# Patient Record
Sex: MALE | Race: WHITE | NOT HISPANIC OR LATINO | Employment: FULL TIME | URBAN - NONMETROPOLITAN AREA
[De-identification: names, ages, dates, MRNs, and addresses within clinical notes are randomized per-mention and may not be internally consistent; named-entity substitution may affect disease eponyms.]

---

## 2023-03-14 DIAGNOSIS — D50.8 OTHER IRON DEFICIENCY ANEMIA: ICD-10-CM

## 2023-03-14 RX ORDER — FENOFIBRIC ACID 135 MG/1
1 CAPSULE, DELAYED RELEASE ORAL DAILY
COMMUNITY
Start: 2023-03-10 | End: 2023-05-19 | Stop reason: SDUPTHER

## 2023-03-14 RX ORDER — FLUTICASONE PROPIONATE 50 MCG
2 SPRAY, SUSPENSION (ML) NASAL DAILY
COMMUNITY
Start: 2020-05-04

## 2023-03-14 RX ORDER — FOLIC ACID 1 MG/1
1 TABLET ORAL DAILY
COMMUNITY
End: 2024-02-07 | Stop reason: WASHOUT

## 2023-03-14 RX ORDER — METHOTREXATE SODIUM 1 G/1
1 INJECTION, POWDER, LYOPHILIZED, FOR SOLUTION INTRA-ARTERIAL; INTRAMUSCULAR; INTRATHECAL; INTRAVENOUS
COMMUNITY
End: 2023-11-20 | Stop reason: ALTCHOICE

## 2023-03-14 RX ORDER — FERROUS SULFATE TAB 325 MG (65 MG ELEMENTAL FE) 325 (65 FE) MG
1 TAB ORAL DAILY
COMMUNITY
End: 2023-03-14 | Stop reason: SDUPTHER

## 2023-03-14 RX ORDER — METOPROLOL TARTRATE 25 MG/1
25 TABLET, FILM COATED ORAL DAILY
COMMUNITY
End: 2023-04-24 | Stop reason: ALTCHOICE

## 2023-03-14 RX ORDER — ROSUVASTATIN CALCIUM 20 MG/1
1 TABLET, COATED ORAL DAILY
COMMUNITY
Start: 2022-08-09 | End: 2023-10-06 | Stop reason: SDUPTHER

## 2023-03-14 RX ORDER — ESCITALOPRAM OXALATE 20 MG/1
20 TABLET ORAL DAILY
COMMUNITY
End: 2023-05-19 | Stop reason: SDUPTHER

## 2023-03-14 RX ORDER — CYANOCOBALAMIN 1000 UG/ML
1000 INJECTION, SOLUTION INTRAMUSCULAR; SUBCUTANEOUS
COMMUNITY
End: 2023-05-19 | Stop reason: SDUPTHER

## 2023-03-14 RX ORDER — LEFLUNOMIDE 20 MG/1
20 TABLET ORAL DAILY
COMMUNITY
End: 2023-11-20 | Stop reason: SDUPTHER

## 2023-03-14 RX ORDER — FERROUS SULFATE TAB 325 MG (65 MG ELEMENTAL FE) 325 (65 FE) MG
1 TAB ORAL DAILY
Qty: 90 TABLET | Refills: 0 | Status: SHIPPED | OUTPATIENT
Start: 2023-03-14 | End: 2023-06-14

## 2023-03-14 RX ORDER — ACETAMINOPHEN 500 MG
50 TABLET ORAL DAILY
COMMUNITY
Start: 2021-03-15

## 2023-03-14 RX ORDER — HYDROXYZINE HYDROCHLORIDE 10 MG/1
1 TABLET, FILM COATED ORAL
COMMUNITY
Start: 2022-12-07 | End: 2024-01-10 | Stop reason: WASHOUT

## 2023-03-14 RX ORDER — LISINOPRIL 10 MG/1
10 TABLET ORAL DAILY
COMMUNITY
Start: 2023-03-13 | End: 2023-05-18 | Stop reason: SDUPTHER

## 2023-04-24 ENCOUNTER — TELEMEDICINE (OUTPATIENT)
Dept: PRIMARY CARE | Facility: CLINIC | Age: 53
End: 2023-04-24
Payer: COMMERCIAL

## 2023-04-24 DIAGNOSIS — F17.210 CIGARETTE NICOTINE DEPENDENCE WITHOUT COMPLICATION: ICD-10-CM

## 2023-04-24 DIAGNOSIS — I10 PRIMARY HYPERTENSION: Primary | ICD-10-CM

## 2023-04-24 PROBLEM — J30.9 ALLERGIC RHINITIS: Status: ACTIVE | Noted: 2023-04-24

## 2023-04-24 PROBLEM — F32.A DEPRESSION: Status: ACTIVE | Noted: 2023-04-24

## 2023-04-24 PROBLEM — M25.561 BILATERAL KNEE PAIN: Status: ACTIVE | Noted: 2023-04-24

## 2023-04-24 PROBLEM — E29.1 HYPOGONADISM MALE: Status: ACTIVE | Noted: 2023-04-24

## 2023-04-24 PROBLEM — M54.10 RADICULOPATHY: Status: ACTIVE | Noted: 2023-04-24

## 2023-04-24 PROBLEM — R73.01 ELEVATED FASTING BLOOD SUGAR: Status: ACTIVE | Noted: 2023-04-24

## 2023-04-24 PROBLEM — R68.82 DECREASED LIBIDO: Status: ACTIVE | Noted: 2023-04-24

## 2023-04-24 PROBLEM — M54.12 CERVICAL RADICULOPATHY, ACUTE: Status: ACTIVE | Noted: 2023-04-24

## 2023-04-24 PROBLEM — H66.90 OTITIS MEDIA: Status: ACTIVE | Noted: 2023-04-24

## 2023-04-24 PROBLEM — H90.6 MIXED HEARING LOSS, BILATERAL: Status: ACTIVE | Noted: 2023-04-24

## 2023-04-24 PROBLEM — H60.92 LEFT OTITIS EXTERNA: Status: ACTIVE | Noted: 2023-04-24

## 2023-04-24 PROBLEM — R30.0 DYSURIA: Status: ACTIVE | Noted: 2023-04-24

## 2023-04-24 PROBLEM — D51.0 PERNICIOUS ANEMIA: Status: ACTIVE | Noted: 2023-04-24

## 2023-04-24 PROBLEM — D64.9 ANEMIA: Status: ACTIVE | Noted: 2023-04-24

## 2023-04-24 PROBLEM — L02.31 ABSCESS OF RIGHT BUTTOCK: Status: ACTIVE | Noted: 2023-04-24

## 2023-04-24 PROBLEM — H73.892 RETRACTION OF TYMPANIC MEMBRANE OF LEFT EAR: Status: ACTIVE | Noted: 2023-04-24

## 2023-04-24 PROBLEM — K64.9 HEMORRHOID: Status: ACTIVE | Noted: 2023-04-24

## 2023-04-24 PROBLEM — R05.9 COUGH: Status: ACTIVE | Noted: 2023-04-24

## 2023-04-24 PROBLEM — L98.9 CHANGING SKIN LESION: Status: ACTIVE | Noted: 2023-04-24

## 2023-04-24 PROBLEM — D72.829 ELEVATED WBC COUNT: Status: ACTIVE | Noted: 2023-04-24

## 2023-04-24 PROBLEM — G96.198 ARACHNOID CYST OF SPINE: Status: ACTIVE | Noted: 2023-04-24

## 2023-04-24 PROBLEM — M06.9 RHEUMATOID ARTHRITIS (MULTI): Status: ACTIVE | Noted: 2023-04-24

## 2023-04-24 PROBLEM — R93.89 ABNORMAL MRI: Status: ACTIVE | Noted: 2023-04-24

## 2023-04-24 PROBLEM — R21 RASH: Status: ACTIVE | Noted: 2023-04-24

## 2023-04-24 PROBLEM — J01.00 ACUTE MAXILLARY SINUSITIS: Status: ACTIVE | Noted: 2023-04-24

## 2023-04-24 PROBLEM — M54.2 NECK PAIN, ACUTE: Status: ACTIVE | Noted: 2023-04-24

## 2023-04-24 PROBLEM — M25.561 RIGHT KNEE PAIN: Status: ACTIVE | Noted: 2023-04-24

## 2023-04-24 PROBLEM — M25.562 BILATERAL KNEE PAIN: Status: ACTIVE | Noted: 2023-04-24

## 2023-04-24 PROBLEM — M19.90 ARTHRITIS: Status: ACTIVE | Noted: 2023-04-24

## 2023-04-24 PROBLEM — R53.83 FATIGUE: Status: ACTIVE | Noted: 2023-04-24

## 2023-04-24 PROBLEM — J20.9 ACUTE BRONCHITIS: Status: ACTIVE | Noted: 2023-04-24

## 2023-04-24 PROBLEM — R94.128 FLAT TYMPANOGRAM OF BOTH EARS: Status: ACTIVE | Noted: 2023-04-24

## 2023-04-24 PROBLEM — E66.9 OBESITY: Status: ACTIVE | Noted: 2023-04-24

## 2023-04-24 PROBLEM — E78.5 DYSLIPIDEMIA: Status: ACTIVE | Noted: 2023-04-24

## 2023-04-24 PROBLEM — J34.89 SINUS DRAINAGE: Status: ACTIVE | Noted: 2023-04-24

## 2023-04-24 PROCEDURE — 99213 OFFICE O/P EST LOW 20 MIN: CPT | Performed by: FAMILY MEDICINE

## 2023-04-24 RX ORDER — METOPROLOL TARTRATE 50 MG/1
50 TABLET ORAL 2 TIMES DAILY
Qty: 60 TABLET | Refills: 1 | Status: SHIPPED | OUTPATIENT
Start: 2023-04-24 | End: 2023-06-14 | Stop reason: SDUPTHER

## 2023-04-29 LAB — SARS-COV-2 RESULT: NOT DETECTED

## 2023-04-30 NOTE — PROGRESS NOTES
Subjective     Domo Arcos is a 52 y.o. male who presents for Follow-up and Hypertension (Follow up meds/HTN).       This was completed via telephone due to the restrictions of the COVID-19 pandemic.  All issues as below were discussed and addressed but no physical exam was performed.  If it was felt that the patient should be evaluated in clinic then they were director there.  The patient/parent verbally consented to the visit.    Had attended video call appointment with patient's wife on [email or web address]      HPI  He had been to the neurosurgeon and blood pressure was a concern.  Today at home blood pressure was 170/100.  Denies headache denies blurred vision.  Denies chest pain.  Denies heart palpitations.  Denies shortness of breath.  Denies tingling numbness or weakness.  Educated on tobacco cessation      Review of Systems  Dictated as above  Objective   Virtual visit    Physical Exam  Virtual visit  Assessment/Plan   1.  Hypertension.  Educated on low-salt and exercise.  Started on medication.  Explained adverse effects of medication.  Advised to call 911 or to go to the emergency room as soon as possible if the blood pressure is still elevated and or tingling numbness and or weakness and or dizziness and her headache.  He understood verbalized understood and agreed.    2.  Nicotine dependence/addiction.  Educated on tobacco cessation.  Does not wish to take nicotine patch or gum or inhaler    Does not wish to take the Chantix      Problem List Items Addressed This Visit          Circulatory    Hypertension - Primary    Relevant Medications    metoprolol tartrate (Lopressor) 50 mg tablet       Other    Cigarette nicotine dependence without complication

## 2023-05-05 ENCOUNTER — PATIENT OUTREACH (OUTPATIENT)
Dept: CARE COORDINATION | Facility: CLINIC | Age: 53
End: 2023-05-05
Payer: COMMERCIAL

## 2023-05-18 DIAGNOSIS — I10 PRIMARY HYPERTENSION: ICD-10-CM

## 2023-05-18 RX ORDER — LISINOPRIL 10 MG/1
20 TABLET ORAL 2 TIMES DAILY
Qty: 360 TABLET | Refills: 0 | Status: SHIPPED | OUTPATIENT
Start: 2023-05-18 | End: 2023-10-03 | Stop reason: SDUPTHER

## 2023-05-19 DIAGNOSIS — E78.5 DYSLIPIDEMIA: Primary | ICD-10-CM

## 2023-05-19 DIAGNOSIS — D51.0 PERNICIOUS ANEMIA: ICD-10-CM

## 2023-05-19 DIAGNOSIS — F41.9 ANXIETY: ICD-10-CM

## 2023-05-19 RX ORDER — FENOFIBRIC ACID 135 MG/1
135 CAPSULE, DELAYED RELEASE ORAL DAILY
Qty: 90 CAPSULE | Refills: 0 | Status: SHIPPED | OUTPATIENT
Start: 2023-05-19 | End: 2023-12-07 | Stop reason: WASHOUT

## 2023-05-19 RX ORDER — ESCITALOPRAM OXALATE 20 MG/1
20 TABLET ORAL DAILY
Qty: 90 TABLET | Refills: 0 | Status: SHIPPED | OUTPATIENT
Start: 2023-05-19 | End: 2023-10-03 | Stop reason: SDUPTHER

## 2023-05-19 RX ORDER — CYANOCOBALAMIN 1000 UG/ML
1000 INJECTION, SOLUTION INTRAMUSCULAR; SUBCUTANEOUS
Qty: 3 ML | Refills: 0 | Status: SHIPPED | OUTPATIENT
Start: 2023-05-19 | End: 2023-10-03 | Stop reason: SDUPTHER

## 2023-06-02 LAB
GRAM STAIN: NORMAL
TISSUE/WOUND CULTURE/SMEAR: NORMAL

## 2023-06-04 LAB
GRAM STAIN: ABNORMAL
TISSUE/WOUND CULTURE/SMEAR: ABNORMAL

## 2023-06-13 DIAGNOSIS — D50.8 OTHER IRON DEFICIENCY ANEMIA: ICD-10-CM

## 2023-06-13 DIAGNOSIS — I10 PRIMARY HYPERTENSION: ICD-10-CM

## 2023-06-14 RX ORDER — FERROUS SULFATE TAB 325 MG (65 MG ELEMENTAL FE) 325 (65 FE) MG
TAB ORAL
Qty: 90 TABLET | Refills: 0 | Status: SHIPPED | OUTPATIENT
Start: 2023-06-14 | End: 2023-10-03 | Stop reason: SDUPTHER

## 2023-06-14 RX ORDER — METOPROLOL TARTRATE 50 MG/1
50 TABLET ORAL 2 TIMES DAILY
Qty: 180 TABLET | Refills: 0 | Status: SHIPPED | OUTPATIENT
Start: 2023-06-14 | End: 2023-10-03 | Stop reason: SDUPTHER

## 2023-10-02 ENCOUNTER — OFFICE VISIT (OUTPATIENT)
Dept: WOUND CARE | Facility: HOSPITAL | Age: 53
End: 2023-10-02
Payer: COMMERCIAL

## 2023-10-02 PROCEDURE — 11042 DBRDMT SUBQ TIS 1ST 20SQCM/<: CPT

## 2023-10-03 ENCOUNTER — OFFICE VISIT (OUTPATIENT)
Dept: PRIMARY CARE | Facility: CLINIC | Age: 53
End: 2023-10-03
Payer: COMMERCIAL

## 2023-10-03 ENCOUNTER — PHARMACY VISIT (OUTPATIENT)
Dept: PHARMACY | Facility: CLINIC | Age: 53
End: 2023-10-03
Payer: COMMERCIAL

## 2023-10-03 VITALS
OXYGEN SATURATION: 94 % | HEIGHT: 67 IN | HEART RATE: 74 BPM | TEMPERATURE: 97 F | SYSTOLIC BLOOD PRESSURE: 130 MMHG | BODY MASS INDEX: 37.83 KG/M2 | DIASTOLIC BLOOD PRESSURE: 88 MMHG | WEIGHT: 241 LBS

## 2023-10-03 DIAGNOSIS — D51.0 PERNICIOUS ANEMIA: ICD-10-CM

## 2023-10-03 DIAGNOSIS — F41.9 ANXIETY: ICD-10-CM

## 2023-10-03 DIAGNOSIS — Z00.00 HEALTHCARE MAINTENANCE: Primary | ICD-10-CM

## 2023-10-03 DIAGNOSIS — Z12.11 ENCOUNTER FOR SCREENING COLONOSCOPY: ICD-10-CM

## 2023-10-03 DIAGNOSIS — D50.8 OTHER IRON DEFICIENCY ANEMIA: ICD-10-CM

## 2023-10-03 DIAGNOSIS — R53.83 OTHER FATIGUE: ICD-10-CM

## 2023-10-03 DIAGNOSIS — I10 PRIMARY HYPERTENSION: ICD-10-CM

## 2023-10-03 PROCEDURE — RXMED WILLOW AMBULATORY MEDICATION CHARGE

## 2023-10-03 PROCEDURE — 99396 PREV VISIT EST AGE 40-64: CPT

## 2023-10-03 PROCEDURE — 3008F BODY MASS INDEX DOCD: CPT

## 2023-10-03 PROCEDURE — 3075F SYST BP GE 130 - 139MM HG: CPT

## 2023-10-03 PROCEDURE — 3079F DIAST BP 80-89 MM HG: CPT

## 2023-10-03 RX ORDER — METOPROLOL TARTRATE 50 MG/1
50 TABLET ORAL 2 TIMES DAILY
Qty: 180 TABLET | Refills: 0 | Status: SHIPPED | OUTPATIENT
Start: 2023-10-03 | End: 2024-01-08 | Stop reason: SDUPTHER

## 2023-10-03 RX ORDER — CYANOCOBALAMIN 1000 UG/ML
1000 INJECTION, SOLUTION INTRAMUSCULAR; SUBCUTANEOUS
Qty: 3 ML | Refills: 0 | Status: SHIPPED | OUTPATIENT
Start: 2023-10-03

## 2023-10-03 RX ORDER — FERROUS SULFATE 325(65) MG
1 TABLET ORAL DAILY
Qty: 90 TABLET | Refills: 0 | Status: SHIPPED | OUTPATIENT
Start: 2023-10-03

## 2023-10-03 RX ORDER — ESCITALOPRAM OXALATE 20 MG/1
20 TABLET ORAL DAILY
Qty: 90 TABLET | Refills: 0 | Status: SHIPPED | OUTPATIENT
Start: 2023-10-03 | End: 2024-01-03 | Stop reason: SINTOL

## 2023-10-03 RX ORDER — LISINOPRIL 10 MG/1
10 TABLET ORAL 2 TIMES DAILY
Qty: 360 TABLET | Refills: 0 | Status: SHIPPED | OUTPATIENT
Start: 2023-10-03 | End: 2024-03-28 | Stop reason: SDUPTHER

## 2023-10-03 NOTE — PATIENT INSTRUCTIONS
Come in tomorrow for blood draw, we will call with results    Thank you for coming in today, if any questions or concerns arise, please call my office.   KRISTINE Collins-CNP

## 2023-10-03 NOTE — PROGRESS NOTES
Subjective   Patient ID: Domo Arcos is a 52 y.o. male who presents for Establish Care (Domo is here to get established is needing refills on medications. ).  Domo presents to establish care today  Overall no concerns but does have underlying fatigue     Subjective  Domo Arcos is a 52 y.o. male who presents for evaluation of fatigue. Symptoms began several years ago. The patient feels the fatigue began with: exercise intolerance and a significant change in weight. Symptoms of his fatigue have been decreased libido, general malaise, and lack of interest in usual activities. Patient describes the following psychological symptoms: stress at work. Patient denies cold intolerance, GI blood loss, and significant change in weight. Symptoms have progressed to a point and plateaued. Symptom severity: symptoms bothersome, but easily able to carry out all usual work/school/family activities. Previous visits for this problem: yes, last seen a few months ago by the patient's former PCP.     Objective  [unfilled]    Assessment/Plan  Fatigue, organic etiology unlikely based on careful history and exam.  Fatigue, organic cause likely.    Discussed diagnosis with patient.  Reassured that serious underlying cause for the fatigue is very unlikely.  Discussed lifestyle modification as means of resolving problem.  See orders for lab evaluation.      Tired all the time, poor diet, high carbohydrate intake, eats out often.   Not exercising often.         Vitals:    10/03/23 1436   BP: 130/88   Pulse: 74   Temp: 36.1 °C (97 °F)   SpO2: 94%       Review of Systems   Constitutional:  Negative for activity change, fatigue, fever and unexpected weight change.   HENT: Negative.     Respiratory: Negative.  Negative for shortness of breath.    Cardiovascular: Negative.  Negative for chest pain.   Gastrointestinal: Negative.  Negative for abdominal pain.   Endocrine: Negative.    Musculoskeletal: Negative.    Skin: Negative.     Allergic/Immunologic: Negative.    Neurological:  Negative for dizziness, weakness and headaches.   Psychiatric/Behavioral: Negative.         Objective   Physical Exam  Vitals and nursing note reviewed.   Constitutional:       Appearance: Normal appearance.   HENT:      Head: Normocephalic.      Mouth/Throat:      Mouth: Mucous membranes are moist.   Cardiovascular:      Rate and Rhythm: Normal rate and regular rhythm.      Pulses: Normal pulses.      Heart sounds: Normal heart sounds. No murmur heard.     No friction rub. No gallop.   Pulmonary:      Effort: Pulmonary effort is normal. No respiratory distress.      Breath sounds: Normal breath sounds. No wheezing.   Abdominal:      General: Bowel sounds are normal. There is no distension.      Palpations: Abdomen is soft.      Tenderness: There is no abdominal tenderness.   Musculoskeletal:         General: No deformity. Normal range of motion.   Skin:     General: Skin is warm and dry.      Capillary Refill: Capillary refill takes less than 2 seconds.   Neurological:      General: No focal deficit present.      Mental Status: He is alert and oriented to person, place, and time.   Psychiatric:         Attention and Perception: Attention and perception normal.         Mood and Affect: Mood normal. Affect is flat.         Assessment/Plan   Problem List Items Addressed This Visit       Anemia    Relevant Medications    FeroSuL 325 mg (65 mg iron) tablet    Fatigue    Relevant Orders    Iron and TIBC    Testosterone, total and free    Hypertension    Relevant Medications    metoprolol tartrate (Lopressor) 50 mg tablet    lisinopril 10 mg tablet    Pernicious anemia    Relevant Medications    cyanocobalamin (Vitamin B-12) 1,000 mcg/mL injection     Other Visit Diagnoses       Encounter for screening colonoscopy    -  Primary    Relevant Orders    Referral to General Surgery    Healthcare maintenance        Relevant Orders    Hepatitis C Antibody    HIV 1/2  Antigen/Antibody Screen with Reflex to Confirmation    Vitamin D 25-Hydroxy,Total (for eval of Vitamin D levels)    CBC and Auto Differential    Comprehensive Metabolic Panel    Lipid Panel    TSH with reflex to Free T4 if abnormal    PSA, total and free    Anxiety        Relevant Medications    escitalopram (Lexapro) 20 mg tablet                 Thank you for coming in today, please call my office if you have any concerns or questions.     Socrates CARMONA, CNP

## 2023-10-04 ENCOUNTER — CLINICAL SUPPORT (OUTPATIENT)
Dept: PRIMARY CARE | Facility: CLINIC | Age: 53
End: 2023-10-04
Payer: COMMERCIAL

## 2023-10-04 DIAGNOSIS — R53.83 OTHER FATIGUE: ICD-10-CM

## 2023-10-04 DIAGNOSIS — Z00.00 HEALTHCARE MAINTENANCE: ICD-10-CM

## 2023-10-04 PROCEDURE — 87389 HIV-1 AG W/HIV-1&-2 AB AG IA: CPT

## 2023-10-04 PROCEDURE — 84402 ASSAY OF FREE TESTOSTERONE: CPT

## 2023-10-04 PROCEDURE — 86803 HEPATITIS C AB TEST: CPT

## 2023-10-04 PROCEDURE — 36415 COLL VENOUS BLD VENIPUNCTURE: CPT

## 2023-10-04 PROCEDURE — 83550 IRON BINDING TEST: CPT

## 2023-10-04 PROCEDURE — 82306 VITAMIN D 25 HYDROXY: CPT

## 2023-10-04 PROCEDURE — 83540 ASSAY OF IRON: CPT

## 2023-10-04 PROCEDURE — 80050 GENERAL HEALTH PANEL: CPT

## 2023-10-04 PROCEDURE — 80061 LIPID PANEL: CPT

## 2023-10-04 ASSESSMENT — ENCOUNTER SYMPTOMS
ACTIVITY CHANGE: 0
DIZZINESS: 0
FEVER: 0
CARDIOVASCULAR NEGATIVE: 1
WEAKNESS: 0
PSYCHIATRIC NEGATIVE: 1
HEADACHES: 0
ENDOCRINE NEGATIVE: 1
FATIGUE: 0
RESPIRATORY NEGATIVE: 1
ABDOMINAL PAIN: 0
MUSCULOSKELETAL NEGATIVE: 1
UNEXPECTED WEIGHT CHANGE: 0
GASTROINTESTINAL NEGATIVE: 1
SHORTNESS OF BREATH: 0
ALLERGIC/IMMUNOLOGIC NEGATIVE: 1

## 2023-10-05 LAB
25(OH)D3 SERPL-MCNC: 42 NG/ML (ref 30–100)
ALBUMIN SERPL BCP-MCNC: 4.3 G/DL (ref 3.4–5)
ALP SERPL-CCNC: 39 U/L (ref 33–120)
ALT SERPL W P-5'-P-CCNC: 25 U/L (ref 10–52)
ANION GAP SERPL CALC-SCNC: 17 MMOL/L (ref 10–20)
AST SERPL W P-5'-P-CCNC: 33 U/L (ref 9–39)
BASOPHILS # BLD AUTO: 0.07 X10*3/UL (ref 0–0.1)
BASOPHILS NFR BLD AUTO: 0.7 %
BILIRUB SERPL-MCNC: 0.7 MG/DL (ref 0–1.2)
BUN SERPL-MCNC: 22 MG/DL (ref 6–23)
CALCIUM SERPL-MCNC: 9.6 MG/DL (ref 8.6–10.6)
CHLORIDE SERPL-SCNC: 103 MMOL/L (ref 98–107)
CHOLEST SERPL-MCNC: 217 MG/DL (ref 0–199)
CHOLESTEROL/HDL RATIO: 4.7
CO2 SERPL-SCNC: 27 MMOL/L (ref 21–32)
CREAT SERPL-MCNC: 1.13 MG/DL (ref 0.5–1.3)
EOSINOPHIL # BLD AUTO: 0.19 X10*3/UL (ref 0–0.7)
EOSINOPHIL NFR BLD AUTO: 1.8 %
ERYTHROCYTE [DISTWIDTH] IN BLOOD BY AUTOMATED COUNT: 15.9 % (ref 11.5–14.5)
GFR SERPL CREATININE-BSD FRML MDRD: 79 ML/MIN/1.73M*2
GLUCOSE SERPL-MCNC: 74 MG/DL (ref 74–99)
HCT VFR BLD AUTO: 41.4 % (ref 41–52)
HCV AB SER QL: NONREACTIVE
HDLC SERPL-MCNC: 45.9 MG/DL
HGB BLD-MCNC: 12.9 G/DL (ref 13.5–17.5)
HIV 1+2 AB+HIV1 P24 AG SERPL QL IA: NONREACTIVE
IMM GRANULOCYTES # BLD AUTO: 0.04 X10*3/UL (ref 0–0.7)
IMM GRANULOCYTES NFR BLD AUTO: 0.4 % (ref 0–0.9)
IRON SATN MFR SERPL: 20 % (ref 25–45)
IRON SERPL-MCNC: 81 UG/DL (ref 35–150)
LDLC SERPL CALC-MCNC: 125 MG/DL (ref 140–190)
LYMPHOCYTES # BLD AUTO: 1.35 X10*3/UL (ref 1.2–4.8)
LYMPHOCYTES NFR BLD AUTO: 12.8 %
MCH RBC QN AUTO: 28.9 PG (ref 26–34)
MCHC RBC AUTO-ENTMCNC: 31.2 G/DL (ref 32–36)
MCV RBC AUTO: 93 FL (ref 80–100)
MONOCYTES # BLD AUTO: 1.29 X10*3/UL (ref 0.1–1)
MONOCYTES NFR BLD AUTO: 12.2 %
NEUTROPHILS # BLD AUTO: 7.62 X10*3/UL (ref 1.2–7.7)
NEUTROPHILS NFR BLD AUTO: 72.1 %
NON HDL CHOLESTEROL: 171 MG/DL (ref 0–149)
NRBC BLD-RTO: 0 /100 WBCS (ref 0–0)
PLATELET # BLD AUTO: 284 X10*3/UL (ref 150–450)
PMV BLD AUTO: 12.8 FL (ref 7.5–11.5)
POTASSIUM SERPL-SCNC: 5.2 MMOL/L (ref 3.5–5.3)
PROT SERPL-MCNC: 7.3 G/DL (ref 6.4–8.2)
RBC # BLD AUTO: 4.47 X10*6/UL (ref 4.5–5.9)
SODIUM SERPL-SCNC: 142 MMOL/L (ref 136–145)
TIBC SERPL-MCNC: 407 UG/DL (ref 240–445)
TRIGL SERPL-MCNC: 232 MG/DL (ref 0–149)
TSH SERPL-ACNC: 0.98 MIU/L (ref 0.44–3.98)
UIBC SERPL-MCNC: 326 UG/DL (ref 110–370)
VLDL: 46 MG/DL (ref 0–40)
WBC # BLD AUTO: 10.6 X10*3/UL (ref 4.4–11.3)

## 2023-10-06 ENCOUNTER — TELEPHONE (OUTPATIENT)
Dept: PRIMARY CARE | Facility: CLINIC | Age: 53
End: 2023-10-06
Payer: COMMERCIAL

## 2023-10-06 DIAGNOSIS — E78.5 DYSLIPIDEMIA: ICD-10-CM

## 2023-10-08 LAB
TESTOSTERONE FREE (CHAN): 48.6 PG/ML (ref 35–155)
TESTOSTERONE,TOTAL,LC-MS/MS: 408 NG/DL (ref 250–1100)

## 2023-10-09 RX ORDER — ROSUVASTATIN CALCIUM 20 MG/1
20 TABLET, COATED ORAL DAILY
Qty: 90 TABLET | Refills: 0 | Status: SHIPPED | OUTPATIENT
Start: 2023-10-09 | End: 2023-10-18 | Stop reason: SINTOL

## 2023-10-10 ENCOUNTER — TELEPHONE (OUTPATIENT)
Dept: PRIMARY CARE | Facility: CLINIC | Age: 53
End: 2023-10-10
Payer: COMMERCIAL

## 2023-10-10 NOTE — TELEPHONE ENCOUNTER
His fatigue may be related to his low iron, or potentially autoimmune related to his RA. I think rheumatology would be a good avenue to discuss, I see he last saw them a few months ago, may need medication adjustment there.

## 2023-10-16 ENCOUNTER — CLINICAL SUPPORT (OUTPATIENT)
Dept: WOUND CARE | Facility: HOSPITAL | Age: 53
End: 2023-10-16
Payer: COMMERCIAL

## 2023-10-16 PROCEDURE — 97602 WOUND(S) CARE NON-SELECTIVE: CPT

## 2023-10-18 ENCOUNTER — TELEPHONE (OUTPATIENT)
Dept: PRIMARY CARE | Facility: CLINIC | Age: 53
End: 2023-10-18
Payer: COMMERCIAL

## 2023-10-18 DIAGNOSIS — E78.5 DYSLIPIDEMIA: Primary | ICD-10-CM

## 2023-10-18 RX ORDER — ATORVASTATIN CALCIUM 40 MG/1
40 TABLET, FILM COATED ORAL DAILY
Qty: 90 TABLET | Refills: 1 | Status: SHIPPED | OUTPATIENT
Start: 2023-10-18 | End: 2023-12-07 | Stop reason: SDUPTHER

## 2023-10-18 NOTE — TELEPHONE ENCOUNTER
Nubia from [facility] called stating there is a interaction for patients new prescription of rosuvastatin 20 mg and the leflunomide 10 mg- causes levels to increase for the rosuvastatin while on leflunomide. She states there is not an interaction for rosuvastatin 10 mg or the atorvastatin 10 mg.     Please review and send over accordingly.

## 2023-10-25 ENCOUNTER — PHARMACY VISIT (OUTPATIENT)
Dept: PHARMACY | Facility: CLINIC | Age: 53
End: 2023-10-25
Payer: COMMERCIAL

## 2023-10-25 PROCEDURE — RXMED WILLOW AMBULATORY MEDICATION CHARGE

## 2023-10-26 ENCOUNTER — OFFICE VISIT (OUTPATIENT)
Dept: PRIMARY CARE | Facility: CLINIC | Age: 53
End: 2023-10-26
Payer: COMMERCIAL

## 2023-10-26 VITALS
WEIGHT: 245 LBS | SYSTOLIC BLOOD PRESSURE: 130 MMHG | BODY MASS INDEX: 38.37 KG/M2 | HEART RATE: 79 BPM | OXYGEN SATURATION: 94 % | TEMPERATURE: 97.9 F | DIASTOLIC BLOOD PRESSURE: 80 MMHG

## 2023-10-26 DIAGNOSIS — R05.1 ACUTE COUGH: Primary | ICD-10-CM

## 2023-10-26 DIAGNOSIS — J32.9 SINUSITIS, UNSPECIFIED CHRONICITY, UNSPECIFIED LOCATION: ICD-10-CM

## 2023-10-26 PROCEDURE — 99214 OFFICE O/P EST MOD 30 MIN: CPT

## 2023-10-26 PROCEDURE — 3008F BODY MASS INDEX DOCD: CPT

## 2023-10-26 PROCEDURE — 3075F SYST BP GE 130 - 139MM HG: CPT

## 2023-10-26 PROCEDURE — 3079F DIAST BP 80-89 MM HG: CPT

## 2023-10-26 PROCEDURE — 87637 SARSCOV2&INF A&B&RSV AMP PRB: CPT

## 2023-10-26 RX ORDER — AZITHROMYCIN 250 MG/1
TABLET, FILM COATED ORAL
Qty: 6 TABLET | Refills: 0 | Status: SHIPPED | OUTPATIENT
Start: 2023-10-26 | End: 2023-10-31

## 2023-10-26 NOTE — PROGRESS NOTES
Subjective   Patient ID: Domo Arcos is a 52 y.o. male who presents for Sore Throat (Domo is here for a sore throat congestion, and a fever off and on. Started Saturday, was feeling better then got worse. Did have contact with someone who had a cough last week. ).  Subjective  Domo Arcos is a 52 y.o. male who presents for evaluation of sinus pain. Symptoms include: congestion, cough, fevers, sinus pressure, and sniffing. Onset of symptoms was 6 days ago. Symptoms have been gradually worsening since that time. Past history is significant for no history of pneumonia or bronchitis. Patient is a non-smoker.    Objective  [unfilled]     Assessment/Plan  Acute bacterial sinusitis.    Azithromycin per medication orders.          Vitals:    10/26/23 1041   BP: 130/80   Pulse: 79   Temp: 36.6 °C (97.9 °F)   SpO2: 94%       Review of Systems    Objective   Physical Exam  Constitutional:       General: He is not in acute distress.     Appearance: Normal appearance.   HENT:      Head: Normocephalic.      Right Ear: Tympanic membrane is erythematous.      Left Ear: Tympanic membrane is erythematous.      Ears:      Comments: Bilateral tympanostomy tubes present     Nose: Congestion and rhinorrhea present.      Mouth/Throat:      Pharynx: Oropharyngeal exudate and posterior oropharyngeal erythema present.   Eyes:      Extraocular Movements: Extraocular movements intact.      Pupils: Pupils are equal, round, and reactive to light.   Cardiovascular:      Rate and Rhythm: Normal rate and regular rhythm.   Pulmonary:      Effort: No respiratory distress.   Abdominal:      General: Abdomen is flat. There is no distension.   Skin:     General: Skin is warm and dry.      Capillary Refill: Capillary refill takes less than 2 seconds.   Neurological:      Mental Status: He is alert.   Psychiatric:         Mood and Affect: Mood normal.         Assessment/Plan   Problem List Items Addressed This Visit       Cough - Primary    Relevant  Orders    Influenza A, and B PCR    RSV PCR    Sars-CoV-2 PCR, Symptomatic     Other Visit Diagnoses       Sinusitis, unspecified chronicity, unspecified location        Relevant Medications    azithromycin (Zithromax) 250 mg tablet                 Thank you for coming in today, please call my office if you have any concerns or questions.     Socrates CARMONA, CNP

## 2023-10-27 LAB
FLUAV RNA RESP QL NAA+PROBE: NOT DETECTED
FLUBV RNA RESP QL NAA+PROBE: NOT DETECTED
RSV RNA RESP QL NAA+PROBE: NOT DETECTED
SARS-COV-2 RNA RESP QL NAA+PROBE: NOT DETECTED

## 2023-10-30 ENCOUNTER — APPOINTMENT (OUTPATIENT)
Dept: WOUND CARE | Facility: HOSPITAL | Age: 53
End: 2023-10-30
Payer: COMMERCIAL

## 2023-10-30 ENCOUNTER — TELEPHONE (OUTPATIENT)
Dept: PRIMARY CARE | Facility: CLINIC | Age: 53
End: 2023-10-30
Payer: COMMERCIAL

## 2023-10-30 NOTE — TELEPHONE ENCOUNTER
----- Message from Socrates Castañead, KRISTINE-CNP sent at 10/27/2023  8:11 AM EDT -----  Neg viral swab

## 2023-11-06 ENCOUNTER — CLINICAL SUPPORT (OUTPATIENT)
Dept: WOUND CARE | Facility: HOSPITAL | Age: 53
End: 2023-11-06
Payer: COMMERCIAL

## 2023-11-06 PROCEDURE — 99212 OFFICE O/P EST SF 10 MIN: CPT

## 2023-11-20 ENCOUNTER — OFFICE VISIT (OUTPATIENT)
Dept: RHEUMATOLOGY | Facility: CLINIC | Age: 53
End: 2023-11-20
Payer: COMMERCIAL

## 2023-11-20 ENCOUNTER — LAB (OUTPATIENT)
Dept: LAB | Facility: LAB | Age: 53
End: 2023-11-20
Payer: COMMERCIAL

## 2023-11-20 VITALS
BODY MASS INDEX: 38.14 KG/M2 | DIASTOLIC BLOOD PRESSURE: 97 MMHG | WEIGHT: 243 LBS | HEIGHT: 67 IN | SYSTOLIC BLOOD PRESSURE: 159 MMHG

## 2023-11-20 DIAGNOSIS — Z79.899 ENCOUNTER FOR LONG-TERM (CURRENT) USE OF MEDICATIONS: ICD-10-CM

## 2023-11-20 DIAGNOSIS — M05.9 SEROPOSITIVE RHEUMATOID ARTHRITIS (MULTI): Primary | ICD-10-CM

## 2023-11-20 DIAGNOSIS — M05.9 SEROPOSITIVE RHEUMATOID ARTHRITIS (MULTI): ICD-10-CM

## 2023-11-20 LAB
CRP SERPL-MCNC: 0.65 MG/DL
ERYTHROCYTE [SEDIMENTATION RATE] IN BLOOD BY WESTERGREN METHOD: 15 MM/H (ref 0–15)

## 2023-11-20 PROCEDURE — 3008F BODY MASS INDEX DOCD: CPT | Performed by: INTERNAL MEDICINE

## 2023-11-20 PROCEDURE — 36415 COLL VENOUS BLD VENIPUNCTURE: CPT

## 2023-11-20 PROCEDURE — 86481 TB AG RESPONSE T-CELL SUSP: CPT

## 2023-11-20 PROCEDURE — 85652 RBC SED RATE AUTOMATED: CPT

## 2023-11-20 PROCEDURE — 99214 OFFICE O/P EST MOD 30 MIN: CPT | Performed by: INTERNAL MEDICINE

## 2023-11-20 PROCEDURE — 3080F DIAST BP >= 90 MM HG: CPT | Performed by: INTERNAL MEDICINE

## 2023-11-20 PROCEDURE — 86140 C-REACTIVE PROTEIN: CPT

## 2023-11-20 PROCEDURE — 3077F SYST BP >= 140 MM HG: CPT | Performed by: INTERNAL MEDICINE

## 2023-11-20 RX ORDER — LEFLUNOMIDE 20 MG/1
20 TABLET ORAL DAILY
Qty: 90 TABLET | Refills: 3 | Status: SHIPPED | OUTPATIENT
Start: 2023-11-20 | End: 2024-11-19

## 2023-11-20 ASSESSMENT — ENCOUNTER SYMPTOMS
FATIGUE: 1
SLEEP DISTURBANCE: 0
ANAL BLEEDING: 0

## 2023-11-20 NOTE — PROGRESS NOTES
Subjective   Patient ID: Domo Arcos is a 52 y.o. male who presents for Rheumatoid Arthritis (8 month follow up/Patient states that his feet are bothering him the most).  HPI  Patient with history of seropositive rheumatoid arthritis.  Previously had been on Humira and methotrexate.  Had been found to have a cyst on his spine followed by neurosurgeon Dr. Rico.  Previously had been on allopurinol for elevated uric acid.    His last visit on 2/13/2023 was a telehealth visit and I was unable to physically evaluate him.  He had felt ill on that day.  I have not physically seen him since April 2022.  He has continued to be on Xeljanz.  Since I last saw him he had a thoracic arachnoid cyst of spine removed and had T2-T5 laminectomies for resection/fenestration of intradural lesion.  The wound didn't want to heal until recently  Recently he did Have a cough and sore throat and had discussed it with his provider in October 26.  He did have recent blood work in October that showed mild anemia and normal liver and kidney functions on blood work.  He has foot issues.  Had  seen podiatry and they want to do surgery.  Has callous and bunions.    Knees sometimes  Doesn't have too much stiffness  Works at an office    Review of Systems   Constitutional:  Positive for fatigue.   HENT:          No dental issues   Eyes:         Wears glasses, vision getting worse.     Cardiovascular:  Negative for chest pain.   Gastrointestinal:  Negative for anal bleeding.   Musculoskeletal:         Per HPI   Skin:         Cysts on back   Psychiatric/Behavioral:  Negative for sleep disturbance.        Objective   Physical Exam  GEN: NAD A&O x3 appropriate affect  EYES: no conjunctival redness, eyelids normal  LYMPH: no cervical lymphadenopathy  NEURO: 5/5 strength upper and lower extremities, DTR +2 bicep and patellar tendons  SKIN:no rashes  PULSES: +radials  TENDER POINTS: 0/18   MSK:  Chronic changes in the DIP PIP MCP and wrists but no swelling  or tenderness of these joints.  Contracture of the right elbow mild decreased range of motion bilateral shoulders.  Fullness in the bilateral knees with more on the right than the left and hypertrophic changes medially.  Has chronic changes in his feet and large bunions.  Assessment/Plan        Seropositive rheumatoid arthritis. -Previously had been on Humira and methotrexate   -Currently on Xeljanz and leflunomide and has had overall improvement of his muscle skeletal symptoms.  He did have long healing time after a cyst was removed from his thoracic spine in May.  He had a discussion with podiatry about his feet which bother him the most and I feel that he may get complications with healing.  Would need to discontinue Xeljanz and leflunomide for a while which may increase his flares.    Reviewed his recent blood work from October.  Does have some mild anemia.  Has normal liver and kidney functions.  We will check his inflammatory markers and TB test.  We will have him come back in 6 months or as needed.

## 2023-11-21 RX ORDER — TOFACITINIB 11 MG/1
11 TABLET, FILM COATED, EXTENDED RELEASE ORAL DAILY
Qty: 30 TABLET | Refills: 5 | Status: SHIPPED | OUTPATIENT
Start: 2023-11-21 | End: 2024-06-18

## 2023-11-22 LAB
NIL(NEG) CONTROL SPOT COUNT: NORMAL
PANEL A SPOT COUNT: 1
PANEL B SPOT COUNT: 0
POS CONTROL SPOT COUNT: NORMAL
T-SPOT. TB INTERPRETATION: NEGATIVE

## 2023-11-27 ENCOUNTER — PHARMACY VISIT (OUTPATIENT)
Dept: PHARMACY | Facility: CLINIC | Age: 53
End: 2023-11-27
Payer: COMMERCIAL

## 2023-11-27 PROCEDURE — RXMED WILLOW AMBULATORY MEDICATION CHARGE

## 2023-12-01 ENCOUNTER — SPECIALTY PHARMACY (OUTPATIENT)
Dept: PHARMACY | Facility: CLINIC | Age: 53
End: 2023-12-01

## 2023-12-05 ENCOUNTER — TELEMEDICINE CLINICAL SUPPORT (OUTPATIENT)
Dept: PHARMACY | Facility: HOSPITAL | Age: 53
End: 2023-12-05
Payer: COMMERCIAL

## 2023-12-05 NOTE — PROGRESS NOTES
[facility] Clinical Note    Domo Arcos is a 53 y.o. male, who is on the specialty pharmacy service for management of: Rheumatology Core with status of: (Enrolled)     Domo was contacted on 12/5/2023 at 12:12 PM for a virtual pharmacy visit with encounter number [id number] from the [id number]  [address]  [facility] [address]  Dept: [phone]  Dept Fax: [phone]      Domo is taking: Xeljanz. This patient's care will be continued with the referring prescriber Dr. Marissa Adams.      General Assessment       Impression/Plan  IMPRESSION/PLAN:  Is patient high risk (potential patients:  pregnancy, geriatric, pediatric)?  No  Is laboratory follow-up needed? No   Is a clinical intervention needed? No  Next reassessment date? 11/2024  Additional comments:     Refer to the encounter summary report for documentation details about patient counseling and education.      Medication Adherence  The importance of adherence was discussed with the patient and they were advised to take the medication as prescribed by their provider. Domo was encouraged to call his physician's office if they have a question regarding a missed dose.     Conclusion  Rate your quality of life on scale of 1-10: 7  Rate your satisfaction with  Specialty Pharmacy on scale of 1-10: 9      Patient advised to contact the pharmacy if there are any changes to her medication list, including prescriptions, OTC medications, herbal products, or supplements. Patient was advised of [facility]’s dispensing process, refill timeline, contact information ([phone]), and patient management follow up. Patient confirmed understanding of education conducted during assessment. All patient questions and concerns were addressed to the best of my ability. Patient was encouraged to contact the specialty pharmacy with any  questions or concerns.    Confirmed follow-up outreaches are properly scheduled. Reviewed goals of therapy in the program targets.    Jessie Cosyb, HusseinD

## 2023-12-07 ENCOUNTER — OFFICE VISIT (OUTPATIENT)
Dept: PRIMARY CARE | Facility: CLINIC | Age: 53
End: 2023-12-07
Payer: COMMERCIAL

## 2023-12-07 ENCOUNTER — ANCILLARY PROCEDURE (OUTPATIENT)
Dept: RADIOLOGY | Facility: CLINIC | Age: 53
End: 2023-12-07
Payer: COMMERCIAL

## 2023-12-07 VITALS
HEART RATE: 84 BPM | WEIGHT: 254 LBS | SYSTOLIC BLOOD PRESSURE: 140 MMHG | DIASTOLIC BLOOD PRESSURE: 80 MMHG | BODY MASS INDEX: 39.78 KG/M2 | OXYGEN SATURATION: 94 % | TEMPERATURE: 96.6 F

## 2023-12-07 DIAGNOSIS — E78.5 DYSLIPIDEMIA: ICD-10-CM

## 2023-12-07 DIAGNOSIS — J06.9 UPPER RESPIRATORY TRACT INFECTION, UNSPECIFIED TYPE: ICD-10-CM

## 2023-12-07 DIAGNOSIS — J06.9 UPPER RESPIRATORY TRACT INFECTION, UNSPECIFIED TYPE: Primary | ICD-10-CM

## 2023-12-07 PROCEDURE — 3079F DIAST BP 80-89 MM HG: CPT

## 2023-12-07 PROCEDURE — 99214 OFFICE O/P EST MOD 30 MIN: CPT

## 2023-12-07 PROCEDURE — 71046 X-RAY EXAM CHEST 2 VIEWS: CPT

## 2023-12-07 PROCEDURE — 71046 X-RAY EXAM CHEST 2 VIEWS: CPT | Performed by: RADIOLOGY

## 2023-12-07 PROCEDURE — 3008F BODY MASS INDEX DOCD: CPT

## 2023-12-07 PROCEDURE — 3077F SYST BP >= 140 MM HG: CPT

## 2023-12-07 RX ORDER — ATORVASTATIN CALCIUM 40 MG/1
40 TABLET, FILM COATED ORAL DAILY
Qty: 90 TABLET | Refills: 1 | Status: SHIPPED | OUTPATIENT
Start: 2023-12-07 | End: 2024-07-20

## 2023-12-07 RX ORDER — DOXYCYCLINE 100 MG/1
100 TABLET ORAL 2 TIMES DAILY
Qty: 20 TABLET | Refills: 0 | Status: SHIPPED | OUTPATIENT
Start: 2023-12-07 | End: 2023-12-17

## 2023-12-07 RX ORDER — PREDNISONE 10 MG/1
10 TABLET ORAL 3 TIMES DAILY
Qty: 9 TABLET | Refills: 0 | Status: SHIPPED | OUTPATIENT
Start: 2023-12-07 | End: 2023-12-10

## 2023-12-07 RX ORDER — BENZONATATE 200 MG/1
200 CAPSULE ORAL 3 TIMES DAILY PRN
Qty: 30 CAPSULE | Refills: 0 | Status: SHIPPED | OUTPATIENT
Start: 2023-12-07 | End: 2023-12-17

## 2023-12-07 ASSESSMENT — ENCOUNTER SYMPTOMS
ACTIVITY CHANGE: 1
APPETITE CHANGE: 1
SHORTNESS OF BREATH: 0
FEVER: 1
SORE THROAT: 1
WHEEZING: 0
CHILLS: 1
FATIGUE: 1
COUGH: 1

## 2023-12-07 NOTE — PROGRESS NOTES
Subjective   Patient ID: Domo Arcos is a 53 y.o. male who presents for Cough (Domo is here for cough congestion and headaches. He has been dealing with this since his last visit. Just comes and goes. Has been having cold sweats. ).  Patient presents today with complaints of cough, chills, night sweats for the past few weeks.   He has not felt 100% since his last visit.   He has been in contact with a coworker who also is sick.             Vitals:    12/07/23 0848   BP: 140/80   Pulse: 84   Temp: 35.9 °C (96.6 °F)   SpO2: 94%       Review of Systems   Constitutional:  Positive for activity change, appetite change, chills, fatigue and fever.   HENT:  Positive for congestion and sore throat.    Respiratory:  Positive for cough. Negative for shortness of breath and wheezing.    All other systems reviewed and are negative.      Objective   Physical Exam  Constitutional:       General: He is not in acute distress.     Appearance: Normal appearance.   HENT:      Head: Normocephalic.      Right Ear: Tympanic membrane normal.      Left Ear: Tympanic membrane normal.      Nose: Congestion and rhinorrhea present.      Mouth/Throat:      Pharynx: Oropharyngeal exudate and posterior oropharyngeal erythema present.   Eyes:      Extraocular Movements: Extraocular movements intact.      Pupils: Pupils are equal, round, and reactive to light.   Cardiovascular:      Rate and Rhythm: Normal rate and regular rhythm.   Pulmonary:      Effort: No respiratory distress.      Breath sounds: Rhonchi present.   Abdominal:      General: Abdomen is flat. There is no distension.   Skin:     General: Skin is warm and dry.      Capillary Refill: Capillary refill takes less than 2 seconds.   Neurological:      Mental Status: He is alert.   Psychiatric:         Mood and Affect: Mood normal.         Assessment/Plan   Problem List Items Addressed This Visit       Dyslipidemia    Relevant Medications    atorvastatin (Lipitor) 40 mg tablet     Other  Visit Diagnoses       Upper respiratory tract infection, unspecified type    -  Primary    Relevant Medications    doxycycline (Adoxa) 100 mg tablet    predniSONE (Deltasone) 10 mg tablet    benzonatate (Tessalon) 200 mg capsule    Other Relevant Orders    XR chest 2 views                 Thank you for coming in today, please call my office if you have any concerns or questions.     Socrates CARMONA, CNP

## 2023-12-07 NOTE — PATIENT INSTRUCTIONS
Pred burst  Doxy for 10 days    Tessalon perles for 10 days    Thank you for coming in today, if any questions or concerns arise, please call my office.   KRISTINE Collins-CNP

## 2023-12-11 ENCOUNTER — TELEPHONE (OUTPATIENT)
Dept: PRIMARY CARE | Facility: CLINIC | Age: 53
End: 2023-12-11
Payer: COMMERCIAL

## 2023-12-11 NOTE — LETTER
Date: December 15, 2023  RE:  Domo Arcos  :  1970      To Whom It May Concern:    Our patient, Domo, has been under our care please excuse him from work -12/15 and now may return back to work without restrictions.    Their return to work date is:  23    If you have questions concerning this patient's immediate care, please feel free to contact our office at [phone].    Sincerely,          Socrates Castañeda, CNP

## 2023-12-11 NOTE — TELEPHONE ENCOUNTER
Unfortunately with Domo's Anita from Rheumatology, he does not qualify for the paxlovid, as these medicines cause severe reaction when taken together.

## 2023-12-11 NOTE — TELEPHONE ENCOUNTER
Tested positive for Covid last night him and wife. He is on ABX from last week you gave him.  Does he qualify for Paxlovid? Call [phone]

## 2023-12-13 PROCEDURE — RXMED WILLOW AMBULATORY MEDICATION CHARGE

## 2023-12-15 NOTE — TELEPHONE ENCOUNTER
Domo called this morning asking if you would be able to give him a letter excusing him from work this week due to Covid infection. He states his work protocol is to quarantine for 5 days and then return with an excuse letter. He would be returning Monday 12/18/23. He is going to call back with a fax number if you are able to do this.

## 2023-12-27 PROCEDURE — RXMED WILLOW AMBULATORY MEDICATION CHARGE

## 2023-12-28 ENCOUNTER — PHARMACY VISIT (OUTPATIENT)
Dept: PHARMACY | Facility: CLINIC | Age: 53
End: 2023-12-28
Payer: COMMERCIAL

## 2024-01-03 ENCOUNTER — OFFICE VISIT (OUTPATIENT)
Dept: PRIMARY CARE | Facility: CLINIC | Age: 54
End: 2024-01-03
Payer: COMMERCIAL

## 2024-01-03 VITALS
OXYGEN SATURATION: 92 % | SYSTOLIC BLOOD PRESSURE: 130 MMHG | WEIGHT: 249 LBS | BODY MASS INDEX: 39 KG/M2 | HEART RATE: 67 BPM | TEMPERATURE: 97.5 F | DIASTOLIC BLOOD PRESSURE: 90 MMHG

## 2024-01-03 DIAGNOSIS — F41.9 ANXIETY: ICD-10-CM

## 2024-01-03 DIAGNOSIS — G44.009 CLUSTER HEADACHE, NOT INTRACTABLE, UNSPECIFIED CHRONICITY PATTERN: Primary | ICD-10-CM

## 2024-01-03 PROBLEM — F51.04 PSYCHOPHYSIOLOGICAL INSOMNIA: Status: ACTIVE | Noted: 2018-04-30

## 2024-01-03 PROBLEM — J30.1 NON-SEASONAL ALLERGIC RHINITIS DUE TO POLLEN: Status: ACTIVE | Noted: 2017-02-20

## 2024-01-03 PROBLEM — E55.9 VITAMIN D DEFICIENCY: Status: ACTIVE | Noted: 2024-01-03

## 2024-01-03 PROBLEM — D49.7 NEOPLASM OF MENINGES: Status: ACTIVE | Noted: 2023-05-02

## 2024-01-03 PROBLEM — T81.49XA SURGICAL SITE INFECTION: Status: ACTIVE | Noted: 2024-01-03

## 2024-01-03 PROBLEM — D22.60 MELANOCYTIC NEVI OF UNSPECIFIED UPPER LIMB, INCLUDING SHOULDER: Status: ACTIVE | Noted: 2021-03-08

## 2024-01-03 PROBLEM — L72.3 SEBACEOUS CYST: Status: ACTIVE | Noted: 2021-03-08

## 2024-01-03 PROBLEM — Z85.828 PERSONAL HISTORY OF OTHER MALIGNANT NEOPLASM OF SKIN: Status: ACTIVE | Noted: 2021-03-08

## 2024-01-03 PROBLEM — D22.5 MELANOCYTIC NEVI OF TRUNK: Status: ACTIVE | Noted: 2021-03-08

## 2024-01-03 PROBLEM — H66.92 ACUTE OTITIS MEDIA, LEFT: Status: ACTIVE | Noted: 2018-04-30

## 2024-01-03 PROBLEM — H61.22 HEARING LOSS OF LEFT EAR DUE TO CERUMEN IMPACTION: Status: ACTIVE | Noted: 2017-02-20

## 2024-01-03 PROBLEM — H93.13 SUBJECTIVE TINNITUS OF BOTH EARS: Status: ACTIVE | Noted: 2024-01-03

## 2024-01-03 PROBLEM — L57.0 ACTINIC KERATOSIS: Status: ACTIVE | Noted: 2021-03-08

## 2024-01-03 PROBLEM — M54.50 ACUTE BILATERAL LOW BACK PAIN WITHOUT SCIATICA: Status: ACTIVE | Noted: 2017-02-13

## 2024-01-03 PROBLEM — H91.92 HEARING DIFFICULTY OF LEFT EAR: Status: ACTIVE | Noted: 2017-02-13

## 2024-01-03 PROBLEM — R42 DIZZINESS AND GIDDINESS: Status: ACTIVE | Noted: 2017-02-13

## 2024-01-03 PROBLEM — D48.5 NEOPLASM OF UNCERTAIN BEHAVIOR OF SKIN: Status: ACTIVE | Noted: 2021-03-08

## 2024-01-03 PROBLEM — R03.0 ELEVATED BP WITHOUT DIAGNOSIS OF HYPERTENSION: Status: ACTIVE | Noted: 2018-08-03

## 2024-01-03 PROCEDURE — 3075F SYST BP GE 130 - 139MM HG: CPT

## 2024-01-03 PROCEDURE — 3080F DIAST BP >= 90 MM HG: CPT

## 2024-01-03 PROCEDURE — 3008F BODY MASS INDEX DOCD: CPT

## 2024-01-03 PROCEDURE — 99214 OFFICE O/P EST MOD 30 MIN: CPT

## 2024-01-03 PROCEDURE — 96127 BRIEF EMOTIONAL/BEHAV ASSMT: CPT

## 2024-01-03 RX ORDER — LANOLIN ALCOHOL/MO/W.PET/CERES
100 CREAM (GRAM) TOPICAL DAILY
Qty: 30 TABLET | Refills: 0 | Status: SHIPPED | OUTPATIENT
Start: 2024-01-03 | End: 2024-02-07

## 2024-01-03 RX ORDER — SERTRALINE HYDROCHLORIDE 50 MG/1
50 TABLET, FILM COATED ORAL DAILY
Qty: 30 TABLET | Refills: 0 | Status: SHIPPED | OUTPATIENT
Start: 2024-01-03 | End: 2024-02-07 | Stop reason: SINTOL

## 2024-01-03 NOTE — PROGRESS NOTES
"Subjective   Patient ID: Domo Arcos is a 53 y.o. male who presents for Depression (Domo is here to discuss depression medication.  He doesn't feel like its enough. Is sleeping all day long and has no energy. ) and Headache (Domo is here due to having headaches more often lately. Has a lump in his left leg.).  Subjective  Domo Arcos is a 53 y.o. male who presents for follow up of depression. Current symptoms include depressed mood, difficulty concentrating, fatigue, hypersomnia, and weight gain. Symptoms have been gradually worsening since that time. Patient denies suicidal thoughts without plan. Previous treatment includes: medication. He complains of the following side effects from the treatment: weight gain.    Objective  /90   Pulse 67   Temp 36.4 °C (97.5 °F)   Wt 113 kg (249 lb)   SpO2 92%   BMI 39.00 kg/m²    General:  alert and oriented, in no acute distress and appears stated age  Affect & Behavior:  full facial expressions flattened affect and slowed speech    Assessment/Plan  Depression, gradually worsening.  Medications: Zoloft      Daily headaches  Not taking medicines at this time to treat the headaches  Stopped drinking alcohol recently due to his wife's health issues, states \"I'm an alcoholic\" he did not disclose how many alcoholic drinks he previously consumed    Lipoma left anterior leg  Monitor for now. Movable soft, left anterior leg superior to his knee.    Abdominal bloating with eating, continues to gain weight  Poor diet, discussed exercise, weight loss, improving diet  Consider probiotic to help with gut microbiome  He has been on antibiotic recently.        Vitals:    01/03/24 0915   BP: 130/90   Pulse: 67   Temp: 36.4 °C (97.5 °F)   SpO2: 92%       Review of Systems    Objective   Physical Exam  Vitals and nursing note reviewed.   Skin:            Comments: Lipoma approx 5cm x 5cm   Neurological:      Mental Status: He is alert.   Psychiatric:         Mood and Affect: " Mood is depressed. Affect is flat.         Assessment/Plan   Problem List Items Addressed This Visit    None  Visit Diagnoses       Cluster headache, not intractable, unspecified chronicity pattern    -  Primary    Relevant Medications    thiamine (Vitamin B-1) 100 mg tablet    Anxiety        Relevant Medications    sertraline (Zoloft) 50 mg tablet                 Thank you for coming in today, please call my office if you have any concerns or questions.     Socrates CARMONA, CNP

## 2024-01-03 NOTE — PATIENT INSTRUCTIONS
Check BP at least daily  Check when episodes of headaches occur, continue alcohol cessation, we added Thiamine supplement as well.    Start Zoloft, stop lexapro.   Take Hydroxyzine later in the day, afternoons. This may contribute to your fatigue    Monitor lipoma of leg, this is a fatty benign tumor, if changes we will have you see [facility].  See in 1  month.     Thank you for coming in today, if any questions or concerns arise, please call my office.   KRISTINE Collins-CNP

## 2024-01-08 DIAGNOSIS — I10 PRIMARY HYPERTENSION: ICD-10-CM

## 2024-01-08 RX ORDER — METOPROLOL TARTRATE 50 MG/1
50 TABLET ORAL 2 TIMES DAILY
Qty: 180 TABLET | Refills: 0 | Status: CANCELLED | OUTPATIENT
Start: 2024-01-08

## 2024-01-09 DIAGNOSIS — I10 PRIMARY HYPERTENSION: ICD-10-CM

## 2024-01-09 PROBLEM — D84.821 IMMUNODEFICIENCY DUE TO DRUGS (CODE) (MULTI): Status: ACTIVE | Noted: 2023-07-13

## 2024-01-09 PROBLEM — Z86.16 PERSONAL HISTORY OF COVID-19: Status: ACTIVE | Noted: 2023-04-24

## 2024-01-09 PROBLEM — Z79.60 LONG TERM (CURRENT) USE OF UNSPECIFIED IMMUNOMODULATORS AND IMMUNOSUPPRESSANTS: Status: ACTIVE | Noted: 2023-06-19

## 2024-01-09 PROBLEM — D50.9 IRON DEFICIENCY ANEMIA: Status: ACTIVE | Noted: 2024-01-09

## 2024-01-09 PROBLEM — F41.9 ANXIETY: Status: ACTIVE | Noted: 2023-04-24

## 2024-01-10 ENCOUNTER — PHARMACY VISIT (OUTPATIENT)
Dept: PHARMACY | Facility: CLINIC | Age: 54
End: 2024-01-10
Payer: COMMERCIAL

## 2024-01-10 ENCOUNTER — OFFICE VISIT (OUTPATIENT)
Dept: PRIMARY CARE | Facility: CLINIC | Age: 54
End: 2024-01-10
Payer: COMMERCIAL

## 2024-01-10 VITALS
SYSTOLIC BLOOD PRESSURE: 142 MMHG | WEIGHT: 241 LBS | BODY MASS INDEX: 37.75 KG/M2 | OXYGEN SATURATION: 94 % | DIASTOLIC BLOOD PRESSURE: 88 MMHG | TEMPERATURE: 98.3 F | HEART RATE: 123 BPM

## 2024-01-10 DIAGNOSIS — H65.93 OTITIS MEDIA WITH EFFUSION, BILATERAL: Primary | ICD-10-CM

## 2024-01-10 PROCEDURE — 99214 OFFICE O/P EST MOD 30 MIN: CPT

## 2024-01-10 PROCEDURE — 3079F DIAST BP 80-89 MM HG: CPT

## 2024-01-10 PROCEDURE — RXMED WILLOW AMBULATORY MEDICATION CHARGE

## 2024-01-10 PROCEDURE — 3077F SYST BP >= 140 MM HG: CPT

## 2024-01-10 PROCEDURE — 3008F BODY MASS INDEX DOCD: CPT

## 2024-01-10 RX ORDER — AMOXICILLIN AND CLAVULANATE POTASSIUM 875; 125 MG/1; MG/1
875 TABLET, FILM COATED ORAL 2 TIMES DAILY
Qty: 20 TABLET | Refills: 0 | Status: SHIPPED | OUTPATIENT
Start: 2024-01-10 | End: 2024-01-20

## 2024-01-10 RX ORDER — METOPROLOL TARTRATE 50 MG/1
50 TABLET ORAL 2 TIMES DAILY
Qty: 180 TABLET | Refills: 0 | Status: SHIPPED | OUTPATIENT
Start: 2024-01-10 | End: 2024-04-08 | Stop reason: SDUPTHER

## 2024-01-10 NOTE — PROGRESS NOTES
Subjective   Patient ID: Domo Arcos is a 53 y.o. male who presents for Follow-up (Domo is here for continued feeling sick. He is having yellow phlegm production. The headaches come and go still. ).  Subjective  Domo Arcos is a 53 y.o. male who presents with ear pain and possible ear infection. Symptoms include: bilateral ear drainage  and bilateral ear pain. Onset of symptoms was a few days ago, and have been gradually worsening since that time. Associated symptoms include: congestion and post nasal drip.  Patient denies: fever . He is drinking plenty of fluids.    Objective  /88   Pulse (!) 123   Temp 36.8 °C (98.3 °F)   Wt 109 kg (241 lb)   SpO2 94%   BMI 37.75 kg/m²   General:  alert and oriented, in no acute distress  Right Ear: red, inflamed, and with yellow discharge  Left Ear: red, inflamed, and with yellow discharge  Mouth:  lips, mucosa, and tongue normal; teeth and gums normal  Neck: no adenopathy, no carotid bruit, no JVD, supple, symmetrical, trachea midline, and thyroid not enlarged, symmetric, no tenderness/mass/nodules    Assessment/Plan  Diagnoses and associated orders for this visit:    · Otitis media with effusion, bilateral   amoxicillin-pot clavulanate (Augmentin) 875-125 mg tablet; Take 1 tablet (875 mg) by mouth 2 times a day for 10 days.            Vitals:    01/10/24 1135   BP: 142/88   Pulse: (!) 123   Temp: 36.8 °C (98.3 °F)   SpO2: 94%       Review of Systems    Objective   Physical Exam    Assessment/Plan   Problem List Items Addressed This Visit    None  Visit Diagnoses       Otitis media with effusion, bilateral    -  Primary    Relevant Medications    amoxicillin-pot clavulanate (Augmentin) 875-125 mg tablet                 Thank you for coming in today, please call my office if you have any concerns or questions.     Socrates CARMONA, CNP

## 2024-01-10 NOTE — PATIENT INSTRUCTIONS
Start augmentin twice daily with meals    Thank you for coming in today, if any questions or concerns arise, please call my office.   KRISTINE Collins-CNP

## 2024-01-22 ENCOUNTER — PHARMACY VISIT (OUTPATIENT)
Dept: PHARMACY | Facility: CLINIC | Age: 54
End: 2024-01-22
Payer: COMMERCIAL

## 2024-01-22 PROCEDURE — RXMED WILLOW AMBULATORY MEDICATION CHARGE

## 2024-01-22 RX ORDER — SYRINGE WITH NEEDLE, 1 ML 25GX5/8"
SYRINGE, EMPTY DISPOSABLE MISCELLANEOUS
Qty: 1 EACH | Refills: 0 | OUTPATIENT
Start: 2023-02-09

## 2024-01-22 RX ORDER — ATORVASTATIN CALCIUM 40 MG/1
40 TABLET, FILM COATED ORAL DAILY
Qty: 90 TABLET | Refills: 0 | OUTPATIENT
Start: 2023-10-18 | End: 2024-02-07 | Stop reason: WASHOUT

## 2024-01-22 RX ORDER — LEFLUNOMIDE 20 MG/1
20 TABLET ORAL DAILY
Qty: 90 TABLET | Refills: 0 | OUTPATIENT
Start: 2023-09-18

## 2024-01-23 ENCOUNTER — PHARMACY VISIT (OUTPATIENT)
Dept: PHARMACY | Facility: CLINIC | Age: 54
End: 2024-01-23
Payer: COMMERCIAL

## 2024-01-23 PROCEDURE — RXMED WILLOW AMBULATORY MEDICATION CHARGE

## 2024-01-23 RX ORDER — FOLIC ACID 1 MG/1
1 TABLET ORAL DAILY
Qty: 30 TABLET | Refills: 9 | OUTPATIENT
Start: 2023-08-07

## 2024-01-26 ENCOUNTER — SPECIALTY PHARMACY (OUTPATIENT)
Dept: PHARMACY | Facility: CLINIC | Age: 54
End: 2024-01-26

## 2024-02-02 PROCEDURE — RXMED WILLOW AMBULATORY MEDICATION CHARGE

## 2024-02-06 ENCOUNTER — PHARMACY VISIT (OUTPATIENT)
Dept: PHARMACY | Facility: CLINIC | Age: 54
End: 2024-02-06
Payer: COMMERCIAL

## 2024-02-07 ENCOUNTER — OFFICE VISIT (OUTPATIENT)
Dept: PRIMARY CARE | Facility: CLINIC | Age: 54
End: 2024-02-07
Payer: COMMERCIAL

## 2024-02-07 VITALS
TEMPERATURE: 96.9 F | WEIGHT: 246 LBS | OXYGEN SATURATION: 98 % | DIASTOLIC BLOOD PRESSURE: 90 MMHG | BODY MASS INDEX: 38.53 KG/M2 | SYSTOLIC BLOOD PRESSURE: 140 MMHG | HEART RATE: 90 BPM

## 2024-02-07 DIAGNOSIS — F41.9 ANXIETY: Primary | ICD-10-CM

## 2024-02-07 PROCEDURE — 99214 OFFICE O/P EST MOD 30 MIN: CPT

## 2024-02-07 PROCEDURE — 3077F SYST BP >= 140 MM HG: CPT

## 2024-02-07 PROCEDURE — 3008F BODY MASS INDEX DOCD: CPT

## 2024-02-07 PROCEDURE — 3080F DIAST BP >= 90 MM HG: CPT

## 2024-02-07 RX ORDER — ESCITALOPRAM OXALATE 20 MG/1
30 TABLET ORAL DAILY
Qty: 45 TABLET | Refills: 0 | Status: SHIPPED | OUTPATIENT
Start: 2024-02-07 | End: 2024-03-07 | Stop reason: SDUPTHER

## 2024-02-07 RX ORDER — HYDROXYZINE HYDROCHLORIDE 10 MG/1
10 TABLET, FILM COATED ORAL EVERY 6 HOURS PRN
Qty: 120 TABLET | Refills: 0 | Status: SHIPPED | OUTPATIENT
Start: 2024-02-07 | End: 2024-03-08

## 2024-02-07 NOTE — PATIENT INSTRUCTIONS
Follow in 4 weeks  Call my office and update my MA regarding progress    Call in 2 days if flare up does not improve    Thank you for coming in today, if any questions or concerns arise, please call my office.   KRISTINE Collins-CNP

## 2024-02-07 NOTE — PROGRESS NOTES
Subjective   Patient ID: Domo Arcos is a 53 y.o. male who presents for Follow-up (Domo is here for a follow up of mood. His medication he was on seemed to make thing worse so he has been out for 5 days. Had an episode last night where spouse offered to call the squad. ).  Subjective  Domo Arcos is a 53 y.o. male who presents for follow up of anxiety disorder. Current symptoms: difficulty concentrating, irritable, psychomotor agitation. He denies current suicidal and homicidal ideation. He complains of the following side effects from the treatment: none.     Objective  /90   Pulse 90   Temp 36.1 °C (96.9 °F)   Wt 112 kg (246 lb)   SpO2 98%   BMI 38.53 kg/m²    General:    alert and oriented, in no acute distress  Affect/Behavior:     good grooming and good insight, does have pressured speech    Assessment/Plan  Anxiety Disorder - worsening  Medications: Lexapro restart.          Vitals:    02/07/24 0923   BP: 140/90   Pulse: 90   Temp: 36.1 °C (96.9 °F)   SpO2: 98%       Review of Systems    Objective   Physical Exam  Vitals and nursing note reviewed.   Neurological:      Mental Status: He is alert.   Psychiatric:         Mood and Affect: Mood is anxious.         Assessment/Plan   Problem List Items Addressed This Visit       Anxiety - Primary    Relevant Medications    escitalopram (Lexapro) 20 mg tablet    hydrOXYzine HCL (Atarax) 10 mg tablet            Thank you for coming in today, please call my office if you have any concerns or questions.     Socrates CARMONA, CNP

## 2024-03-07 ENCOUNTER — APPOINTMENT (OUTPATIENT)
Dept: PRIMARY CARE | Facility: CLINIC | Age: 54
End: 2024-03-07
Payer: COMMERCIAL

## 2024-03-07 ENCOUNTER — OFFICE VISIT (OUTPATIENT)
Dept: PRIMARY CARE | Facility: CLINIC | Age: 54
End: 2024-03-07
Payer: COMMERCIAL

## 2024-03-07 VITALS
WEIGHT: 244 LBS | DIASTOLIC BLOOD PRESSURE: 80 MMHG | TEMPERATURE: 96.7 F | SYSTOLIC BLOOD PRESSURE: 130 MMHG | HEART RATE: 79 BPM | OXYGEN SATURATION: 97 % | BODY MASS INDEX: 38.22 KG/M2

## 2024-03-07 DIAGNOSIS — F41.9 ANXIETY: ICD-10-CM

## 2024-03-07 DIAGNOSIS — L73.2 HIDRADENITIS SUPPURATIVA: ICD-10-CM

## 2024-03-07 DIAGNOSIS — F32.A DEPRESSION, UNSPECIFIED DEPRESSION TYPE: Primary | ICD-10-CM

## 2024-03-07 DIAGNOSIS — Z12.11 COLON CANCER SCREENING: ICD-10-CM

## 2024-03-07 PROCEDURE — 3075F SYST BP GE 130 - 139MM HG: CPT

## 2024-03-07 PROCEDURE — 3079F DIAST BP 80-89 MM HG: CPT

## 2024-03-07 PROCEDURE — 99214 OFFICE O/P EST MOD 30 MIN: CPT

## 2024-03-07 PROCEDURE — 3008F BODY MASS INDEX DOCD: CPT

## 2024-03-07 PROCEDURE — 87070 CULTURE OTHR SPECIMN AEROBIC: CPT

## 2024-03-07 PROCEDURE — 87205 SMEAR GRAM STAIN: CPT

## 2024-03-07 PROCEDURE — 87186 SC STD MICRODIL/AGAR DIL: CPT

## 2024-03-07 PROCEDURE — 87075 CULTR BACTERIA EXCEPT BLOOD: CPT

## 2024-03-07 RX ORDER — ESCITALOPRAM OXALATE 20 MG/1
30 TABLET ORAL DAILY
Qty: 135 TABLET | Refills: 1 | Status: SHIPPED | OUTPATIENT
Start: 2024-03-07 | End: 2024-09-03

## 2024-03-07 NOTE — PROGRESS NOTES
Subjective   Patient ID: Domo Arcos is a 53 y.o. male who presents for Follow-up (Domo is here for his follow up. Has been having some skin issues everywhere, has been getting boils. Has 2 around the waist line. ).  Lexapro follow up  He is on 30mg daily  --mood is exponentially improved. Return in 6 months follow up.    Boils, long standing issue, years.  --Worse lately over the past couple of weeks.  --Dial soap since spine surgery.   --see DERM  --wound culture done          Vitals:    03/07/24 1521   BP: 130/80   Pulse: 79   Temp: 35.9 °C (96.7 °F)   SpO2: 97%       Review of Systems    Objective   Physical Exam  Skin:               Assessment/Plan   Problem List Items Addressed This Visit       Depression - Primary    Anxiety    Relevant Medications    escitalopram (Lexapro) 20 mg tablet     Other Visit Diagnoses       Colon cancer screening        Relevant Orders    Colonoscopy Screening; Average Risk Patient    Hidradenitis suppurativa        Relevant Orders    Referral to Dermatology                 Thank you for coming in today, please call my office if you have any concerns or questions.     Socrates CARMONA, CNP

## 2024-03-07 NOTE — PATIENT INSTRUCTIONS
Culture pending on wound  See in 6 months follow up mood, anytime sooner    Thank you for coming in today, if any questions or concerns arise, please call my office.   Socrates Castañeda, KRISTINE-CNP

## 2024-03-08 DIAGNOSIS — Z12.11 SCREEN FOR COLON CANCER: ICD-10-CM

## 2024-03-08 RX ORDER — SODIUM, POTASSIUM,MAG SULFATES 17.5-3.13G
SOLUTION, RECONSTITUTED, ORAL ORAL
Qty: 354 ML | Refills: 0 | Status: SHIPPED | OUTPATIENT
Start: 2024-03-08

## 2024-03-10 LAB
BACTERIA SPEC CULT: ABNORMAL
GRAM STN SPEC: ABNORMAL
GRAM STN SPEC: ABNORMAL

## 2024-03-11 ENCOUNTER — TELEPHONE (OUTPATIENT)
Dept: PRIMARY CARE | Facility: CLINIC | Age: 54
End: 2024-03-11
Payer: COMMERCIAL

## 2024-03-11 DIAGNOSIS — L73.2 HIDRADENITIS SUPPURATIVA: Primary | ICD-10-CM

## 2024-03-11 PROCEDURE — RXMED WILLOW AMBULATORY MEDICATION CHARGE

## 2024-03-11 RX ORDER — DOXYCYCLINE 100 MG/1
100 TABLET ORAL 2 TIMES DAILY
Qty: 20 TABLET | Refills: 0 | Status: SHIPPED | OUTPATIENT
Start: 2024-03-11 | End: 2024-03-21

## 2024-03-11 NOTE — TELEPHONE ENCOUNTER
----- Message from KRISTINE Collins-CNP sent at 3/11/2024  8:06 AM EDT -----  Results were abnormal... culture is abnormal, I sent over 10 days doxycycline

## 2024-03-15 ENCOUNTER — PHARMACY VISIT (OUTPATIENT)
Dept: PHARMACY | Facility: CLINIC | Age: 54
End: 2024-03-15
Payer: COMMERCIAL

## 2024-03-19 PROCEDURE — RXMED WILLOW AMBULATORY MEDICATION CHARGE

## 2024-03-20 ENCOUNTER — PHARMACY VISIT (OUTPATIENT)
Dept: PHARMACY | Facility: CLINIC | Age: 54
End: 2024-03-20
Payer: COMMERCIAL

## 2024-03-28 DIAGNOSIS — I10 PRIMARY HYPERTENSION: ICD-10-CM

## 2024-03-29 RX ORDER — LISINOPRIL 10 MG/1
10 TABLET ORAL 2 TIMES DAILY
Qty: 360 TABLET | Refills: 0 | Status: SHIPPED | OUTPATIENT
Start: 2024-03-29

## 2024-04-08 DIAGNOSIS — I10 PRIMARY HYPERTENSION: ICD-10-CM

## 2024-04-08 PROCEDURE — RXMED WILLOW AMBULATORY MEDICATION CHARGE

## 2024-04-08 RX ORDER — METOPROLOL TARTRATE 50 MG/1
50 TABLET ORAL 2 TIMES DAILY
Qty: 180 TABLET | Refills: 1 | Status: SHIPPED | OUTPATIENT
Start: 2024-04-08

## 2024-04-09 ENCOUNTER — PHARMACY VISIT (OUTPATIENT)
Dept: PHARMACY | Facility: CLINIC | Age: 54
End: 2024-04-09
Payer: COMMERCIAL

## 2024-04-12 ENCOUNTER — PHARMACY VISIT (OUTPATIENT)
Dept: PHARMACY | Facility: CLINIC | Age: 54
End: 2024-04-12
Payer: COMMERCIAL

## 2024-04-12 PROCEDURE — RXMED WILLOW AMBULATORY MEDICATION CHARGE

## 2024-04-30 PROCEDURE — RXMED WILLOW AMBULATORY MEDICATION CHARGE

## 2024-05-02 ENCOUNTER — PHARMACY VISIT (OUTPATIENT)
Dept: PHARMACY | Facility: CLINIC | Age: 54
End: 2024-05-02
Payer: COMMERCIAL

## 2024-05-13 PROCEDURE — RXMED WILLOW AMBULATORY MEDICATION CHARGE

## 2024-05-14 ENCOUNTER — PHARMACY VISIT (OUTPATIENT)
Dept: PHARMACY | Facility: CLINIC | Age: 54
End: 2024-05-14
Payer: COMMERCIAL

## 2024-05-20 ENCOUNTER — OFFICE VISIT (OUTPATIENT)
Dept: RHEUMATOLOGY | Facility: CLINIC | Age: 54
End: 2024-05-20
Payer: COMMERCIAL

## 2024-05-20 ENCOUNTER — LAB (OUTPATIENT)
Dept: LAB | Facility: LAB | Age: 54
End: 2024-05-20
Payer: COMMERCIAL

## 2024-05-20 VITALS
DIASTOLIC BLOOD PRESSURE: 100 MMHG | BODY MASS INDEX: 37.35 KG/M2 | WEIGHT: 238 LBS | SYSTOLIC BLOOD PRESSURE: 148 MMHG | HEIGHT: 67 IN | HEART RATE: 86 BPM

## 2024-05-20 DIAGNOSIS — Z79.899 ENCOUNTER FOR LONG-TERM (CURRENT) USE OF MEDICATIONS: ICD-10-CM

## 2024-05-20 DIAGNOSIS — M05.9 SEROPOSITIVE RHEUMATOID ARTHRITIS (MULTI): ICD-10-CM

## 2024-05-20 DIAGNOSIS — M05.9 SEROPOSITIVE RHEUMATOID ARTHRITIS (MULTI): Primary | ICD-10-CM

## 2024-05-20 DIAGNOSIS — M54.2 NECK PAIN: ICD-10-CM

## 2024-05-20 LAB
ALBUMIN SERPL BCP-MCNC: 4.1 G/DL (ref 3.4–5)
ALP SERPL-CCNC: 77 U/L (ref 33–120)
ALT SERPL W P-5'-P-CCNC: 22 U/L (ref 10–52)
ANION GAP SERPL CALC-SCNC: 15 MMOL/L (ref 10–20)
AST SERPL W P-5'-P-CCNC: 24 U/L (ref 9–39)
BILIRUB SERPL-MCNC: 0.6 MG/DL (ref 0–1.2)
BUN SERPL-MCNC: 16 MG/DL (ref 6–23)
CALCIUM SERPL-MCNC: 9.2 MG/DL (ref 8.6–10.3)
CHLORIDE SERPL-SCNC: 106 MMOL/L (ref 98–107)
CO2 SERPL-SCNC: 25 MMOL/L (ref 21–32)
CREAT SERPL-MCNC: 0.86 MG/DL (ref 0.5–1.3)
CRP SERPL-MCNC: 1.46 MG/DL
EGFRCR SERPLBLD CKD-EPI 2021: >90 ML/MIN/1.73M*2
ERYTHROCYTE [DISTWIDTH] IN BLOOD BY AUTOMATED COUNT: 14.2 % (ref 11.5–14.5)
ERYTHROCYTE [SEDIMENTATION RATE] IN BLOOD BY WESTERGREN METHOD: 67 MM/H (ref 0–20)
GLUCOSE SERPL-MCNC: 138 MG/DL (ref 74–99)
HCT VFR BLD AUTO: 41.5 % (ref 41–52)
HGB BLD-MCNC: 13.4 G/DL (ref 13.5–17.5)
MCH RBC QN AUTO: 28.5 PG (ref 26–34)
MCHC RBC AUTO-ENTMCNC: 32.3 G/DL (ref 32–36)
MCV RBC AUTO: 88 FL (ref 80–100)
NRBC BLD-RTO: 0 /100 WBCS (ref 0–0)
PLATELET # BLD AUTO: 276 X10*3/UL (ref 150–450)
POTASSIUM SERPL-SCNC: 3.9 MMOL/L (ref 3.5–5.3)
PROT SERPL-MCNC: 6.8 G/DL (ref 6.4–8.2)
RBC # BLD AUTO: 4.7 X10*6/UL (ref 4.5–5.9)
SODIUM SERPL-SCNC: 142 MMOL/L (ref 136–145)
WBC # BLD AUTO: 10.6 X10*3/UL (ref 4.4–11.3)

## 2024-05-20 PROCEDURE — 80053 COMPREHEN METABOLIC PANEL: CPT

## 2024-05-20 PROCEDURE — 85652 RBC SED RATE AUTOMATED: CPT

## 2024-05-20 PROCEDURE — 99213 OFFICE O/P EST LOW 20 MIN: CPT | Performed by: INTERNAL MEDICINE

## 2024-05-20 PROCEDURE — 86140 C-REACTIVE PROTEIN: CPT

## 2024-05-20 PROCEDURE — 3077F SYST BP >= 140 MM HG: CPT | Performed by: INTERNAL MEDICINE

## 2024-05-20 PROCEDURE — 3080F DIAST BP >= 90 MM HG: CPT | Performed by: INTERNAL MEDICINE

## 2024-05-20 PROCEDURE — 85027 COMPLETE CBC AUTOMATED: CPT

## 2024-05-20 PROCEDURE — 36415 COLL VENOUS BLD VENIPUNCTURE: CPT

## 2024-05-20 ASSESSMENT — ENCOUNTER SYMPTOMS
ANAL BLEEDING: 0
FATIGUE: 1
SLEEP DISTURBANCE: 0

## 2024-05-20 ASSESSMENT — PAIN SCALES - GENERAL: PAINLEVEL: 6

## 2024-05-20 NOTE — PROGRESS NOTES
Subjective   Patient ID: Domo Arcos is a 53 y.o. male who presents for Rheumatoid Arthritis (6 month follow up /Right knee and neck pain for about a week ).  HPI  Patient with history of seropositive rheumatoid arthritis.  Previously had been on Humira and methotrexate.  Had been found to have a cyst on his spine followed by neurosurgeon Dr. Rico.  Previously had been on allopurinol for elevated uric acid.    At his last visit we had him continue on Xeljanz and leflunomide.  He has had some pains in the last week and his right side of the neck and also in his right knee.  He denies any infections.    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  His wife has had some health issues.    He usually sees his provider once a year.  He feels that he had some muscular chest pain  Review of Systems   Constitutional:  Positive for fatigue.   HENT:          No dental issues   Eyes:         Wears glasses, vision getting worse.     Respiratory:          +tob use   Cardiovascular:  Negative for chest pain.   Gastrointestinal:  Negative for anal bleeding.   Musculoskeletal:         Per HPI   Skin:         Cysts on back   Psychiatric/Behavioral:  Negative for sleep disturbance.        Objective   Physical Exam  GEN: NAD A&O x3 appropriate affect  EYES: no conjunctival redness, eyelids normal  LYMPH: no cervical lymphadenopathy  NEURO: 5/5 strength upper and lower extremities, DTR +2 bicep and patellar tendons  SKIN:no rashes  PULSES: +radials  TENDER POINTS: 0/18   MSK:  Chronic changes in the DIP PIP MCP and wrists but no swelling or tenderness of these joints.  Contracture of the right elbow mild decreased range of motion bilateral shoulders.  Has swelling more on the right knee than the left and hypertrophic changes medially.  Also has some mild warmth on the right more than the left  Tenderness at the base of the right neck and trapezius  Assessment/Plan        Seropositive rheumatoid arthritis. -Previously had been  on Humira and methotrexate   -Currently on Xeljanz and leflunomide which I do feel to be helpful for his musculoskeletal symptoms.  I will have him continue with this.   -He is a positive smoker and had last lipid panel that showed some mild hyperlipidemia.  Discussed about getting CT cardiac scoring and discussed it with his PCP    Neck pain discussed using heat and massage.  He defers injection into the right knee.  Does not think that a muscle relaxer would really help.    We will have him come back in 6 months or as needed.

## 2024-05-22 ENCOUNTER — TELEPHONE (OUTPATIENT)
Dept: PRIMARY CARE | Facility: CLINIC | Age: 54
End: 2024-05-22
Payer: COMMERCIAL

## 2024-06-11 PROCEDURE — RXMED WILLOW AMBULATORY MEDICATION CHARGE

## 2024-06-12 DIAGNOSIS — E78.5 DYSLIPIDEMIA: ICD-10-CM

## 2024-06-12 DIAGNOSIS — D50.8 OTHER IRON DEFICIENCY ANEMIA: ICD-10-CM

## 2024-06-13 ENCOUNTER — PHARMACY VISIT (OUTPATIENT)
Dept: PHARMACY | Facility: CLINIC | Age: 54
End: 2024-06-13
Payer: COMMERCIAL

## 2024-06-13 PROCEDURE — RXMED WILLOW AMBULATORY MEDICATION CHARGE

## 2024-06-13 RX ORDER — ATORVASTATIN CALCIUM 40 MG/1
40 TABLET, FILM COATED ORAL DAILY
Qty: 90 TABLET | Refills: 0 | Status: SHIPPED | OUTPATIENT
Start: 2024-06-13 | End: 2024-12-10

## 2024-06-13 RX ORDER — FERROUS SULFATE 325(65) MG
1 TABLET ORAL DAILY
Qty: 90 TABLET | Refills: 0 | Status: SHIPPED | OUTPATIENT
Start: 2024-06-13

## 2024-06-14 DIAGNOSIS — I10 PRIMARY HYPERTENSION: ICD-10-CM

## 2024-06-14 RX ORDER — LISINOPRIL 10 MG/1
10 TABLET ORAL 2 TIMES DAILY
Qty: 360 TABLET | Refills: 0 | Status: SHIPPED | OUTPATIENT
Start: 2024-06-14

## 2024-06-17 ENCOUNTER — ANESTHESIA EVENT (OUTPATIENT)
Dept: ANESTHESIOLOGY | Facility: HOSPITAL | Age: 54
End: 2024-06-17

## 2024-06-17 ENCOUNTER — PRE-ADMISSION TESTING (OUTPATIENT)
Dept: PREADMISSION TESTING | Facility: HOSPITAL | Age: 54
End: 2024-06-17
Payer: COMMERCIAL

## 2024-06-17 VITALS — WEIGHT: 238 LBS | HEIGHT: 67 IN | BODY MASS INDEX: 37.35 KG/M2

## 2024-06-17 SDOH — HEALTH STABILITY: MENTAL HEALTH: CURRENT SMOKER: 1

## 2024-06-17 ASSESSMENT — DUKE ACTIVITY SCORE INDEX (DASI)
CAN YOU DO LIGHT WORK AROUND THE HOUSE LIKE DUSTING OR WASHING DISHES: YES
CAN YOU DO YARD WORK LIKE RAKING LEAVES, WEEDING OR PUSHING A MOWER: YES
CAN YOU WALK INDOORS, SUCH AS AROUND YOUR HOUSE: YES
CAN YOU CLIMB A FLIGHT OF STAIRS OR WALK UP A HILL: YES
CAN YOU TAKE CARE OF YOURSELF (EAT, DRESS, BATHE, OR USE TOILET): YES
CAN YOU HAVE SEXUAL RELATIONS: YES
CAN YOU RUN A SHORT DISTANCE: YES
CAN YOU DO MODERATE WORK AROUND THE HOUSE LIKE VACUUMING, SWEEPING FLOORS OR CARRYING GROCERIES: YES
CAN YOU PARTICIPATE IN MODERATE RECREATIONAL ACTIVITIES LIKE GOLF, BOWLING, DANCING, DOUBLES TENNIS OR THROWING A BASEBALL OR FOOTBALL: YES
CAN YOU WALK A BLOCK OR TWO ON LEVEL GROUND: YES
CAN YOU PARTICIPATE IN STRENOUS SPORTS LIKE SWIMMING, SINGLES TENNIS, FOOTBALL, BASKETBALL, OR SKIING: YES
DASI METS SCORE: 9.9
CAN YOU DO HEAVY WORK AROUND THE HOUSE LIKE SCRUBBING FLOORS OR LIFTING AND MOVING HEAVY FURNITURE: YES
TOTAL_SCORE: 58.2

## 2024-06-17 ASSESSMENT — PAIN - FUNCTIONAL ASSESSMENT: PAIN_FUNCTIONAL_ASSESSMENT: 0-10

## 2024-06-17 NOTE — ANESTHESIA PREPROCEDURE EVALUATION
Patient: Domo Arcos    Procedure Information    Date: 06/17/24  Reason: pat         Relevant Problems   Cardiac   (+) Atypical chest pain   (+) Hypertension      Neuro   (+) Anxiety   (+) Cervical radiculopathy, acute   (+) Depression   (+) Radiculopathy      Endocrine   (+) Obesity      Hematology   (+) Anemia   (+) Iron deficiency anemia   (+) Pernicious anemia      Musculoskeletal   (+) Rheumatoid arthritis (Multi)      HEENT   (+) Acute maxillary sinusitis   (+) Hearing loss of left ear due to cerumen impaction   (+) Mixed hearing loss, bilateral   (+) Sinus drainage      ID   (+) Surgical site infection      Skin   (+) Rash       Clinical information reviewed:   Tobacco  Allergies  Meds   Med Hx  Surg Hx   Fam Hx  Soc Hx        NPO Detail:  No data recorded     PHYSICAL EXAM    Anesthesia Plan    History of general anesthesia?: yes  History of complications of general anesthesia?: unknown/emergency    ASA 3     MAC     The patient is a current smoker.

## 2024-06-17 NOTE — PREPROCEDURE INSTRUCTIONS
"   Medication List            Accurate as of June 17, 2024  9:36 AM. Always use your most recent med list.                atorvastatin 40 mg tablet  Commonly known as: Lipitor  Take 1 tablet (40 mg) by mouth once daily.  Medication Adjustments for Surgery: Continue until night before surgery     BD Luer-Val Syringe 3 mL 23 x 1\" syringe  Generic drug: syringe with needle  use as directed . once a month  Medication Adjustments for Surgery: Continue until night before surgery     cholecalciferol 50 mcg (2,000 unit) capsule  Commonly known as: Vitamin D-3  Medication Adjustments for Surgery: Stop 7 days before surgery     cyanocobalamin 1,000 mcg/mL injection  Commonly known as: Vitamin B-12  Inject 1 mL (1,000 mcg) into the shoulder, thigh, or buttocks every 30 (thirty) days.  Medication Adjustments for Surgery: Stop 7 days before surgery     escitalopram 20 mg tablet  Commonly known as: Lexapro  Take 1.5 tablets (30 mg) by mouth once daily.  Medication Adjustments for Surgery: Continue until night before surgery     FeroSuL tablet  Generic drug: ferrous sulfate (325 mg ferrous sulfate)  Take 1 tablet (325 mg) by mouth once daily as directed  Medication Adjustments for Surgery: Stop 7 days before surgery     folic acid 1 mg tablet  Commonly known as: Folvite  Take 1 tablet (1 mg) by mouth once daily.  Medication Adjustments for Surgery: Stop 7 days before surgery     hydrOXYzine HCL 10 mg tablet  Commonly known as: Atarax  Take 1 tablet (10 mg) by mouth every 6 hours if needed for anxiety.  Medication Adjustments for Surgery: Continue until night before surgery     * leflunomide 20 mg tablet  Commonly known as: Arava  Take 1 tablet (20 mg) by mouth once daily.  Medication Adjustments for Surgery: Other (Comment)  Notes to patient: Reach out to Dr. Adams office to see if medication needs held prior to procedure.     * leflunomide 20 mg tablet  Commonly known as: Arava  Take 1 tablet (20 mg) by mouth once " daily.  Medication Adjustments for Surgery: Other (Comment)     lisinopril 10 mg tablet  Take 1 tablet (10 mg) by mouth 2 times a day.  Medication Adjustments for Surgery: Take morning of surgery with sip of water, no other fluids     metoprolol tartrate 50 mg tablet  Commonly known as: Lopressor  Take 1 tablet by mouth 2 times a day.  Medication Adjustments for Surgery: Take morning of surgery with sip of water, no other fluids     sodium,potassium,mag sulfates 17.5-3.13-1.6 gram recon soln solution  Commonly known as: Suprep Bowel Prep Kit  Take one bottle twice as directed by prep directions  Medication Adjustments for Surgery: Other (Comment)     Xeljanz XR 11 mg tablet extended release 24 hr  Generic drug: tofacitinib ER  Take 1 tablet (11 mg) by mouth once daily.  Medication Adjustments for Surgery: Other (Comment)  Notes to patient: Reach out to Dr. Adams office to see if medication needs held prior to procedure.           * This list has 2 medication(s) that are the same as other medications prescribed for you. Read the directions carefully, and ask your doctor or other care provider to review them with you.                              Call outpatient surgery Friday, July 5th  before between 1-3 pm to get your arrival time.   [phone]. Please do not go by time listed on [facility], you must still call outpatient surgery to find out your time.    No solid foods the day before your procedure unless otherwise specified.    You can have clear liquids up to 3 hrs prior to your procedure.  NO DAIRY, NO CREAMER, NO NON DAIRY OR ALMOND, OAT, COCONUT ETC.    Please refrain from smoking THC, cigarettes or vaping prior to your procedure at least 24 hrs.     When  you arrive park in the back ER parking lot.  Come through the ER lobby and take the first elevator to second floor.   Check in on the outpatient surgery window.    Leave all valuables at home and remove all piercing's.      Do mouth wash and bath for  infection control if applicable.      NO driving for 24 hrs if you have had anesthesia or sedation.   You will also need a  if you are receiving sedation.       Bring glasses so you can read what you are signing.

## 2024-07-02 ENCOUNTER — PHARMACY VISIT (OUTPATIENT)
Dept: PHARMACY | Facility: CLINIC | Age: 54
End: 2024-07-02
Payer: COMMERCIAL

## 2024-07-02 PROCEDURE — RXMED WILLOW AMBULATORY MEDICATION CHARGE

## 2024-07-08 ENCOUNTER — ANESTHESIA (OUTPATIENT)
Dept: GASTROENTEROLOGY | Facility: HOSPITAL | Age: 54
End: 2024-07-08
Payer: COMMERCIAL

## 2024-07-08 ENCOUNTER — HOSPITAL ENCOUNTER (OUTPATIENT)
Dept: GASTROENTEROLOGY | Facility: HOSPITAL | Age: 54
Setting detail: OUTPATIENT SURGERY
Discharge: HOME | End: 2024-07-08
Payer: COMMERCIAL

## 2024-07-08 ENCOUNTER — ANESTHESIA EVENT (OUTPATIENT)
Dept: GASTROENTEROLOGY | Facility: HOSPITAL | Age: 54
End: 2024-07-08
Payer: COMMERCIAL

## 2024-07-08 VITALS
RESPIRATION RATE: 17 BRPM | SYSTOLIC BLOOD PRESSURE: 98 MMHG | WEIGHT: 238 LBS | HEIGHT: 67 IN | BODY MASS INDEX: 37.35 KG/M2 | TEMPERATURE: 97.2 F | HEART RATE: 77 BPM | DIASTOLIC BLOOD PRESSURE: 53 MMHG | OXYGEN SATURATION: 96 %

## 2024-07-08 DIAGNOSIS — M05.9 SEROPOSITIVE RHEUMATOID ARTHRITIS (MULTI): ICD-10-CM

## 2024-07-08 DIAGNOSIS — Z12.11 COLON CANCER SCREENING: ICD-10-CM

## 2024-07-08 PROCEDURE — 7100000010 HC PHASE TWO TIME - EACH INCREMENTAL 1 MINUTE

## 2024-07-08 PROCEDURE — 2500000005 HC RX 250 GENERAL PHARMACY W/O HCPCS: Performed by: NURSE ANESTHETIST, CERTIFIED REGISTERED

## 2024-07-08 PROCEDURE — 7100000009 HC PHASE TWO TIME - INITIAL BASE CHARGE

## 2024-07-08 PROCEDURE — 2500000004 HC RX 250 GENERAL PHARMACY W/ HCPCS (ALT 636 FOR OP/ED): Performed by: PHYSICIAN ASSISTANT

## 2024-07-08 PROCEDURE — 3700000001 HC GENERAL ANESTHESIA TIME - INITIAL BASE CHARGE

## 2024-07-08 PROCEDURE — 3700000002 HC GENERAL ANESTHESIA TIME - EACH INCREMENTAL 1 MINUTE

## 2024-07-08 PROCEDURE — 2500000004 HC RX 250 GENERAL PHARMACY W/ HCPCS (ALT 636 FOR OP/ED): Performed by: NURSE ANESTHETIST, CERTIFIED REGISTERED

## 2024-07-08 PROCEDURE — 45380 COLONOSCOPY AND BIOPSY: CPT | Performed by: SURGERY

## 2024-07-08 RX ORDER — SODIUM CHLORIDE, SODIUM LACTATE, POTASSIUM CHLORIDE, CALCIUM CHLORIDE 600; 310; 30; 20 MG/100ML; MG/100ML; MG/100ML; MG/100ML
100 INJECTION, SOLUTION INTRAVENOUS CONTINUOUS
Status: DISCONTINUED | OUTPATIENT
Start: 2024-07-08 | End: 2024-07-09 | Stop reason: HOSPADM

## 2024-07-08 RX ORDER — PROPOFOL 10 MG/ML
INJECTION, EMULSION INTRAVENOUS AS NEEDED
Status: DISCONTINUED | OUTPATIENT
Start: 2024-07-08 | End: 2024-07-08

## 2024-07-08 RX ORDER — ONDANSETRON HYDROCHLORIDE 2 MG/ML
4 INJECTION, SOLUTION INTRAVENOUS ONCE AS NEEDED
Status: DISCONTINUED | OUTPATIENT
Start: 2024-07-08 | End: 2024-07-09 | Stop reason: HOSPADM

## 2024-07-08 RX ORDER — FENTANYL CITRATE 50 UG/ML
INJECTION, SOLUTION INTRAMUSCULAR; INTRAVENOUS AS NEEDED
Status: DISCONTINUED | OUTPATIENT
Start: 2024-07-08 | End: 2024-07-08

## 2024-07-08 RX ORDER — LIDOCAINE HYDROCHLORIDE 20 MG/ML
INJECTION, SOLUTION INFILTRATION; PERINEURAL AS NEEDED
Status: DISCONTINUED | OUTPATIENT
Start: 2024-07-08 | End: 2024-07-08

## 2024-07-08 SDOH — HEALTH STABILITY: MENTAL HEALTH: CURRENT SMOKER: 1

## 2024-07-08 ASSESSMENT — PAIN SCALES - GENERAL
PAINLEVEL_OUTOF10: 3
PAINLEVEL_OUTOF10: 0 - NO PAIN
PAIN_LEVEL: 0

## 2024-07-08 ASSESSMENT — COLUMBIA-SUICIDE SEVERITY RATING SCALE - C-SSRS
1. IN THE PAST MONTH, HAVE YOU WISHED YOU WERE DEAD OR WISHED YOU COULD GO TO SLEEP AND NOT WAKE UP?: NO
2. HAVE YOU ACTUALLY HAD ANY THOUGHTS OF KILLING YOURSELF?: NO
6. HAVE YOU EVER DONE ANYTHING, STARTED TO DO ANYTHING, OR PREPARED TO DO ANYTHING TO END YOUR LIFE?: NO

## 2024-07-08 ASSESSMENT — PAIN - FUNCTIONAL ASSESSMENT
PAIN_FUNCTIONAL_ASSESSMENT: 0-10
PAIN_FUNCTIONAL_ASSESSMENT: 0-10

## 2024-07-08 NOTE — ANESTHESIA PREPROCEDURE EVALUATION
Patient: Domo Arcos    Procedure Information       Date/Time: 07/08/24 1000    Scheduled providers: Israel Vera MD    Procedure: COLONOSCOPY    Location: [facility]          Vitals:    07/08/24 0920   BP: 112/75   Pulse: 78   Resp: 17   Temp: 36.6 °C (97.9 °F)   SpO2: 96%       Past Surgical History:   Procedure Laterality Date    APPENDECTOMY  03/01/2017    Appendectomy    OTHER SURGICAL HISTORY Right 01/13/2019    Elbow surgery    SPINE SURGERY      Cyst removed from spine     Past Medical History:   Diagnosis Date    Anemia     Arthritis     Depression     Hypertension     Personal history of other diseases of the musculoskeletal system and connective tissue     History of backache    Radiculopathy, cervical region 02/14/2022    Cervical radiculopathy, acute    Rash and other nonspecific skin eruption 12/07/2022    Rash       Current Outpatient Medications:     atorvastatin (Lipitor) 40 mg tablet, Take 1 tablet (40 mg) by mouth once daily., Disp: 90 tablet, Rfl: 0    cholecalciferol (Vitamin D-3) 50 mcg (2,000 unit) capsule, Take 1 capsule (50 mcg) by mouth early in the morning.., Disp: , Rfl:     cyanocobalamin (Vitamin B-12) 1,000 mcg/mL injection, Inject 1 mL (1,000 mcg) into the shoulder, thigh, or buttocks every 30 (thirty) days., Disp: 3 mL, Rfl: 0    escitalopram (Lexapro) 20 mg tablet, Take 1.5 tablets (30 mg) by mouth once daily., Disp: 135 tablet, Rfl: 1    ferrous sulfate, 325 mg ferrous sulfate, (FeroSuL) tablet, Take 1 tablet (325 mg) by mouth once daily as directed, Disp: 90 tablet, Rfl: 0    folic acid (Folvite) 1 mg tablet, Take 1 tablet (1 mg) by mouth once daily., Disp: 30 tablet, Rfl: 9    leflunomide (Arava) 20 mg tablet, Take 1 tablet (20 mg) by mouth once daily., Disp: 90 tablet, Rfl: 3    lisinopril 10 mg tablet, Take 1 tablet (10 mg) by mouth 2 times a day., Disp: 360 tablet, Rfl: 0    metoprolol tartrate (Lopressor) 50 mg tablet, Take 1 tablet by mouth 2 times a day.,  "Disp: 180 tablet, Rfl: 1    sodium,potassium,mag sulfates (Suprep Bowel Prep Kit) 17.5-3.13-1.6 gram recon soln solution, Take one bottle twice as directed by prep directions, Disp: 354 mL, Rfl: 0    tofacitinib ER (Xeljanz XR) 11 mg tablet extended release 24 hr, Take 1 tablet (11 mg) by mouth once daily., Disp: 30 tablet, Rfl: 5    hydrOXYzine HCL (Atarax) 10 mg tablet, Take 1 tablet (10 mg) by mouth every 6 hours if needed for anxiety. (Patient not taking: Reported on 6/17/2024), Disp: 120 tablet, Rfl: 0    leflunomide (Arava) 20 mg tablet, Take 1 tablet (20 mg) by mouth once daily. (Patient not taking: Reported on 6/17/2024), Disp: 90 tablet, Rfl: 0    syringe with needle (BD Luer-Val Syringe) 3 mL 23 x 1\" syringe, use as directed . once a month, Disp: 1 each, Rfl: 0    Current Facility-Administered Medications:     lactated Ringer's infusion, 100 mL/hr, intravenous, Continuous, Dustin Garcia PA-C, Last Rate: 100 mL/hr at 07/08/24 0929, 100 mL/hr at 07/08/24 0929  Prior to Admission medications    Medication Sig Start Date End Date Taking? Authorizing Provider   atorvastatin (Lipitor) 40 mg tablet Take 1 tablet (40 mg) by mouth once daily. 6/13/24 12/10/24 Yes Natalia Karimi,    cholecalciferol (Vitamin D-3) 50 mcg (2,000 unit) capsule Take 1 capsule (50 mcg) by mouth early in the morning.. 3/15/21  Yes Historical Provider, MD   cyanocobalamin (Vitamin B-12) 1,000 mcg/mL injection Inject 1 mL (1,000 mcg) into the shoulder, thigh, or buttocks every 30 (thirty) days. 10/3/23  Yes REZA Collins   escitalopram (Lexapro) 20 mg tablet Take 1.5 tablets (30 mg) by mouth once daily. 3/7/24 9/3/24 Yes REZA Collins   ferrous sulfate, 325 mg ferrous sulfate, (FeroSuL) tablet Take 1 tablet (325 mg) by mouth once daily as directed 6/13/24  Yes Natalia Karimi,    folic acid (Folvite) 1 mg tablet Take 1 tablet (1 mg) by mouth once daily. 8/7/23  Yes Marissa Adams MD   leflunomide " "(Arava) 20 mg tablet Take 1 tablet (20 mg) by mouth once daily. 11/20/23 11/19/24 Yes Marissa Adams MD   lisinopril 10 mg tablet Take 1 tablet (10 mg) by mouth 2 times a day. 6/14/24  Yes REZA Collins   metoprolol tartrate (Lopressor) 50 mg tablet Take 1 tablet by mouth 2 times a day. 4/8/24  Yes REZA Collins   sodium,potassium,mag sulfates (Suprep Bowel Prep Kit) 17.5-3.13-1.6 gram recon soln solution Take one bottle twice as directed by prep directions 3/8/24  Yes Israel Vera MD   tofacitinib ER (Xeljanz XR) 11 mg tablet extended release 24 hr Take 1 tablet (11 mg) by mouth once daily. 11/21/23 7/18/24 Yes Marissa Adams MD   hydrOXYzine HCL (Atarax) 10 mg tablet Take 1 tablet (10 mg) by mouth every 6 hours if needed for anxiety.  Patient not taking: Reported on 6/17/2024 2/7/24 3/8/24  REZA Collins   leflunomide (Arava) 20 mg tablet Take 1 tablet (20 mg) by mouth once daily.  Patient not taking: Reported on 6/17/2024 9/18/23   Marissa Adams MD   syringe with needle (BD Luer-Val Syringe) 3 mL 23 x 1\" syringe use as directed . once a month 2/9/23   Fam Patel MD     No Known Allergies  Social History     Tobacco Use    Smoking status: Every Day     Current packs/day: 0.25     Average packs/day: 0.3 packs/day for 4.0 years (1.0 ttl pk-yrs)     Types: Cigarettes    Smokeless tobacco: Never   Substance Use Topics    Alcohol use: Yes     Comment: socially         Chemistry    Lab Results   Component Value Date/Time     05/20/2024 1545    K 3.9 05/20/2024 1545     05/20/2024 1545    CO2 25 05/20/2024 1545    BUN 16 05/20/2024 1545    CREATININE 0.86 05/20/2024 1545    Lab Results   Component Value Date/Time    CALCIUM 9.2 05/20/2024 1545    ALKPHOS 77 05/20/2024 1545    AST 24 05/20/2024 1545    ALT 22 05/20/2024 1545    BILITOT 0.6 05/20/2024 1545          Lab Results   Component Value Date/Time    WBC 10.6 05/20/2024 1545    HGB 13.4 (L) " 05/20/2024 1545    HCT 41.5 05/20/2024 1545     05/20/2024 1545     Lab Results   Component Value Date/Time    PROTIME 11.3 05/02/2023 1607    INR 1.0 05/02/2023 1607     No results found for this or any previous visit (from the past 4464 hour(s)).  No results found for this or any previous visit from the past 1095 days.   Relevant Problems   Cardiac   (+) Atypical chest pain   (+) Hypertension      Neuro   (+) Anxiety   (+) Cervical radiculopathy, acute   (+) Depression   (+) Radiculopathy      Endocrine   (+) Obesity      Hematology   (+) Anemia   (+) Iron deficiency anemia   (+) Pernicious anemia      Musculoskeletal   (+) Rheumatoid arthritis (Multi)      HEENT   (+) Acute maxillary sinusitis   (+) Hearing loss of left ear due to cerumen impaction   (+) Mixed hearing loss, bilateral   (+) Sinus drainage      ID   (+) Surgical site infection      Skin   (+) Rash       Clinical information reviewed:   Tobacco  Allergies  Meds   Med Hx  Surg Hx   Fam Hx  Soc Hx        NPO Detail:  NPO/Void Status  Carbohydrate Drink Given Prior to Surgery? : N  Date of Last Liquid: 07/08/24  Time of Last Liquid: 0715  Date of Last Solid: 07/06/24  Time of Last Solid: 1900  Last Intake Type: Clear fluids         Physical Exam    Airway  Mallampati: III     Cardiovascular - normal exam     Dental    Pulmonary - normal exam     Abdominal - normal exam             Anesthesia Plan    History of general anesthesia?: yes  History of complications of general anesthesia?: no    ASA 3     MAC     The patient is a current smoker.  Patient was previously instructed to abstain from smoking on day of procedure.  Patient smoked on day of procedure.    intravenous induction   Anesthetic plan and risks discussed with patient.    Plan discussed with CRNA.

## 2024-07-08 NOTE — ANESTHESIA POSTPROCEDURE EVALUATION
Patient: Domo Arcos    Procedure Summary       Date: 07/08/24 Room / Location: [facility]    Anesthesia Start: 1018 Anesthesia Stop: 1048    Procedure: COLONOSCOPY Diagnosis: Colon cancer screening    Scheduled Providers: Israel Vera MD Responsible Provider: VERONIQUE Bingham    Anesthesia Type: MAC ASA Status: 3            Anesthesia Type: MAC    Vitals Value Taken Time   BP 99/57 07/08/24 1057   Temp 36.2 °C (97.2 °F) 07/08/24 1047   Pulse 80 07/08/24 1057   Resp 16 07/08/24 1057   SpO2 98 % 07/08/24 1057       Anesthesia Post Evaluation    Patient location during evaluation: PACU  Patient participation: complete - patient participated  Level of consciousness: awake and alert  Pain score: 0  Pain management: adequate  Airway patency: patent  Cardiovascular status: acceptable  Respiratory status: acceptable  Hydration status: acceptable  Postoperative Nausea and Vomiting: none        There were no known notable events for this encounter.

## 2024-07-08 NOTE — H&P
History Of Present Illness  Domo Arcos is a 53 y.o. male presenting for a low risk colonoscopy      Past Medical History  Past Medical History:   Diagnosis Date    Anemia     Arthritis     Depression     Hypertension     Personal history of other diseases of the musculoskeletal system and connective tissue     History of backache    Radiculopathy, cervical region 02/14/2022    Cervical radiculopathy, acute    Rash and other nonspecific skin eruption 12/07/2022    Rash       Surgical History  Past Surgical History:   Procedure Laterality Date    APPENDECTOMY  03/01/2017    Appendectomy    OTHER SURGICAL HISTORY Right 01/13/2019    Elbow surgery    SPINE SURGERY      Cyst removed from spine        Social History  He reports that he has been smoking cigarettes. He has a 1 pack-year smoking history. He has never used smokeless tobacco. He reports current alcohol use. He reports that he does not use drugs.    Family History  No family history on file.     Allergies  Patient has no known allergies.    Review of Systems   All other systems reviewed and are negative.       Physical Exam  Vitals reviewed.   Constitutional:       Appearance: Normal appearance.   HENT:      Head: Normocephalic.   Cardiovascular:      Rate and Rhythm: Normal rate and regular rhythm.      Heart sounds: Normal heart sounds.   Pulmonary:      Effort: Pulmonary effort is normal.      Breath sounds: Normal breath sounds.   Abdominal:      General: Abdomen is flat. There is no distension.      Palpations: Abdomen is soft. There is no mass.      Tenderness: There is no abdominal tenderness. There is no guarding.   Musculoskeletal:         General: Normal range of motion.      Cervical back: Normal range of motion.   Skin:     General: Skin is warm.   Neurological:      General: No focal deficit present.   Psychiatric:         Mood and Affect: Mood normal.          Last Recorded Vitals  Blood pressure 112/75, pulse 78, temperature 36.6 °C (97.9 °F),  "temperature source Temporal, resp. rate 17, height 1.702 m (5' 7\"), weight 108 kg (238 lb), SpO2 96%.    COLONOSCOPY/BIOPSIES.  Risks include, but not limited to pain, infection, bleeding, perforation, missed lesions, aspiration, risk of cardiac, pulmonary, neurologic, locomotor, anesthetic events, incomplete colonoscopy, and other unforeseen complications including death      Israel Vera MD    "

## 2024-07-08 NOTE — DISCHARGE INSTRUCTIONS
Patient Instructions after a Colonoscopy      The anesthetics, sedatives or narcotics which were given to you today will be acting in your body for the next 24 hours, so you might feel a little sleepy or groggy.  This feeling should slowly wear off. Carefully read and follow the instructions.     You received sedation today:  - Do not drive or operate any machinery or power tools of any kind.   - No alcoholic beverages today, not even beer or wine.  - Do not make any important decisions or sign any legal documents.  - No over the counter medications that contain alcohol or that may cause drowsiness.  - Do not make any important decisions or sign any legal documents.  - Make sure you have someone with you for first 24 hours.    While it is common to experience mild to moderate abdominal distention, gas, or belching after your procedure, if any of these symptoms occur following discharge from the GI Lab or within one week of having your procedure, call the [facility] to be advised whether a visit to your nearest Urgent Care or Emergency Department is indicated.  Take this paper with you if you go.     - If you develop an allergic reaction to the medications that were given during your procedure such as difficulty breathing, rash, hives, severe nausea, vomiting or lightheadedness.  - If you experience chest pain, shortness of breath, severe abdominal pain, fevers and chills.  -If you develop signs and symptoms of bleeding such as blood in your spit, if your stools turn black, tarry, or bloody  - If you have not urinated within 8 hours following your procedure.  - If your IV site becomes painful, red, inflamed, or looks infected.    If you received a biopsy/polypectomy/sphincterotomy the following instructions apply below:    __ Do not use Aspirin containing products, non-steroidal medications or anti-coagulants for one week following your procedure. (Examples of these types of medications are: Advil,  Arthrotec, Aleve, Coumadin, Ecotrin, Heparin, Ibuprofen, Indocin, Motrin, Naprosyn, Nuprin, Plavix, Vioxx, and Voltarin, or their generic forms.  This list is not all-inclusive.  Check with your physician or pharmacist before resuming medications.)   __ Eat a soft diet today.  Avoid foods that are poorly digested for the next 24 hours.  These foods would include: nuts, beans, lettuce, red meats, and fried foods. Start with liquids and advance your diet as tolerated, gradually work up to eating solids.   __ Do not have a Barium Study or Enema for one week.    Your physician recommends the additional following instructions:    -You have a contact number available for emergencies. The signs and symptoms of potential delayed complications were discussed with you. You may return to normal activities tomorrow.  -Resume your previous diet.  -Continue your present medications.   -We are waiting for your pathology results.  -Your physician has recommended a repeat colonoscopy (date to be determined after pending pathology results are reviewed) for surveillance based on pathology results.  -The findings and recommendations have been discussed with you.  -The findings and recommendations were discussed with your family.  - Please see Medication Reconciliation Form for new medication/medications prescribed.       If you experience any problems or have any questions following discharge from the GI Lab, please call:        Nurse Signature                                                                        Date___________________                                                                            Patient/Responsible Party Signature                                        Date___________________

## 2024-07-09 PROCEDURE — RXMED WILLOW AMBULATORY MEDICATION CHARGE

## 2024-07-09 RX ORDER — TOFACITINIB 11 MG/1
11 TABLET, FILM COATED, EXTENDED RELEASE ORAL DAILY
Qty: 30 TABLET | Refills: 5 | Status: SHIPPED | OUTPATIENT
Start: 2024-07-09 | End: 2025-01-05

## 2024-07-10 ASSESSMENT — PAIN SCALES - GENERAL: PAINLEVEL_OUTOF10: 0 - NO PAIN

## 2024-07-11 ENCOUNTER — SPECIALTY PHARMACY (OUTPATIENT)
Dept: PHARMACY | Facility: CLINIC | Age: 54
End: 2024-07-11

## 2024-07-15 ENCOUNTER — PHARMACY VISIT (OUTPATIENT)
Dept: PHARMACY | Facility: CLINIC | Age: 54
End: 2024-07-15
Payer: COMMERCIAL

## 2024-07-16 ENCOUNTER — TELEPHONE (OUTPATIENT)
Dept: SURGERY | Facility: CLINIC | Age: 54
End: 2024-07-16
Payer: COMMERCIAL

## 2024-07-16 LAB
LABORATORY COMMENT REPORT: NORMAL
PATH REPORT.FINAL DX SPEC: NORMAL
PATH REPORT.GROSS SPEC: NORMAL
PATH REPORT.RELEVANT HX SPEC: NORMAL
PATH REPORT.TOTAL CANCER: NORMAL

## 2024-07-16 NOTE — TELEPHONE ENCOUNTER
----- Message from Israel Vera sent at 7/16/2024 12:30 PM EDT -----  Let patient know polyp was removed and nothing to worry about. A my chart reminder will be sent for a repeat colonoscopy in 5 years

## 2024-07-30 ENCOUNTER — APPOINTMENT (OUTPATIENT)
Dept: DERMATOLOGY | Facility: CLINIC | Age: 54
End: 2024-07-30
Payer: COMMERCIAL

## 2024-07-30 DIAGNOSIS — L57.9 SKIN CHANGES DUE TO CHRONIC EXPOSURE TO NONIONIZING RADIATION: ICD-10-CM

## 2024-07-30 DIAGNOSIS — L81.4 LENTIGO: ICD-10-CM

## 2024-07-30 DIAGNOSIS — L82.1 SEBORRHEIC KERATOSIS: ICD-10-CM

## 2024-07-30 DIAGNOSIS — L73.2 HIDRADENITIS SUPPURATIVA: ICD-10-CM

## 2024-07-30 DIAGNOSIS — D22.9 BENIGN NEVUS: ICD-10-CM

## 2024-07-30 DIAGNOSIS — R21 RASH AND OTHER NONSPECIFIC SKIN ERUPTION: ICD-10-CM

## 2024-07-30 DIAGNOSIS — L70.0 ACNE VULGARIS: ICD-10-CM

## 2024-07-30 DIAGNOSIS — L57.0 ACTINIC KERATOSIS: Primary | ICD-10-CM

## 2024-07-30 DIAGNOSIS — D18.01 ANGIOMA OF SKIN: ICD-10-CM

## 2024-07-30 PROCEDURE — RXMED WILLOW AMBULATORY MEDICATION CHARGE

## 2024-07-30 PROCEDURE — 99204 OFFICE O/P NEW MOD 45 MIN: CPT | Performed by: NURSE PRACTITIONER

## 2024-07-30 PROCEDURE — 17004 DESTROY PREMAL LESIONS 15/>: CPT | Performed by: NURSE PRACTITIONER

## 2024-07-30 RX ORDER — MINOCYCLINE HYDROCHLORIDE 100 MG/1
TABLET ORAL
Qty: 60 TABLET | Refills: 2 | Status: SHIPPED | OUTPATIENT
Start: 2024-07-30

## 2024-07-30 NOTE — PROGRESS NOTES
Subjective     Domo Arocs is a 53 y.o. male who presents for the following: Suspicious Skin Lesion (Skin tags). Lesions have been coming and going for years. Very painful. Family hx of similar lesions (biological brother). No previous treatments mentioned. Flares come and go.     Review of Systems:  No other skin or systemic complaints other than what is documented elsewhere in the note.    The following portions of the chart were reviewed this encounter and updated as appropriate:   Tobacco  Allergies  Meds  Problems  Med Hx  Surg Hx         Skin Cancer History  No skin cancer on file.      Specialty Problems          Dermatology Problems    Actinic keratosis    Melanocytic nevi of trunk    Melanocytic nevi of unspecified upper limb, including shoulder    Neoplasm of uncertain behavior of skin    Personal history of other malignant neoplasm of skin    Sebaceous cyst    Changing skin lesion    Rash        Objective   Well appearing patient in no apparent distress; mood and affect are within normal limits.    A focused skin examination was performed waist up. All findings within normal limits unless otherwise noted below.    Assessment/Plan   1. Actinic keratosis (15)  Mid Frontal Scalp (2), Mid Parietal Scalp (13)  Thin erythematous papules with gritty scale    WHAT IS ACTINIC KERATOSIS?   - Actinic keratosis (AK) is a skin condition caused by sun damage. It causes scaly, rough, or bumpy spots on the skin.  - If left alone, AKs may turn into a skin cancer. People who burn easily or have trouble tanning are at more risk for developing AKs.   - There is no one test for AKs and diagnosis is made by clinical appearance. Treatment options include cryotherapy, therapy with lights, and various creams (e.g., topical 5-fluorocuracil, imiquimod).       To lower the chance of getting AK, you can:       ?  Stay out of the sun in the middle of the day (from 10 a.m. to 4 p.m.)       ?  Wear sunscreen - An SPF of at least  30 is best. The SPF number is on the sunscreen bottle or tube.       ?  Wear a wide-brimmed hat, long-sleeved shirt, long pants, or long skirt outside. A baseball hat does not give much protection.        ?  Do not use tanning beds.        ?  Keep a low-fat diet, less than 21% of calories should come from fat       ?  Take Vitamin B3 (nicotinomide) 500mg twice daily.      YOUR TREATMENT PLAN  - At this time I recommend treatment with cryotherapy.  - Possible side effects of liquid nitrogen treatment reviewed including formation of blisters, crusting, tenderness, scar, and discoloration which may be permanent.  - Patient advised to return the office for re-evaluation if the treated lesion(s) do not resolve within 4-6 weeks. Patient verbalizes understanding.    Destr of lesion - Mid Frontal Scalp (2), Mid Parietal Scalp (13)  Complexity: simple    Destruction method: cryotherapy    Informed consent: discussed and consent obtained    Timeout:  patient name, date of birth, surgical site, and procedure verified  Lesion destroyed using liquid nitrogen: Yes    Cryotherapy cycles:  2  Outcome: patient tolerated procedure well with no complications    Post-procedure details: wound care instructions given      2. Hidradenitis suppurativa  waistline, gluteal cleft, axilla  Numerous erythematous papulonodules, some with pustule at the apex, to the axilla and waistline area. Waistline area with several connecting erythematous papulonodules and couple fistula. Scattered open comedomes noted to the axilla and groin area and some scarring. Some mild cribiform scarring to the waistline area. Johnson stage 2-3 2/2 to waistline area.     Hidradenitis suppurativa (HS) is a chronic condition of clogged sweat glands that leads to inflammation of the skin in areas such as the groin, underarms, underneath the breasts, and in between the buttocks. It most commonly appears as multiple large nodules (solid, raised bumps), abscesses (red,  swollen, warm, tender bumps or lumps with pus inside), and tunnels (holes in the skin that may contain fluid such as pus) in these areas. The nodules and abscesses gradually get larger and drain pus. After multiple bouts of this cycle of plugging, enlargement, and drainage, there may be tunnel formation under the skin and scarring. Pain is the most common symptom, but individuals with hidradenitis suppurativa also report itchy lesions, a foul odor coming from lesions when they drain pus, feeling tired, and joint pain. HS is chronic inflammatory condition and is not contagious and does not occur as a result of poor hygiene.      Successful treatment plans have multiple parts. We must decrease the inflammation in your skin to decrease flares (“Maintenance treatment”). We must also have a plan for treating flares since they might still occur despite good treatment.     SELF CARE GUIDELINES  Wear loose clothing to avoid friction with skin  Keep skin clean to reduce odor. Wash all open wounds daily in the shower with soap and water.   Smoking cessation: If you are a smoker, smoking cessation may possibly reduce the severity (frequency of flares, duration of flares, severity of flares) of HS. If you are  a smoker, I recommend working with your primary care provider to consider treatment options for nicotine dependence  If you have open wounds, apply a thin layer of white petroleum jelly (Vaseline) to the wounds before covering with dressings.   Weight loss can some also help with reducing the severity of HS.     TREATMENT OPTIONS    The following medications (risks, benefits, alteratives) were reviewed as possible treatment options:  Topical antibiotic cleansers (Benzoyl peroxide, Dial, Chlorhexidine)   Oral antibiotics (minocycline, doxycycline, clindamycin/rifampin)  Dapsone  Spironolactone  Oxybutynin  Oral retinoids (isotretinoin, acitretin)  Humira/Infliximab  Cosentyx  Surgery      PLAN  Minocycline 100 mg BID for 3  months  Encouraged smoking cessation  Encouraged hot compresses to affected areas to assist with drainage of lesions  Ok to continue with occasional bleach bathes. Given no open wounds at this time, ok to continue with swimming pool.     Minocycline  -Take with food and plenty of water.  -Do not take immediately before going to bed.   -If you take antacids, laxatives, calcium or iron supplements, try to avoid taking these within 2 hours of doxycyline.  -Avoid prolonged sun exposure.  -Do not take if you are trying to get pregnant, pregnant, or breast feeding.   -Birth control pills andother hormone-based birth control may not work as well to prevent pregnancy. Use some othernkind of birth control also like a condom when taking this drug.  -Common side effects: Nausea, vaginal yeast infections, diarrhea, increased sensitivity tosunlight and tanning beds, and chest pain (if pill does not reach the stomach, or if takenimmediately before going to bed).Rare side effects: Headaches and dizziness   - Also reviewed potential but rare side effects of drug induced lupus and hepatitis.       Related Procedures  Follow Up In Dermatology - Established Patient    Related Medications  minocycline (Dynacin) 100 mg tablet  Take  1 tablet twice daily. Take with a full glass of water and stay upright 60 minutes after taking. Avoid taking with dairy products.    3. Angioma of skin  Scattered cherry-red papule(s).    A cherry hemangioma is a small macule (small, flat, smooth area) or papule (small, solid bump) formed from an overgrowth of tiny blood vessels in the skin. Cherry hemangiomas are characteristically red or purplish in color. They often first appear in middle adulthood and usually increase in number with age. Cherry hemangiomas are noncancerous (benign) and are common in adults.    The present appearance of the lesion is not worrisome but it should continue to be observed and testing/treatment may be warranted if change  occurs.    4. Benign nevus  Scattered, uniform and benign-appearing, regular brown melanocytic papules and macules.    The present appearance of the lesion is not worrisome but it should continue to be observed and testing/treatment may be warranted if change occurs.    5. Seborrheic keratosis  Stuck on verrucous, tan-brown papules and plaques.      Seborrheic keratoses are common noncancerous (benign) growths of unknown cause seen in adults due to a thickening of an area of the top skin layer. Seborrheic keratoses may appear as if they are stuck on to the skin. They have distinct borders, and they may appear as papules (small, solid bumps) or plaques (solid, raised patches that are bigger than a thumbnail). They may be the same color as your skin, or they may be pink, light brown, darker brown, or very dark brown, or sometimes may appear black.    There is no way to prevent new seborrheic keratoses from forming. Seborrheic keratoses can be removed, but removal is considered a cosmetic issue and is usually not covered by insurance.    PLAN  No treatment is needed unless there is irritation from clothing, such as itching or bleeding.  2.   Some lotions containing alpha hydroxy acids, salicylic acid, or urea may make the areas feel smoother with regular use but will not eliminate them.    6. Lentigo  Scattered tan macules in sun-exposed areas.    A solar lentigo (plural, solar lentigines), also known as a sun-induced freckle or senile lentigo, is a dark (hyperpigmented) lesion caused by natural or artificial ultraviolet (UV) light. Solar lentigines may be single or multiple. This type of lentigo is different from a simple lentigo (lentigo simplex) because it is caused by exposure to UV light. Solar lentigines are benign, but they do indicate excessive sun exposure, a risk factor for the development of skin cancer.    To prevent solar lentigines, avoid exposure to sunlight in midday (10 AM to 3 PM), wear sun-protective  clothing (tightly woven clothes and hats), and apply sunscreen (SPF 30 UVA and UVB block).    The present appearance of the lesion is not worrisome but it should continue to be observed and testing/treatment may be warranted if change occurs.    7. Skin changes due to chronic exposure to nonionizing radiation  Actinic changes in the form of freckles, lentigines and hyper/hypopigmentation     ABCDEs of melanoma and atypical moles were discussed with the patient.    Patient was instructed to perform monthly self skin examination.  We recommended that the patient have regular full skin exams given an increased risk of subsequent skin cancers.    The patient was instructed to use sun protective behaviors including use of broad spectrum sunscreens and sun protective clothing to reduce risk of skin cancers.    Warning signs of non-melanoma skin cancer discussed.    8. Acne vulgaris  Torso - Posterior (Back)  Scattered cystic nodules.    This is a 53 y.o. male  with cystic acne and HS.     PLAN  Minocycline 100 mg BID for 3 months.                  9. Rash and other nonspecific skin eruption  Left Lower Eyelid, Right Lower Eyelid  Collection of soft pink papules    Patient endorses eye irritation and blurry vision. Patient denies itch. Patient advised to follow up with ophthalmology for evaluation given eye symptoms reported. Patient does have potential history of rosacea with flushing reported. If ocular rosacea, will improve with minocycline.         Return in 3 months for routine skin check or return to clinic sooner if needed

## 2024-07-30 NOTE — PATIENT INSTRUCTIONS

## 2024-07-31 ENCOUNTER — PHARMACY VISIT (OUTPATIENT)
Dept: PHARMACY | Facility: CLINIC | Age: 54
End: 2024-07-31
Payer: COMMERCIAL

## 2024-08-06 ENCOUNTER — APPOINTMENT (OUTPATIENT)
Dept: PRIMARY CARE | Facility: CLINIC | Age: 54
End: 2024-08-06
Payer: COMMERCIAL

## 2024-08-07 PROCEDURE — RXMED WILLOW AMBULATORY MEDICATION CHARGE

## 2024-08-08 ENCOUNTER — PHARMACY VISIT (OUTPATIENT)
Dept: PHARMACY | Facility: CLINIC | Age: 54
End: 2024-08-08
Payer: COMMERCIAL

## 2024-08-13 ENCOUNTER — APPOINTMENT (OUTPATIENT)
Dept: PRIMARY CARE | Facility: CLINIC | Age: 54
End: 2024-08-13
Payer: COMMERCIAL

## 2024-08-14 ENCOUNTER — APPOINTMENT (OUTPATIENT)
Dept: SURGERY | Facility: CLINIC | Age: 54
End: 2024-08-14
Payer: COMMERCIAL

## 2024-08-14 PROBLEM — D12.6 TUBULAR ADENOMA OF COLON: Status: ACTIVE | Noted: 2024-08-14

## 2024-08-26 DIAGNOSIS — F41.9 ANXIETY: ICD-10-CM

## 2024-08-26 RX ORDER — ESCITALOPRAM OXALATE 20 MG/1
30 TABLET ORAL DAILY
Qty: 135 TABLET | Refills: 1 | Status: SHIPPED | OUTPATIENT
Start: 2024-08-26

## 2024-09-03 ENCOUNTER — TELEPHONE (OUTPATIENT)
Dept: RHEUMATOLOGY | Facility: CLINIC | Age: 54
End: 2024-09-03
Payer: COMMERCIAL

## 2024-09-03 DIAGNOSIS — M05.9 SEROPOSITIVE RHEUMATOID ARTHRITIS (MULTI): Primary | ICD-10-CM

## 2024-09-03 DIAGNOSIS — Z79.899 ENCOUNTER FOR LONG-TERM (CURRENT) USE OF MEDICATIONS: ICD-10-CM

## 2024-09-04 NOTE — TELEPHONE ENCOUNTER
Called and spoke with patient and he confirmed he would like these placards mailed to him.  I told him that I would put them out in the mail today.

## 2024-09-06 PROCEDURE — RXMED WILLOW AMBULATORY MEDICATION CHARGE

## 2024-09-07 ENCOUNTER — HOSPITAL ENCOUNTER (OUTPATIENT)
Dept: RADIOLOGY | Facility: CLINIC | Age: 54
Discharge: HOME | End: 2024-09-07
Payer: COMMERCIAL

## 2024-09-07 ENCOUNTER — HOSPITAL ENCOUNTER (INPATIENT)
Facility: HOSPITAL | Age: 54
End: 2024-09-07
Attending: EMERGENCY MEDICINE | Admitting: INTERNAL MEDICINE
Payer: COMMERCIAL

## 2024-09-07 DIAGNOSIS — R52 PAIN: ICD-10-CM

## 2024-09-07 DIAGNOSIS — M20.12 HALLUX VALGUS OF LEFT FOOT: ICD-10-CM

## 2024-09-07 DIAGNOSIS — I73.9 PERIPHERAL VASCULAR DISEASE, UNSPECIFIED (CMS-HCC): ICD-10-CM

## 2024-09-07 DIAGNOSIS — I70.244 ATHEROSCLEROSIS OF NATIVE ARTERIES OF LEFT LEG WITH ULCERATION OF HEEL AND MIDFOOT (MULTI): ICD-10-CM

## 2024-09-07 DIAGNOSIS — M00.9: Primary | ICD-10-CM

## 2024-09-07 DIAGNOSIS — M79.672 FOOT PAIN, LEFT: ICD-10-CM

## 2024-09-07 PROBLEM — F10.20 UNCOMPLICATED ALCOHOL DEPENDENCE (MULTI): Status: ACTIVE | Noted: 2024-09-07

## 2024-09-07 LAB
ALBUMIN SERPL BCP-MCNC: 3.6 G/DL (ref 3.4–5)
ALP SERPL-CCNC: 85 U/L (ref 33–120)
ALT SERPL W P-5'-P-CCNC: 22 U/L (ref 10–52)
ANION GAP SERPL CALC-SCNC: 11 MMOL/L (ref 10–20)
AST SERPL W P-5'-P-CCNC: 38 U/L (ref 9–39)
BASOPHILS # BLD AUTO: 0.07 X10*3/UL (ref 0–0.1)
BASOPHILS NFR BLD AUTO: 0.6 %
BILIRUB SERPL-MCNC: 0.5 MG/DL (ref 0–1.2)
BUN SERPL-MCNC: 14 MG/DL (ref 6–23)
CALCIUM SERPL-MCNC: 8.9 MG/DL (ref 8.6–10.3)
CHLORIDE SERPL-SCNC: 103 MMOL/L (ref 98–107)
CO2 SERPL-SCNC: 28 MMOL/L (ref 21–32)
CREAT SERPL-MCNC: 0.87 MG/DL (ref 0.5–1.3)
CRP SERPL-MCNC: 3.75 MG/DL
EGFRCR SERPLBLD CKD-EPI 2021: >90 ML/MIN/1.73M*2
EOSINOPHIL # BLD AUTO: 0.21 X10*3/UL (ref 0–0.7)
EOSINOPHIL NFR BLD AUTO: 1.7 %
ERYTHROCYTE [DISTWIDTH] IN BLOOD BY AUTOMATED COUNT: 13.6 % (ref 11.5–14.5)
ERYTHROCYTE [SEDIMENTATION RATE] IN BLOOD BY WESTERGREN METHOD: 48 MM/H (ref 0–20)
GLUCOSE SERPL-MCNC: 80 MG/DL (ref 74–99)
HCT VFR BLD AUTO: 38.7 % (ref 41–52)
HGB BLD-MCNC: 12.8 G/DL (ref 13.5–17.5)
IMM GRANULOCYTES # BLD AUTO: 0.07 X10*3/UL (ref 0–0.7)
IMM GRANULOCYTES NFR BLD AUTO: 0.6 % (ref 0–0.9)
LACTATE SERPL-SCNC: 1.4 MMOL/L (ref 0.4–2)
LYMPHOCYTES # BLD AUTO: 2.02 X10*3/UL (ref 1.2–4.8)
LYMPHOCYTES NFR BLD AUTO: 16.3 %
MCH RBC QN AUTO: 28.8 PG (ref 26–34)
MCHC RBC AUTO-ENTMCNC: 33.1 G/DL (ref 32–36)
MCV RBC AUTO: 87 FL (ref 80–100)
MONOCYTES # BLD AUTO: 1.2 X10*3/UL (ref 0.1–1)
MONOCYTES NFR BLD AUTO: 9.7 %
NEUTROPHILS # BLD AUTO: 8.82 X10*3/UL (ref 1.2–7.7)
NEUTROPHILS NFR BLD AUTO: 71.1 %
NRBC BLD-RTO: 0 /100 WBCS (ref 0–0)
PLATELET # BLD AUTO: 393 X10*3/UL (ref 150–450)
POTASSIUM SERPL-SCNC: 4.7 MMOL/L (ref 3.5–5.3)
PROT SERPL-MCNC: 7.3 G/DL (ref 6.4–8.2)
RBC # BLD AUTO: 4.44 X10*6/UL (ref 4.5–5.9)
SODIUM SERPL-SCNC: 137 MMOL/L (ref 136–145)
WBC # BLD AUTO: 12.4 X10*3/UL (ref 4.4–11.3)

## 2024-09-07 PROCEDURE — G0378 HOSPITAL OBSERVATION PER HR: HCPCS

## 2024-09-07 PROCEDURE — 99223 1ST HOSP IP/OBS HIGH 75: CPT

## 2024-09-07 PROCEDURE — 99285 EMERGENCY DEPT VISIT HI MDM: CPT

## 2024-09-07 PROCEDURE — 96372 THER/PROPH/DIAG INJ SC/IM: CPT | Performed by: NURSE PRACTITIONER

## 2024-09-07 PROCEDURE — 80053 COMPREHEN METABOLIC PANEL: CPT

## 2024-09-07 PROCEDURE — 94760 N-INVAS EAR/PLS OXIMETRY 1: CPT

## 2024-09-07 PROCEDURE — 85652 RBC SED RATE AUTOMATED: CPT

## 2024-09-07 PROCEDURE — 73630 X-RAY EXAM OF FOOT: CPT | Mod: LT

## 2024-09-07 PROCEDURE — 2500000001 HC RX 250 WO HCPCS SELF ADMINISTERED DRUGS (ALT 637 FOR MEDICARE OP)

## 2024-09-07 PROCEDURE — 36415 COLL VENOUS BLD VENIPUNCTURE: CPT

## 2024-09-07 PROCEDURE — 2500000004 HC RX 250 GENERAL PHARMACY W/ HCPCS (ALT 636 FOR OP/ED)

## 2024-09-07 PROCEDURE — 73630 X-RAY EXAM OF FOOT: CPT | Mod: LEFT SIDE | Performed by: RADIOLOGY

## 2024-09-07 PROCEDURE — 83605 ASSAY OF LACTIC ACID: CPT

## 2024-09-07 PROCEDURE — 86140 C-REACTIVE PROTEIN: CPT

## 2024-09-07 PROCEDURE — 85025 COMPLETE CBC W/AUTO DIFF WBC: CPT

## 2024-09-07 PROCEDURE — 2500000004 HC RX 250 GENERAL PHARMACY W/ HCPCS (ALT 636 FOR OP/ED): Performed by: NURSE PRACTITIONER

## 2024-09-07 RX ORDER — ONDANSETRON HYDROCHLORIDE 2 MG/ML
4 INJECTION, SOLUTION INTRAVENOUS EVERY 8 HOURS PRN
Status: ACTIVE | OUTPATIENT
Start: 2024-09-07

## 2024-09-07 RX ORDER — ACETAMINOPHEN 500 MG
5 TABLET ORAL NIGHTLY PRN
Status: ACTIVE | OUTPATIENT
Start: 2024-09-07

## 2024-09-07 RX ORDER — ESCITALOPRAM OXALATE 10 MG/1
30 TABLET ORAL DAILY
Status: DISPENSED | OUTPATIENT
Start: 2024-09-08

## 2024-09-07 RX ORDER — CYANOCOBALAMIN 1000 UG/ML
1000 INJECTION, SOLUTION INTRAMUSCULAR; SUBCUTANEOUS
Status: DISPENSED | OUTPATIENT
Start: 2024-09-08

## 2024-09-07 RX ORDER — LANOLIN ALCOHOL/MO/W.PET/CERES
100 CREAM (GRAM) TOPICAL DAILY
Status: DISPENSED | OUTPATIENT
Start: 2024-09-08

## 2024-09-07 RX ORDER — BISMUTH SUBSALICYLATE 262 MG
1 TABLET,CHEWABLE ORAL DAILY
Status: DISPENSED | OUTPATIENT
Start: 2024-09-08

## 2024-09-07 RX ORDER — VANCOMYCIN 1.5 G/300ML
1500 INJECTION, SOLUTION INTRAVENOUS EVERY 12 HOURS
Status: DISCONTINUED | OUTPATIENT
Start: 2024-09-07 | End: 2024-09-08

## 2024-09-07 RX ORDER — GUAIFENESIN/DEXTROMETHORPHAN 100-10MG/5
5 SYRUP ORAL EVERY 4 HOURS PRN
Status: ACTIVE | OUTPATIENT
Start: 2024-09-07

## 2024-09-07 RX ORDER — DIAZEPAM 5 MG/1
5 TABLET ORAL EVERY 2 HOUR PRN
Status: ACTIVE | OUTPATIENT
Start: 2024-09-07

## 2024-09-07 RX ORDER — LEFLUNOMIDE 10 MG/1
20 TABLET ORAL DAILY
Status: DISPENSED | OUTPATIENT
Start: 2024-09-08

## 2024-09-07 RX ORDER — CEFTRIAXONE 2 G/50ML
2 INJECTION, SOLUTION INTRAVENOUS ONCE
Status: COMPLETED | OUTPATIENT
Start: 2024-09-07 | End: 2024-09-07

## 2024-09-07 RX ORDER — LISINOPRIL 10 MG/1
10 TABLET ORAL 2 TIMES DAILY
Status: DISPENSED | OUTPATIENT
Start: 2024-09-07

## 2024-09-07 RX ORDER — KETOROLAC TROMETHAMINE 30 MG/ML
30 INJECTION, SOLUTION INTRAMUSCULAR; INTRAVENOUS ONCE
Status: COMPLETED | OUTPATIENT
Start: 2024-09-07 | End: 2024-09-07

## 2024-09-07 RX ORDER — ONDANSETRON 4 MG/1
4 TABLET, FILM COATED ORAL EVERY 8 HOURS PRN
Status: ACTIVE | OUTPATIENT
Start: 2024-09-07

## 2024-09-07 RX ORDER — PANTOPRAZOLE SODIUM 40 MG/1
40 TABLET, DELAYED RELEASE ORAL
Status: ACTIVE | OUTPATIENT
Start: 2024-09-08

## 2024-09-07 RX ORDER — ACETAMINOPHEN 325 MG/1
650 TABLET ORAL EVERY 4 HOURS PRN
Status: ACTIVE | OUTPATIENT
Start: 2024-09-07

## 2024-09-07 RX ORDER — CALCIUM CARBONATE 200(500)MG
500 TABLET,CHEWABLE ORAL 4 TIMES DAILY PRN
Status: ACTIVE | OUTPATIENT
Start: 2024-09-07

## 2024-09-07 RX ORDER — ENOXAPARIN SODIUM 100 MG/ML
40 INJECTION SUBCUTANEOUS EVERY 24 HOURS
Status: DISPENSED | OUTPATIENT
Start: 2024-09-07

## 2024-09-07 RX ORDER — PANTOPRAZOLE SODIUM 40 MG/10ML
40 INJECTION, POWDER, LYOPHILIZED, FOR SOLUTION INTRAVENOUS
Status: ACTIVE | OUTPATIENT
Start: 2024-09-08

## 2024-09-07 RX ORDER — FERROUS SULFATE 325(65) MG
1 TABLET ORAL
Status: DISPENSED | OUTPATIENT
Start: 2024-09-08

## 2024-09-07 RX ORDER — CEFTRIAXONE 2 G/50ML
INJECTION, SOLUTION INTRAVENOUS
Status: COMPLETED
Start: 2024-09-07 | End: 2024-09-07

## 2024-09-07 RX ORDER — AMOXICILLIN 250 MG
1 CAPSULE ORAL 2 TIMES DAILY
Status: DISPENSED | OUTPATIENT
Start: 2024-09-07

## 2024-09-07 RX ORDER — GUAIFENESIN 600 MG/1
600 TABLET, EXTENDED RELEASE ORAL EVERY 12 HOURS PRN
Status: ACTIVE | OUTPATIENT
Start: 2024-09-07

## 2024-09-07 RX ORDER — TRAMADOL HYDROCHLORIDE 50 MG/1
50 TABLET ORAL EVERY 6 HOURS PRN
Status: DISPENSED | OUTPATIENT
Start: 2024-09-07

## 2024-09-07 RX ORDER — METOPROLOL TARTRATE 50 MG/1
50 TABLET ORAL 2 TIMES DAILY
Status: DISPENSED | OUTPATIENT
Start: 2024-09-07

## 2024-09-07 RX ORDER — ATORVASTATIN CALCIUM 40 MG/1
40 TABLET, FILM COATED ORAL DAILY
Status: DISPENSED | OUTPATIENT
Start: 2024-09-08

## 2024-09-07 RX ORDER — CHOLECALCIFEROL (VITAMIN D3) 25 MCG
2000 TABLET ORAL DAILY
Status: DISPENSED | OUTPATIENT
Start: 2024-09-08

## 2024-09-07 RX ORDER — CEFTRIAXONE 1 G/50ML
1 INJECTION, SOLUTION INTRAVENOUS EVERY 24 HOURS
Status: DISPENSED | OUTPATIENT
Start: 2024-09-08

## 2024-09-07 RX ORDER — FOLIC ACID 1 MG/1
1 TABLET ORAL DAILY
Status: DISPENSED | OUTPATIENT
Start: 2024-09-08

## 2024-09-07 RX ORDER — VANCOMYCIN HYDROCHLORIDE 1 G/20ML
INJECTION, POWDER, LYOPHILIZED, FOR SOLUTION INTRAVENOUS DAILY PRN
Status: DISPENSED | OUTPATIENT
Start: 2024-09-07

## 2024-09-07 SDOH — SOCIAL STABILITY: SOCIAL INSECURITY: ARE THERE ANY APPARENT SIGNS OF INJURIES/BEHAVIORS THAT COULD BE RELATED TO ABUSE/NEGLECT?: NO

## 2024-09-07 SDOH — ECONOMIC STABILITY: HOUSING INSECURITY: AT ANY TIME IN THE PAST 12 MONTHS, WERE YOU HOMELESS OR LIVING IN A SHELTER (INCLUDING NOW)?: NO

## 2024-09-07 SDOH — ECONOMIC STABILITY: TRANSPORTATION INSECURITY
IN THE PAST 12 MONTHS, HAS THE LACK OF TRANSPORTATION KEPT YOU FROM MEDICAL APPOINTMENTS OR FROM GETTING MEDICATIONS?: NO

## 2024-09-07 SDOH — SOCIAL STABILITY: SOCIAL INSECURITY: DO YOU FEEL ANYONE HAS EXPLOITED OR TAKEN ADVANTAGE OF YOU FINANCIALLY OR OF YOUR PERSONAL PROPERTY?: NO

## 2024-09-07 SDOH — SOCIAL STABILITY: SOCIAL INSECURITY: DOES ANYONE TRY TO KEEP YOU FROM HAVING/CONTACTING OTHER FRIENDS OR DOING THINGS OUTSIDE YOUR HOME?: NO

## 2024-09-07 SDOH — ECONOMIC STABILITY: INCOME INSECURITY: IN THE LAST 12 MONTHS, WAS THERE A TIME WHEN YOU WERE NOT ABLE TO PAY THE MORTGAGE OR RENT ON TIME?: NO

## 2024-09-07 SDOH — SOCIAL STABILITY: SOCIAL INSECURITY: HAVE YOU HAD THOUGHTS OF HARMING ANYONE ELSE?: NO

## 2024-09-07 SDOH — SOCIAL STABILITY: SOCIAL INSECURITY: ABUSE: ADULT

## 2024-09-07 SDOH — ECONOMIC STABILITY: HOUSING INSECURITY: IN THE PAST 12 MONTHS, HOW MANY TIMES HAVE YOU MOVED WHERE YOU WERE LIVING?: 0

## 2024-09-07 SDOH — SOCIAL STABILITY: SOCIAL INSECURITY: HAVE YOU HAD ANY THOUGHTS OF HARMING ANYONE ELSE?: NO

## 2024-09-07 SDOH — SOCIAL STABILITY: SOCIAL INSECURITY: ARE YOU OR HAVE YOU BEEN THREATENED OR ABUSED PHYSICALLY, EMOTIONALLY, OR SEXUALLY BY ANYONE?: NO

## 2024-09-07 SDOH — ECONOMIC STABILITY: TRANSPORTATION INSECURITY
IN THE PAST 12 MONTHS, HAS LACK OF TRANSPORTATION KEPT YOU FROM MEETINGS, WORK, OR FROM GETTING THINGS NEEDED FOR DAILY LIVING?: NO

## 2024-09-07 SDOH — SOCIAL STABILITY: SOCIAL INSECURITY: HAS ANYONE EVER THREATENED TO HURT YOUR FAMILY OR YOUR PETS?: NO

## 2024-09-07 SDOH — ECONOMIC STABILITY: INCOME INSECURITY: HOW HARD IS IT FOR YOU TO PAY FOR THE VERY BASICS LIKE FOOD, HOUSING, MEDICAL CARE, AND HEATING?: NOT HARD AT ALL

## 2024-09-07 SDOH — SOCIAL STABILITY: SOCIAL INSECURITY: DO YOU FEEL UNSAFE GOING BACK TO THE PLACE WHERE YOU ARE LIVING?: NO

## 2024-09-07 ASSESSMENT — ACTIVITIES OF DAILY LIVING (ADL)
FEEDING YOURSELF: INDEPENDENT
GROOMING: INDEPENDENT
PATIENT'S MEMORY ADEQUATE TO SAFELY COMPLETE DAILY ACTIVITIES?: YES
DRESSING YOURSELF: INDEPENDENT
HEARING - RIGHT EAR: FUNCTIONAL
JUDGMENT_ADEQUATE_SAFELY_COMPLETE_DAILY_ACTIVITIES: YES
WALKS IN HOME: INDEPENDENT
BATHING: INDEPENDENT
HEARING - LEFT EAR: FUNCTIONAL
TOILETING: INDEPENDENT
ADEQUATE_TO_COMPLETE_ADL: YES
LACK_OF_TRANSPORTATION: NO

## 2024-09-07 ASSESSMENT — ENCOUNTER SYMPTOMS
WOUND: 1
RESPIRATORY NEGATIVE: 1
HEMATOLOGIC/LYMPHATIC NEGATIVE: 1
EYES NEGATIVE: 1
CARDIOVASCULAR NEGATIVE: 1
ALLERGIC/IMMUNOLOGIC NEGATIVE: 1
PSYCHIATRIC NEGATIVE: 1
GASTROINTESTINAL NEGATIVE: 1
NUMBNESS: 1
ENDOCRINE NEGATIVE: 1
CONSTITUTIONAL NEGATIVE: 1

## 2024-09-07 ASSESSMENT — LIFESTYLE VARIABLES
VISUAL DISTURBANCES: NOT PRESENT
AGITATION: NORMAL ACTIVITY
HOW OFTEN DO YOU HAVE 6 OR MORE DRINKS ON ONE OCCASION: WEEKLY
ANXIETY: NO ANXIETY, AT EASE
PAROXYSMAL SWEATS: NO SWEAT VISIBLE
PAROXYSMAL SWEATS: NO SWEAT VISIBLE
SKIP TO QUESTIONS 9-10: 0
HOW OFTEN DO YOU HAVE A DRINK CONTAINING ALCOHOL: 4 OR MORE TIMES A WEEK
AGITATION: NORMAL ACTIVITY
TREMOR: NO TREMOR
HOW MANY STANDARD DRINKS CONTAINING ALCOHOL DO YOU HAVE ON A TYPICAL DAY: 1 OR 2
NAUSEA AND VOMITING: NO NAUSEA AND NO VOMITING
ORIENTATION AND CLOUDING OF SENSORIUM: ORIENTED AND CAN DO SERIAL ADDITIONS
AUDITORY DISTURBANCES: NOT PRESENT
AGITATION: NORMAL ACTIVITY
AUDITORY DISTURBANCES: NOT PRESENT
VISUAL DISTURBANCES: NOT PRESENT
TREMOR: NO TREMOR
PAROXYSMAL SWEATS: NO SWEAT VISIBLE
TOTAL SCORE: 0
TREMOR: NO TREMOR
ORIENTATION AND CLOUDING OF SENSORIUM: ORIENTED AND CAN DO SERIAL ADDITIONS
HEADACHE, FULLNESS IN HEAD: NOT PRESENT
ANXIETY: NO ANXIETY, AT EASE
HEADACHE, FULLNESS IN HEAD: NOT PRESENT
NAUSEA AND VOMITING: NO NAUSEA AND NO VOMITING
HEADACHE, FULLNESS IN HEAD: NOT PRESENT
TOTAL SCORE: 0
TOTAL SCORE: 0
VISUAL DISTURBANCES: NOT PRESENT
AUDIT-C TOTAL SCORE: 7
NAUSEA AND VOMITING: NO NAUSEA AND NO VOMITING
ANXIETY: NO ANXIETY, AT EASE
AUDITORY DISTURBANCES: NOT PRESENT
AUDIT-C TOTAL SCORE: 7
ORIENTATION AND CLOUDING OF SENSORIUM: ORIENTED AND CAN DO SERIAL ADDITIONS

## 2024-09-07 ASSESSMENT — COGNITIVE AND FUNCTIONAL STATUS - GENERAL
DAILY ACTIVITIY SCORE: 24
PATIENT BASELINE BEDBOUND: NO
MOBILITY SCORE: 24
DAILY ACTIVITIY SCORE: 24
MOBILITY SCORE: 24

## 2024-09-07 ASSESSMENT — PAIN - FUNCTIONAL ASSESSMENT
PAIN_FUNCTIONAL_ASSESSMENT: 0-10
PAIN_FUNCTIONAL_ASSESSMENT: 0-10

## 2024-09-07 ASSESSMENT — PAIN SCALES - GENERAL
PAINLEVEL_OUTOF10: 6
PAINLEVEL_OUTOF10: 4
PAINLEVEL_OUTOF10: 5 - MODERATE PAIN

## 2024-09-07 ASSESSMENT — PATIENT HEALTH QUESTIONNAIRE - PHQ9
2. FEELING DOWN, DEPRESSED OR HOPELESS: NOT AT ALL
1. LITTLE INTEREST OR PLEASURE IN DOING THINGS: NOT AT ALL
SUM OF ALL RESPONSES TO PHQ9 QUESTIONS 1 & 2: 0

## 2024-09-07 ASSESSMENT — COLUMBIA-SUICIDE SEVERITY RATING SCALE - C-SSRS
6. HAVE YOU EVER DONE ANYTHING, STARTED TO DO ANYTHING, OR PREPARED TO DO ANYTHING TO END YOUR LIFE?: NO
1. IN THE PAST MONTH, HAVE YOU WISHED YOU WERE DEAD OR WISHED YOU COULD GO TO SLEEP AND NOT WAKE UP?: NO
2. HAVE YOU ACTUALLY HAD ANY THOUGHTS OF KILLING YOURSELF?: NO

## 2024-09-07 ASSESSMENT — PAIN DESCRIPTION - LOCATION
LOCATION: FOOT
LOCATION: FOOT

## 2024-09-07 ASSESSMENT — PAIN DESCRIPTION - FREQUENCY: FREQUENCY: CONSTANT/CONTINUOUS

## 2024-09-07 ASSESSMENT — PAIN DESCRIPTION - PAIN TYPE: TYPE: ACUTE PAIN

## 2024-09-07 ASSESSMENT — PAIN DESCRIPTION - ORIENTATION
ORIENTATION: LEFT
ORIENTATION: LEFT

## 2024-09-07 NOTE — ED TRIAGE NOTES
States about 2 weeks ago his left foot and ankle started swelling then a callus opened up around the ball of the foot and at first was leaking clear fluid but now it is yellow. Went to urgent care and was sent here.

## 2024-09-07 NOTE — CONSULTS
Vancomycin Dosing by Pharmacy- INITIAL    Domo Arcos is a 53 y.o. year old male who Pharmacy has been consulted for vancomycin dosing for osteomyelitis/septic arthritis. Based on the patient's indication and renal status this patient will be dosed based on a goal AUC of 500-600.     Renal function is currently stable.    Visit Vitals  BP (!) 135/94 (BP Location: Left arm, Patient Position: Sitting)   Pulse 78   Temp 36.4 °C (97.5 °F) (Temporal)   Resp 16        Lab Results   Component Value Date    CREATININE 0.87 2024    CREATININE 0.86 2024    CREATININE 1.13 10/04/2023    CREATININE 0.93 2023    CREATININE 0.92 2023    CREATININE 0.88 2023    CREATININE 0.95 2022        Patient weight is as follows:   Vitals:    24 1347   Weight: 108 kg (238 lb)       Cultures:  No results found for the encounter in last 14 days.        No intake/output data recorded.  I/O during current shift:  No intake/output data recorded.    Temp (24hrs), Av.4 °C (97.5 °F), Min:36.4 °C (97.5 °F), Max:36.4 °C (97.5 °F)         Assessment/Plan     Patient will not be given a loading dose.  Will initiate vancomycin maintenance,  1500 mg every 12 hours.    This dosing regimen is predicted by [employer] to result in the following pharmacokinetic parameters:  Loading dose: N/A  Regimen: 1500 mg IV every 12 hours.  Start time: 16:07 on 2024  Exposure target: AUC24 (range)400-600 mg/L.hr   AUC24,ss: 545 mg/L.hr  Probability of AUC24 > 400: 81 %  Ctrough,ss: 17 mg/L  Probability of Ctrough,ss > 20: 36 %  Probability of nephrotoxicity (Lodise BRENDA ): 13 %      Follow-up level will be ordered on  at 0500 unless clinically indicated sooner.  Will continue to monitor renal function daily while on vancomycin and order serum creatinine at least every 48 hours if not already ordered.  Follow for continued vancomycin needs, clinical response, and signs/symptoms of toxicity.       Georgia Ellsworth,  PharmD

## 2024-09-07 NOTE — H&P
History Of Present Illness  Domo Arcos is a 53 y.o. male presenting with left foot pain x 2 weeks. Patient denies any injury to the area. Patient is able to bear weight but it is painful. Patient states it is the bottom of his foot that is painful. Patient states it felt almost like a rheumatoid arthritis flare. Patient denies any fever chills, numbness, tingling, chest pain, shortness of breath, URI symptoms, nausea, vomiting, abdominal pain, diarrhea, constipation. Patient has not had any pain medication for symptoms. Patient smokes less than a pack per day. Patient drinks a couple beers daily. Patient denies any street drug abuse. Patient is noted to have pin point open area draining yellow, clear liquid.  Patient states this area was not previously open.  Discussed treatment plan, states understanding, and agrees.       Past Medical History  Past Medical History:   Diagnosis Date    Anemia     Arthritis     Depression     Hypertension     Personal history of other diseases of the musculoskeletal system and connective tissue     History of backache    Radiculopathy, cervical region 02/14/2022    Cervical radiculopathy, acute    Rash and other nonspecific skin eruption 12/07/2022    Rash       Surgical History  Past Surgical History:   Procedure Laterality Date    APPENDECTOMY  03/01/2017    Appendectomy    OTHER SURGICAL HISTORY Right 01/13/2019    Elbow surgery    SPINE SURGERY      Cyst removed from spine        Social History  He reports that he has been smoking cigarettes. He has a 1 pack-year smoking history. He has never used smokeless tobacco. He reports current alcohol use. He reports that he does not use drugs.    Family History  Family History   Problem Relation Name Age of Onset    Rashes / Skin problems Brother          Allergies  Patient has no known allergies.    Review of Systems   Constitutional: Negative.    HENT: Negative.     Eyes: Negative.    Respiratory: Negative.     Cardiovascular:  "Negative.    Gastrointestinal: Negative.    Endocrine: Negative.    Genitourinary: Negative.    Musculoskeletal:  Positive for gait problem.   Skin:  Positive for wound.   Allergic/Immunologic: Negative.    Neurological:  Positive for numbness.   Hematological: Negative.    Psychiatric/Behavioral: Negative.          Physical Exam  Constitutional:       Appearance: He is obese.   HENT:      Head: Normocephalic and atraumatic.      Nose: Nose normal.      Mouth/Throat:      Mouth: Mucous membranes are moist.   Eyes:      Extraocular Movements: Extraocular movements intact.      Pupils: Pupils are equal, round, and reactive to light.   Cardiovascular:      Rate and Rhythm: Normal rate and regular rhythm.      Pulses: Normal pulses.      Heart sounds: Normal heart sounds.   Pulmonary:      Effort: Pulmonary effort is normal.      Breath sounds: Normal breath sounds.   Abdominal:      General: Bowel sounds are normal.      Palpations: Abdomen is soft.   Musculoskeletal:         General: Tenderness present. Normal range of motion.      Cervical back: Normal range of motion.      Comments: Left Foot   Skin:     General: Skin is warm and dry.      Capillary Refill: Capillary refill takes less than 2 seconds.      Comments: Open area, left plantar   Neurological:      Mental Status: He is alert and oriented to person, place, and time. Mental status is at baseline.      Gait: Gait abnormal.   Psychiatric:         Mood and Affect: Mood normal.         Behavior: Behavior normal.          Last Recorded Vitals  Blood pressure 140/85, pulse 70, temperature 36.4 °C (97.5 °F), temperature source Temporal, resp. rate 16, height 1.676 m (5' 6\"), weight 107 kg (235 lb 7.2 oz), SpO2 94%.    Relevant Results  Scheduled medications  [START ON 9/8/2024] atorvastatin, 40 mg, oral, Daily  [START ON 9/8/2024] cefTRIAXone, 1 g, intravenous, q24h  [START ON 9/8/2024] cholecalciferol, 2,000 Units, oral, Daily  [START ON 9/8/2024] cyanocobalamin, " 1,000 mcg, intramuscular, q30 days  enoxaparin, 40 mg, subcutaneous, q24h  [START ON 9/8/2024] escitalopram, 30 mg, oral, Daily  [START ON 9/8/2024] ferrous sulfate (325 mg ferrous sulfate), 1 tablet, oral, Daily with breakfast  [START ON 9/8/2024] folic acid, 1 mg, oral, Daily  [START ON 9/8/2024] leflunomide, 20 mg, oral, Daily  lisinopril, 10 mg, oral, BID  metoprolol tartrate, 50 mg, oral, BID  [START ON 9/8/2024] multivitamin, 1 tablet, oral, Daily  [START ON 9/8/2024] pantoprazole, 40 mg, oral, Daily before breakfast   Or  [START ON 9/8/2024] pantoprazole, 40 mg, intravenous, Daily before breakfast  sennosides-docusate sodium, 1 tablet, oral, BID  [START ON 9/8/2024] thiamine, 100 mg, oral, Daily  [START ON 9/8/2024] tofacitinib ER, 11 mg, oral, Daily  vancomycin, 1,500 mg, intravenous, q12h      Continuous medications     PRN medications  PRN medications: acetaminophen, acetaminophen, benzocaine-menthol, calcium carbonate, dextromethorphan-guaifenesin, diazePAM, guaiFENesin, melatonin, ondansetron **OR** ondansetron, traMADol, vancomycin    XR foot left 3+ views    Result Date: 9/7/2024  Interpreted By:  Wali Bustamante, STUDY: XR FOOT LEFT 3+ VIEWS; ;  9/7/2024 1:12 pm   INDICATION: Signs/Symptoms:swelling.   COMPARISON: None.   ACCESSION NUMBER(S): [id number]   ORDERING CLINICIAN: HOA GRAY   TECHNIQUE: 3 radiographs of the left foot are performed.   FINDINGS: There is severe hallux valgus deformity. There is lateral subluxation and angulation of the 2nd and 3rd proximal phalanx and dorsum medial subluxation and angulation of the 5th proximal phalanx. There is suspected bone destruction at the 2nd metatarsal head with lucency extending into the proximal diaphysis. There is subtle subcortical lucency with cortical thinning and irregularity at the base of the 2nd proximal phalanx medially. There is multifocal osteoarthritic changes at the 1st MTP joint and multiple IP joints. The remaining  osseous structures are intact.   There is diffuse soft tissue swelling of the forefoot which is more pronounced at the MTP joints.       Multifocal subluxations of the MTP joints as detailed above with severe hallux valgus deformity.   Superimposed subarticular bone destruction at the 2nd MTP joint suspicious for septic arthritis in the appropriate clinical setting.   Diffuse soft tissue swelling of the forefoot.     MACRO: None   Signed by: Wali Bustamante 9/7/2024 1:22 PM Dictation workstation:   [id number]     Results for orders placed or performed during the hospital encounter of 09/07/24 (from the past 24 hour(s))   CBC and Auto Differential   Result Value Ref Range    WBC 12.4 (H) 4.4 - 11.3 x10*3/uL    nRBC 0.0 0.0 - 0.0 /100 WBCs    RBC 4.44 (L) 4.50 - 5.90 x10*6/uL    Hemoglobin 12.8 (L) 13.5 - 17.5 g/dL    Hematocrit 38.7 (L) 41.0 - 52.0 %    MCV 87 80 - 100 fL    MCH 28.8 26.0 - 34.0 pg    MCHC 33.1 32.0 - 36.0 g/dL    RDW 13.6 11.5 - 14.5 %    Platelets 393 150 - 450 x10*3/uL    Neutrophils % 71.1 40.0 - 80.0 %    Immature Granulocytes %, Automated 0.6 0.0 - 0.9 %    Lymphocytes % 16.3 13.0 - 44.0 %    Monocytes % 9.7 2.0 - 10.0 %    Eosinophils % 1.7 0.0 - 6.0 %    Basophils % 0.6 0.0 - 2.0 %    Neutrophils Absolute 8.82 (H) 1.20 - 7.70 x10*3/uL    Immature Granulocytes Absolute, Automated 0.07 0.00 - 0.70 x10*3/uL    Lymphocytes Absolute 2.02 1.20 - 4.80 x10*3/uL    Monocytes Absolute 1.20 (H) 0.10 - 1.00 x10*3/uL    Eosinophils Absolute 0.21 0.00 - 0.70 x10*3/uL    Basophils Absolute 0.07 0.00 - 0.10 x10*3/uL   Comprehensive metabolic panel   Result Value Ref Range    Glucose 80 74 - 99 mg/dL    Sodium 137 136 - 145 mmol/L    Potassium 4.7 3.5 - 5.3 mmol/L    Chloride 103 98 - 107 mmol/L    Bicarbonate 28 21 - 32 mmol/L    Anion Gap 11 10 - 20 mmol/L    Urea Nitrogen 14 6 - 23 mg/dL    Creatinine 0.87 0.50 - 1.30 mg/dL    eGFR >90 >60 mL/min/1.73m*2    Calcium 8.9 8.6 - 10.3 mg/dL    Albumin 3.6  3.4 - 5.0 g/dL    Alkaline Phosphatase 85 33 - 120 U/L    Total Protein 7.3 6.4 - 8.2 g/dL    AST 38 9 - 39 U/L    Bilirubin, Total 0.5 0.0 - 1.2 mg/dL    ALT 22 10 - 52 U/L   C-reactive protein   Result Value Ref Range    C-Reactive Protein 3.75 (H) <1.00 mg/dL   Sedimentation Rate   Result Value Ref Range    Sedimentation Rate 48 (H) 0 - 20 mm/h   Lactate   Result Value Ref Range    Lactate 1.4 0.4 - 2.0 mmol/L        Assessment/Plan   Assessment & Plan  Septic arthritis (Multi)    Anemia    Rheumatoid arthritis (Multi)    Depression    Hypertension    Vitamin D deficiency    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  Superimposed subarticular bone destruction at the 2nd MTP joint  suspicious for septic arthritis in the appropriate clinical setting.  Diffuse soft tissue swelling of the forefoot.  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)  -continue Arava 20 mg PO Daily   -continue Xeljanz 11 mg PO Daily     #Depression  -continue Lexapro 30 mg PO Daily     #Hypertension  -continue Lisinopril 10 mg PO BID  -continue Lopressor 50 mg PO BID    #Vitamin D deficiency  -continue Vitamin D 2,000 units PO Daily     #Uncomplicated alcohol dependence (Multi)  -[facility] Protocol   -Folic Acid 1 mg PO Daily  -Multivitamin Daily   -Vitamin B-1 100 mg PO Daily     #Hyperlipidemia  -continue Lipitor 40 mg PO Daily     DVT Prophylaxis: Lovenox   Fluids: N/A  Nutrition: Cardiac     Code Status: Full Code    Pt requires inpatient  stay at this time.  Total accumulated time spent face to face and not face to face preparing to see the patient, obtaining and reviewing separately obtained history; performing a medically appropriate examination and/or evaluation; counseling and educating the patient, family; ordering medications, tests, or procedures; referring and communicating with other health care professionals; documenting clinical information in the patient's medical record; independently interpreting results and communicating the results to the patient, family; and care coordination was 45 minutes.      REZA Perry  Attending Attestation:    I was present with the KRISTINE-CNP who participated in the documentation of this note. I have personally seen and re-examined the patient and performed the medical decision-making components (assessment and plan of care). I have reviewed the documentation and verified the findings in the note as written with additions or exceptions as stated in the body of this note.     Dr. Gabbi Payne MD  Internal Medicine

## 2024-09-07 NOTE — ED PROVIDER NOTES
HPI   Chief Complaint   Patient presents with    Wound Infection     States about 2 weeks ago his left foot and ankle started swelling then a callus opened up around the ball of the foot and at first was leaking clear fluid but now it is yellow. Went to urgent care and was sent here.        Patient is a 53-year-old male with significant PMH of rheumatoid arthritis hypertension presents to the ED with cc of left foot pain x 2 weeks.  Patient denies any injury to the area.  Patient is able to bear weight but it is painful.  Patient states it is the bottom of his foot that is painful.  Patient states it felt almost like a rheumatoid arthritis flare.  Patient denies any fever chills, numbness, tingling, chest pain, shortness of breath, URI symptoms, nausea, vomiting, abdominal pain, diarrhea, constipation.  Patient has not had any pain medication for symptoms. Patient smokes less than a pack per day.  Patient drinks a couple beers daily.  Patient denies any street drug abuse.              Patient History   Past Medical History:   Diagnosis Date    Anemia     Arthritis     Depression     Hypertension     Personal history of other diseases of the musculoskeletal system and connective tissue     History of backache    Radiculopathy, cervical region 02/14/2022    Cervical radiculopathy, acute    Rash and other nonspecific skin eruption 12/07/2022    Rash     Past Surgical History:   Procedure Laterality Date    APPENDECTOMY  03/01/2017    Appendectomy    OTHER SURGICAL HISTORY Right 01/13/2019    Elbow surgery    SPINE SURGERY      Cyst removed from spine     No family history on file.  Social History     Tobacco Use    Smoking status: Every Day     Current packs/day: 0.25     Average packs/day: 0.3 packs/day for 4.0 years (1.0 ttl pk-yrs)     Types: Cigarettes    Smokeless tobacco: Never   Vaping Use    Vaping status: Never Used   Substance Use Topics    Alcohol use: Yes     Comment: socially    Drug use: Never        Physical Exam   ED Triage Vitals [09/07/24 1347]   Temperature Heart Rate Respirations BP   36.4 °C (97.5 °F) 78 16 (!) 135/94      Pulse Ox Temp Source Heart Rate Source Patient Position   96 % Temporal Monitor Sitting      BP Location FiO2 (%)     Left arm --       Physical Exam  Cardiovascular:      Pulses: Normal pulses.   Pulmonary:      Effort: Pulmonary effort is normal.   Musculoskeletal:         General: Tenderness present. Normal range of motion.      Comments: Severe hallux valgus deformity on left foot with bunion.  Small wound on the plantar aspect of forefoot.  Pain on palpation to the dorsal and plantar aspects of the forefoot and to the second digit no obvious erythema.  Not hot to touch.  There is edema appreciated   Skin:     General: Skin is warm.      Findings: No erythema.   Neurological:      General: No focal deficit present.      Mental Status: He is alert and oriented to person, place, and time. Mental status is at baseline.      Cranial Nerves: No cranial nerve deficit.      Sensory: No sensory deficit.      Motor: No weakness.           ED Course & MDM   Diagnoses as of 09/07/24 1558   Septic arthritis of left foot, due to unspecified organism (Multi)                 No data recorded                                 Medical Decision Making  Medical Decision Making:  Patient presented as described in HPI. Patient case including ROS, PE, and treatment and plan discussed with ED attending if attached as cosigner. Due to patients presentation orders completed include as documented.  Patient presents from urgent care today after being told he had a infection in his foot.  Patient states he has had symptoms for 2 weeks.  Patient denies any injury.  Patient is nontoxic-appearing, Severe hallux valgus deformity on left foot with bunion.  Small wound on the plantar aspect of forefoot.  Pain on palpation to the dorsal and plantar aspects of the forefoot and to the second digit no obvious  erythema.  Not hot to touch.  There is edema appreciated.  Pulses intact.  Patient given Toradol for symptoms and IV abx.  Pending labs and imaging.  CRP is 3.75 ESR is 48.  Leukocytosis 12.4.  I reached out to Dr. Carr in podiatry here at [place] who will consult on the patient.  I spoke with the hospitalist who accepted the patient.  Patient is agreeable to plan.  Patient remained stable and will be admitted.        Patient care discussed with: N/A  Social Determinants affecting care: N/A    Final diagnosis and disposition as below.  See CI    Hospitalize. I discussed the differential; results and admit plan with the patient and/or family/friend/caregiver if present.  I emphasized the importance of hospitalization need for re-evaluation/continued monitoring/interventions.. They agreed that if they feel their condition is worsening or if they have any other concern they should alert staff immediately for further assistance. I gave the patient an opportunity to ask all questions they had and answered all of them accordingly. The patient and/or family/friend/caregiver expressed understanding verbally and that they would comply.        Disposition:  admit    Hospitalize. Discussed findings and treatment done here in ED with admitting physician. It would be a risk to discharge the patient in their condition due to possibility of deterioration in their condition and the need for urgent interventions.    This note has been transcribed using voice recognition and may contain grammatical errors, misplaced words, incorrect words, incorrect phrases or other errors.        Labs Reviewed   CBC WITH AUTO DIFFERENTIAL - Abnormal       Result Value    WBC 12.4 (*)     nRBC 0.0      RBC 4.44 (*)     Hemoglobin 12.8 (*)     Hematocrit 38.7 (*)     MCV 87      MCH 28.8      MCHC 33.1      RDW 13.6      Platelets 393      Neutrophils % 71.1      Immature Granulocytes %, Automated 0.6      Lymphocytes % 16.3      Monocytes % 9.7       Eosinophils % 1.7      Basophils % 0.6      Neutrophils Absolute 8.82 (*)     Immature Granulocytes Absolute, Automated 0.07      Lymphocytes Absolute 2.02      Monocytes Absolute 1.20 (*)     Eosinophils Absolute 0.21      Basophils Absolute 0.07     C-REACTIVE PROTEIN - Abnormal    C-Reactive Protein 3.75 (*)    SEDIMENTATION RATE, AUTOMATED - Abnormal    Sedimentation Rate 48 (*)    COMPREHENSIVE METABOLIC PANEL - Normal    Glucose 80      Sodium 137      Potassium 4.7      Chloride 103      Bicarbonate 28      Anion Gap 11      Urea Nitrogen 14      Creatinine 0.87      eGFR >90      Calcium 8.9      Albumin 3.6      Alkaline Phosphatase 85      Total Protein 7.3      AST 38      Bilirubin, Total 0.5      ALT 22     LACTATE - Normal    Lactate 1.4      Narrative:     Venipuncture immediately after or during the administration of Metamizole may lead to falsely low results. Testing should be performed immediately  prior to Metamizole dosing.      Procedure  Procedures     Caityln Bryant PA-C  09/07/24 2924

## 2024-09-08 PROBLEM — M00.9: Status: ACTIVE | Noted: 2024-09-08

## 2024-09-08 LAB
ANION GAP SERPL CALC-SCNC: 14 MMOL/L (ref 10–20)
BUN SERPL-MCNC: 15 MG/DL (ref 6–23)
CALCIUM SERPL-MCNC: 8.1 MG/DL (ref 8.6–10.3)
CHLORIDE SERPL-SCNC: 106 MMOL/L (ref 98–107)
CO2 SERPL-SCNC: 24 MMOL/L (ref 21–32)
CREAT SERPL-MCNC: 0.73 MG/DL (ref 0.5–1.3)
EGFRCR SERPLBLD CKD-EPI 2021: >90 ML/MIN/1.73M*2
ERYTHROCYTE [DISTWIDTH] IN BLOOD BY AUTOMATED COUNT: 13.6 % (ref 11.5–14.5)
GLUCOSE SERPL-MCNC: 97 MG/DL (ref 74–99)
HCT VFR BLD AUTO: 34.6 % (ref 41–52)
HGB BLD-MCNC: 11.2 G/DL (ref 13.5–17.5)
MCH RBC QN AUTO: 28.4 PG (ref 26–34)
MCHC RBC AUTO-ENTMCNC: 32.4 G/DL (ref 32–36)
MCV RBC AUTO: 88 FL (ref 80–100)
NRBC BLD-RTO: 0 /100 WBCS (ref 0–0)
PLATELET # BLD AUTO: 239 X10*3/UL (ref 150–450)
POTASSIUM SERPL-SCNC: 3.8 MMOL/L (ref 3.5–5.3)
RBC # BLD AUTO: 3.94 X10*6/UL (ref 4.5–5.9)
SODIUM SERPL-SCNC: 140 MMOL/L (ref 136–145)
WBC # BLD AUTO: 9.2 X10*3/UL (ref 4.4–11.3)

## 2024-09-08 PROCEDURE — 99233 SBSQ HOSP IP/OBS HIGH 50: CPT | Performed by: NURSE PRACTITIONER

## 2024-09-08 PROCEDURE — 2500000004 HC RX 250 GENERAL PHARMACY W/ HCPCS (ALT 636 FOR OP/ED): Mod: IPSPLIT | Performed by: NURSE PRACTITIONER

## 2024-09-08 PROCEDURE — 82374 ASSAY BLOOD CARBON DIOXIDE: CPT | Performed by: NURSE PRACTITIONER

## 2024-09-08 PROCEDURE — 80048 BASIC METABOLIC PNL TOTAL CA: CPT | Performed by: NURSE PRACTITIONER

## 2024-09-08 PROCEDURE — 94760 N-INVAS EAR/PLS OXIMETRY 1: CPT

## 2024-09-08 PROCEDURE — 85027 COMPLETE CBC AUTOMATED: CPT | Performed by: NURSE PRACTITIONER

## 2024-09-08 PROCEDURE — 2500000001 HC RX 250 WO HCPCS SELF ADMINISTERED DRUGS (ALT 637 FOR MEDICARE OP)

## 2024-09-08 PROCEDURE — 2500000004 HC RX 250 GENERAL PHARMACY W/ HCPCS (ALT 636 FOR OP/ED)

## 2024-09-08 PROCEDURE — 1100000001 HC PRIVATE ROOM DAILY: Mod: IPSPLIT

## 2024-09-08 PROCEDURE — 36415 COLL VENOUS BLD VENIPUNCTURE: CPT | Performed by: NURSE PRACTITIONER

## 2024-09-08 RX ORDER — SODIUM CHLORIDE 9 MG/ML
INJECTION, SOLUTION INTRAVENOUS
Status: DISPENSED
Start: 2024-09-08 | End: 2024-09-08

## 2024-09-08 RX ORDER — VANCOMYCIN 1.5 G/300ML
1500 INJECTION, SOLUTION INTRAVENOUS EVERY 12 HOURS
Status: DISCONTINUED | OUTPATIENT
Start: 2024-09-08 | End: 2024-09-08

## 2024-09-08 RX ORDER — SODIUM CHLORIDE 9 MG/ML
10 INJECTION, SOLUTION INTRAVENOUS CONTINUOUS PRN
Status: ACTIVE | OUTPATIENT
Start: 2024-09-08

## 2024-09-08 RX ORDER — VANCOMYCIN HYDROCHLORIDE 1.5 G/30ML
INJECTION, POWDER, LYOPHILIZED, FOR SOLUTION INTRAVENOUS
Status: DISPENSED
Start: 2024-09-08 | End: 2024-09-08

## 2024-09-08 RX ORDER — VANCOMYCIN 1.5 G/300ML
1500 INJECTION, SOLUTION INTRAVENOUS EVERY 12 HOURS
Status: DISPENSED | OUTPATIENT
Start: 2024-09-08

## 2024-09-08 ASSESSMENT — LIFESTYLE VARIABLES
VISUAL DISTURBANCES: NOT PRESENT
AGITATION: NORMAL ACTIVITY
ORIENTATION AND CLOUDING OF SENSORIUM: ORIENTED AND CAN DO SERIAL ADDITIONS
AGITATION: NORMAL ACTIVITY
ORIENTATION AND CLOUDING OF SENSORIUM: ORIENTED AND CAN DO SERIAL ADDITIONS
ORIENTATION AND CLOUDING OF SENSORIUM: ORIENTED AND CAN DO SERIAL ADDITIONS
PAROXYSMAL SWEATS: NO SWEAT VISIBLE
AGITATION: NORMAL ACTIVITY
TOTAL SCORE: 0
PAROXYSMAL SWEATS: NO SWEAT VISIBLE
PAROXYSMAL SWEATS: NO SWEAT VISIBLE
ORIENTATION AND CLOUDING OF SENSORIUM: ORIENTED AND CAN DO SERIAL ADDITIONS
ANXIETY: NO ANXIETY, AT EASE
ORIENTATION AND CLOUDING OF SENSORIUM: ORIENTED AND CAN DO SERIAL ADDITIONS
HEADACHE, FULLNESS IN HEAD: NOT PRESENT
AUDITORY DISTURBANCES: NOT PRESENT
ANXIETY: NO ANXIETY, AT EASE
TOTAL SCORE: 3
HEADACHE, FULLNESS IN HEAD: NOT PRESENT
AGITATION: NORMAL ACTIVITY
TREMOR: NO TREMOR
AGITATION: SOMEWHAT MORE THAN NORMAL ACTIVITY
ANXIETY: NO ANXIETY, AT EASE
TREMOR: NO TREMOR
AUDITORY DISTURBANCES: NOT PRESENT
VISUAL DISTURBANCES: NOT PRESENT
PAROXYSMAL SWEATS: NO SWEAT VISIBLE
VISUAL DISTURBANCES: NOT PRESENT
AGITATION: NORMAL ACTIVITY
VISUAL DISTURBANCES: NOT PRESENT
AUDITORY DISTURBANCES: NOT PRESENT
NAUSEA AND VOMITING: NO NAUSEA AND NO VOMITING
AGITATION: NORMAL ACTIVITY
NAUSEA AND VOMITING: NO NAUSEA AND NO VOMITING
TOTAL SCORE: 0
ANXIETY: NO ANXIETY, AT EASE
ANXIETY: NO ANXIETY, AT EASE
AUDITORY DISTURBANCES: NOT PRESENT
HEADACHE, FULLNESS IN HEAD: NOT PRESENT
NAUSEA AND VOMITING: NO NAUSEA AND NO VOMITING
TREMOR: NO TREMOR
TREMOR: NO TREMOR
HEADACHE, FULLNESS IN HEAD: NOT PRESENT
ANXIETY: MILDLY ANXIOUS
TREMOR: NO TREMOR
NAUSEA AND VOMITING: NO NAUSEA AND NO VOMITING
ANXIETY: NO ANXIETY, AT EASE
ORIENTATION AND CLOUDING OF SENSORIUM: ORIENTED AND CAN DO SERIAL ADDITIONS
ORIENTATION AND CLOUDING OF SENSORIUM: ORIENTED AND CAN DO SERIAL ADDITIONS
AUDITORY DISTURBANCES: NOT PRESENT
TREMOR: NO TREMOR
HEADACHE, FULLNESS IN HEAD: NOT PRESENT
TOTAL SCORE: 0
HEADACHE, FULLNESS IN HEAD: VERY MILD
AUDITORY DISTURBANCES: NOT PRESENT
VISUAL DISTURBANCES: NOT PRESENT
TOTAL SCORE: 0
PAROXYSMAL SWEATS: NO SWEAT VISIBLE
AUDITORY DISTURBANCES: NOT PRESENT
NAUSEA AND VOMITING: NO NAUSEA AND NO VOMITING
NAUSEA AND VOMITING: NO NAUSEA AND NO VOMITING
VISUAL DISTURBANCES: NOT PRESENT
TOTAL SCORE: 0
TOTAL SCORE: 0
PAROXYSMAL SWEATS: NO SWEAT VISIBLE
ANXIETY: NO ANXIETY, AT EASE
PAROXYSMAL SWEATS: NO SWEAT VISIBLE
AGITATION: NORMAL ACTIVITY
ORIENTATION AND CLOUDING OF SENSORIUM: ORIENTED AND CAN DO SERIAL ADDITIONS
TREMOR: NO TREMOR
HEADACHE, FULLNESS IN HEAD: NOT PRESENT
PAROXYSMAL SWEATS: NO SWEAT VISIBLE
NAUSEA AND VOMITING: NO NAUSEA AND NO VOMITING
HEADACHE, FULLNESS IN HEAD: NOT PRESENT
TOTAL SCORE: 0
VISUAL DISTURBANCES: NOT PRESENT
AUDITORY DISTURBANCES: NOT PRESENT
PULSE: 75
TREMOR: NO TREMOR
NAUSEA AND VOMITING: NO NAUSEA AND NO VOMITING
VISUAL DISTURBANCES: NOT PRESENT

## 2024-09-08 ASSESSMENT — COGNITIVE AND FUNCTIONAL STATUS - GENERAL
DAILY ACTIVITIY SCORE: 24
CLIMB 3 TO 5 STEPS WITH RAILING: A LITTLE
WALKING IN HOSPITAL ROOM: A LITTLE
MOBILITY SCORE: 22

## 2024-09-08 ASSESSMENT — ENCOUNTER SYMPTOMS
DIARRHEA: 0
PSYCHIATRIC NEGATIVE: 1
MYALGIAS: 0
VOMITING: 0
BACK PAIN: 0
NUMBNESS: 0
WOUND: 1
CHILLS: 0
BRUISES/BLEEDS EASILY: 0
FEVER: 0
JOINT SWELLING: 1
DIZZINESS: 0
LIGHT-HEADEDNESS: 0
SHORTNESS OF BREATH: 0
NAUSEA: 0
ARTHRALGIAS: 1

## 2024-09-08 ASSESSMENT — PAIN SCALES - GENERAL
PAINLEVEL_OUTOF10: 5 - MODERATE PAIN
PAINLEVEL_OUTOF10: 0 - NO PAIN
PAINLEVEL_OUTOF10: 0 - NO PAIN

## 2024-09-08 ASSESSMENT — PAIN DESCRIPTION - LOCATION: LOCATION: FOOT

## 2024-09-08 ASSESSMENT — PAIN - FUNCTIONAL ASSESSMENT: PAIN_FUNCTIONAL_ASSESSMENT: 0-10

## 2024-09-08 ASSESSMENT — PAIN DESCRIPTION - ORIENTATION: ORIENTATION: LEFT

## 2024-09-08 NOTE — CARE PLAN
"The patient's goals for the shift include  getting rest    The clinical goals for the shift include Pt will report pain <6 in left foot this shift.    Over the shift, the patient did make progress toward the following goals. Patient continues to report \"3-4\"/10 pain in left foot but declines any pain management measures and claims the pain is \"tolerable\". IV Vancomycin started this shift with no adverse reactions observed thus far. IV Vanco dose not available, but [facility] Pharmacist contacted and provided orders for reconstitution. Wound picture and treatment completed. Wound tender to touch per patient. CIWA score remains at 0.  "

## 2024-09-08 NOTE — CONSULTS
Consults    Reason For Consult  Septic arthritis in the left foot    History Of Present Illness  Domo Arcos is a 53 y.o. male presenting with painful left foot wound ongoing for the past 2 weeks.  Podiatry consulted to evaluate for possible left foot septic arthritis.  Patient relates painful ulcer under the second metatarsal head from rubbing of his shoe gear.  Patient states that he has noted drainage and significant pain especially with weightbearing and ambulation.  Patient states that he has not had this issue in the past.  He does see us his podiatrist for his feet as he has rheumatoid arthritis.  Denies any nausea, vomiting, shortness of breath, fever or chills chest pain.  Does not relate any new trauma .  Today patient relates that he does not have any pain to the left foot if it is not being touched.      Past Medical History  He has a past medical history of Anemia, Arthritis, Depression, Hypertension, Personal history of other diseases of the musculoskeletal system and connective tissue, Radiculopathy, cervical region (02/14/2022), and Rash and other nonspecific skin eruption (12/07/2022).  Rheumatoid arthritis on Victoza    Surgical History  He has a past surgical history that includes Appendectomy (03/01/2017); Other surgical history (Right, 01/13/2019); and Spine surgery.     Social History  He reports that he has been smoking cigarettes. He has a 1 pack-year smoking history. He has never used smokeless tobacco. He reports current alcohol use. He reports that he does not use drugs.  Reports 40-year tobacco use    Family History  Family History   Problem Relation Name Age of Onset    Rashes / Skin problems Brother          Allergies  Patient has no known allergies.  Review of Systems   Constitutional:  Negative for chills and fever.   Respiratory:  Negative for shortness of breath.    Cardiovascular:  Negative for chest pain and leg swelling.   Gastrointestinal:  Negative for diarrhea, nausea and  vomiting.   Genitourinary: Negative.    Musculoskeletal:  Positive for arthralgias, gait problem and joint swelling. Negative for back pain and myalgias.   Skin:  Positive for wound.   Neurological:  Negative for dizziness, light-headedness and numbness.   Hematological:  Does not bruise/bleed easily.   Psychiatric/Behavioral: Negative.        Objective:  General: Patient is alert and oriented x 3, resting comfortably in bed.  Wife was present at bedside.    Vasc: DP and PT pulses are palpable bilateral.  CFT is less than 3 seconds bilateral.  Skin temperature is warm to cool proximal to distal bilateral.  No streaking cellulitis, localized left foot swelling.      Neuro:  Light touch is intact to the foot bilateral.  Protective sensation is intact to the foot when tested with the 5.07 SWM bilateral.  There is no clonus noted.  Muscle tone symmetrical to bilateral lower extremity.  Negative Tinel's sign to percussion along the peroneal, tibial and sural nerve    Derm: New pinpoint wound noted subsecond metatarsal head with purulent drainage.  There was lifted hyperkeratotic skin around the wound with periwound edema and erythema.  There was no malodor noted.  Wound probes 1.7 cm deep, bone was not felt.  There is bursal tissue buildup noted subfifth, second and third metatarsal heads.  There is erythema noted to the medial aspect of bilateral first MPJ where the skin is thin and friable due to protrusion of the metatarsal head.      Ortho: Severe cavus foot type with track bound hallux valgus deviation and clawing of the lesser digits bilaterally.  There is pain with palpation under the second and third metatarsal phalangeal joints as well as corresponding metatarsal heads.  Muscle strength is 5/5 for all pedal groups tested.  Ankle joint, subtalar joint ROM is full and without pain or crepitus.  Range of motion to all metatarsal phalangeal joints are little limited.      Physical Exam  Constitutional:        "Appearance: Normal appearance. He is obese.   HENT:      Head: Normocephalic and atraumatic.      Nose: Nose normal.      Mouth/Throat:      Mouth: Mucous membranes are dry.      Pharynx: Oropharynx is clear.   Cardiovascular:      Rate and Rhythm: Normal rate and regular rhythm.   Pulmonary:      Effort: Pulmonary effort is normal.      Breath sounds: Normal breath sounds.   Abdominal:      General: Bowel sounds are normal.      Palpations: Abdomen is soft.   Neurological:      Mental Status: He is alert.        Last Recorded Vitals  Blood pressure 124/79, pulse 67, temperature 37.5 °C (99.5 °F), temperature source Temporal, resp. rate 16, height 1.676 m (5' 6\"), weight 107 kg (235 lb 7.2 oz), SpO2 96%.    Relevant Results  Lab Results   Component Value Date    WBC 9.2 09/08/2024    HGB 11.2 (L) 09/08/2024    HCT 34.6 (L) 09/08/2024    MCV 88 09/08/2024     09/08/2024      Lab Results   Component Value Date    GLUCOSE 97 09/08/2024    CALCIUM 8.1 (L) 09/08/2024     09/08/2024    K 3.8 09/08/2024    CO2 24 09/08/2024     09/08/2024    BUN 15 09/08/2024    CREATININE 0.73 09/08/2024      Lab Results   Component Value Date    CRP 3.75 (H) 09/07/2024      Lab Results   Component Value Date    SEDRATE 48 (H) 09/07/2024      === 09/07/24 ===    XR FOOT 3+ VIEWS LEFT    - Impression -  Multifocal subluxations of the MTP joints as detailed above with  severe hallux valgus deformity.    Superimposed subarticular bone destruction at the 2nd MTP joint  suspicious for septic arthritis in the appropriate clinical setting.    Diffuse soft tissue swelling of the forefoot.      MACRO:  None    Signed by: Wali Bustamante 9/7/2024 1:22 PM  Dictation workstation:   [id number]     Synthesis: Domo Arcos is a 53 y.o. male presenting with painful left foot wound ongoing for the past 2 weeks.  Podiatry consulted to evaluate for possible left foot septic arthritis.      In ED patient presented with a " leukocytosis of 12.4, sed rate of 48 and CRP of 3.75.  He was started on ceftriaxone and vancomycin.  Which has since downtrended leukocytosis from 12.4 to 9.2 today.  X-rays concerning for possible septic arthritis at the second tarsometatarsal phalangeal joint.    Assessment/Plan     #Left foot ulcer into muscle without necrosis  #Bursitis  #Rheumatoid arthritis  #Pes cavus with pseudo equinus foot deformity    Patient has severe digital deformities which includes hallux valgus and digital dislocations and subluxation due to rheumatoid arthritis.  He also has severely cavus foot type which causes overload of forefoot pressures resulting in inflamed bursal tissue, pressure ulcers and pain.  It is our recommendation that consider a forefoot reconstruction which includes a pan metatarsal head resection with fusion of the first metatarsal phalangeal joint for resolution and to prevent recurrence of ulcer or worsening infection.  Patient would like to think about this option and will provide a decision once MRI is completed.  We will follow-up on pending MRI and we discussed options with patient.  For now we recommend continuation with wound care, Betadine.  There is no restriction on weightbearing for patient.    Ольга Holliday D.P.M., MS

## 2024-09-08 NOTE — PROGRESS NOTES
Domo Arcos is a 53 y.o. male on day 0 of admission presenting with Septic arthritis (Multi).    Subjective   He is resting in bed this morning, left foot dressed. Small amount yellow mucoid drainage on dressing. Pain with palpation. Pulses palpable, warm to touch. We discussed Podiatry consultation and IV antibiotics, all questions answered.     Objective     Physical Exam  Constitutional:       General: He is not in acute distress.     Appearance: Normal appearance. He is not toxic-appearing.   HENT:      Head: Normocephalic and atraumatic.      Mouth/Throat:      Mouth: Mucous membranes are moist.   Eyes:      Extraocular Movements: Extraocular movements intact.      Pupils: Pupils are equal, round, and reactive to light.   Cardiovascular:      Rate and Rhythm: Normal rate and regular rhythm.      Pulses: Normal pulses.      Heart sounds: Normal heart sounds. No murmur heard.     No gallop.   Pulmonary:      Effort: Pulmonary effort is normal. No respiratory distress.      Breath sounds: Normal breath sounds. No wheezing, rhonchi or rales.   Abdominal:      General: Bowel sounds are normal. There is no distension.      Palpations: Abdomen is soft.      Tenderness: There is no abdominal tenderness. There is no guarding or rebound.   Musculoskeletal:         General: Deformity (RA joint deformities bilaterally) present. No swelling, tenderness or signs of injury. Normal range of motion.      Cervical back: Normal range of motion and neck supple.   Skin:     General: Skin is warm and dry.      Capillary Refill: Capillary refill takes less than 2 seconds.      Coloration: Skin is not jaundiced.      Findings: Lesion (plantar surface, left foot) present. No bruising or rash.   Neurological:      General: No focal deficit present.      Mental Status: He is alert and oriented to person, place, and time.      Cranial Nerves: No cranial nerve deficit.      Sensory: No sensory deficit.      Motor: No weakness.      Gait:  "Gait normal.   Psychiatric:         Mood and Affect: Mood normal.         Behavior: Behavior normal.         Thought Content: Thought content normal.         Judgment: Judgment normal.     Last Recorded Vitals  Blood pressure 124/79, pulse 67, temperature 37.5 °C (99.5 °F), temperature source Temporal, resp. rate 16, height 1.676 m (5' 6\"), weight 107 kg (235 lb 7.2 oz), SpO2 96%.  Intake/Output last 3 Shifts:  I/O last 3 completed shifts:  In: 400 (3.7 mL/kg) [P.O.:400]  Out: - (0 mL/kg)   Weight: 106.8 kg     Scheduled medications  atorvastatin, 40 mg, oral, Daily  cefTRIAXone, 1 g, intravenous, q24h  cholecalciferol, 2,000 Units, oral, Daily  cyanocobalamin, 1,000 mcg, intramuscular, q30 days  enoxaparin, 40 mg, subcutaneous, q24h  escitalopram, 30 mg, oral, Daily  ferrous sulfate (325 mg ferrous sulfate), 1 tablet, oral, Daily with breakfast  folic acid, 1 mg, oral, Daily  leflunomide, 20 mg, oral, Daily  lisinopril, 10 mg, oral, BID  metoprolol tartrate, 50 mg, oral, BID  multivitamin, 1 tablet, oral, Daily  pantoprazole, 40 mg, oral, Daily before breakfast   Or  pantoprazole, 40 mg, intravenous, Daily before breakfast  sennosides-docusate sodium, 1 tablet, oral, BID  sodium chloride 0.9%, , ,   sodium chloride 0.9%, , ,   thiamine, 100 mg, oral, Daily  tofacitinib ER, 11 mg, oral, Daily  vancomycin, 1,500 mg, intravenous, q12h  vancomycin, , ,     Continuous medications  sodium chloride 0.9%, 10 mL/hr    PRN medications  PRN medications: acetaminophen, acetaminophen, benzocaine-menthol, calcium carbonate, dextromethorphan-guaifenesin, diazePAM, guaiFENesin, melatonin, ondansetron **OR** ondansetron, sodium chloride 0.9%, sodium chloride 0.9%, sodium chloride 0.9%, traMADol, vancomycin, vancomycin    Relevant Results  XR foot left 3+ views  Result Date: 9/7/2024  Multifocal subluxations of the MTP joints as detailed above with severe hallux valgus deformity.   Superimposed subarticular bone destruction at the " 2nd MTP joint suspicious for septic arthritis in the appropriate clinical setting.   Diffuse soft tissue swelling of the forefoot.     MACRO: None   Signed by: Wali Bustamante 9/7/2024 1:22 PM Dictation workstation:   [id number]      Latest Reference Range & Units 09/07/24 15:01 09/08/24 06:02   GLUCOSE 74 - 99 mg/dL 80 97   SODIUM 136 - 145 mmol/L 137 140   POTASSIUM 3.5 - 5.3 mmol/L 4.7 3.8   CHLORIDE 98 - 107 mmol/L 103 106   Bicarbonate 21 - 32 mmol/L 28 24   Anion Gap 10 - 20 mmol/L 11 14   Blood Urea Nitrogen 6 - 23 mg/dL 14 15   Creatinine 0.50 - 1.30 mg/dL 0.87 0.73   EGFR >60 mL/min/1.73m*2 >90 >90   Calcium 8.6 - 10.3 mg/dL 8.9 8.1 (L)      Latest Reference Range & Units 09/07/24 15:01 09/08/24 06:02   WBC 4.4 - 11.3 x10*3/uL 12.4 (H) 9.2   nRBC 0.0 - 0.0 /100 WBCs 0.0 0.0   RBC 4.50 - 5.90 x10*6/uL 4.44 (L) 3.94 (L)   HEMOGLOBIN 13.5 - 17.5 g/dL 12.8 (L) 11.2 (L)   HEMATOCRIT 41.0 - 52.0 % 38.7 (L) 34.6 (L)   MCV 80 - 100 fL 87 88   MCH 26.0 - 34.0 pg 28.8 28.4   MCHC 32.0 - 36.0 g/dL 33.1 32.4   RED CELL DISTRIBUTION WIDTH 11.5 - 14.5 % 13.6 13.6   Platelets 150 - 450 x10*3/uL 393 239      Latest Reference Range & Units 09/07/24 15:01   C-Reactive Protein <1.00 mg/dL 3.75 (H)     Assessment/Plan   Assessment & Plan  Septic arthritis (Multi)    Anemia    Rheumatoid arthritis (Multi)    Depression    Hypertension    Vitamin D deficiency    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  Superimposed subarticular bone destruction at the 2nd MTP joint suspicious for septic arthritis in the appropriate clinical setting. Diffuse soft tissue swelling of the forefoot.  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)  -continue Arava 20 mg PO Daily   -continue Xeljanz 11 mg PO Daily      #Depression  -continue Lexapro 30 mg PO Daily      #Hypertension  -Bps stable since admission  -continue Lisinopril 10 mg PO BID  -continue Lopressor 50 mg PO BID     #Vitamin D deficiency  -continue Vitamin D 2,000 units PO Daily      #Uncomplicated alcohol dependence (Multi)  - [facility] Protocol   - Folic Acid 1 mg PO Daily  - Multivitamin Daily   - Vitamin B-1 100 mg PO Daily      #Hyperlipidemia  - , VLDL 46, trig 232, chol 217 (10/4/2023)  - continue Lipitor 40 mg PO Daily      DVT Prophylaxis: Lovenox   Fluids: N/A  Nutrition: Cardiac    Code Status: Full Code   Pt requires inpatient stay at this time.    Elsie June APRN-CNP

## 2024-09-09 ENCOUNTER — APPOINTMENT (OUTPATIENT)
Dept: RADIOLOGY | Facility: HOSPITAL | Age: 54
DRG: 501 | End: 2024-09-09
Payer: COMMERCIAL

## 2024-09-09 ENCOUNTER — PHARMACY VISIT (OUTPATIENT)
Dept: PHARMACY | Facility: CLINIC | Age: 54
End: 2024-09-09
Payer: COMMERCIAL

## 2024-09-09 VITALS
SYSTOLIC BLOOD PRESSURE: 113 MMHG | HEART RATE: 68 BPM | TEMPERATURE: 96 F | WEIGHT: 235.45 LBS | BODY MASS INDEX: 37.84 KG/M2 | DIASTOLIC BLOOD PRESSURE: 76 MMHG | OXYGEN SATURATION: 95 % | HEIGHT: 66 IN | RESPIRATION RATE: 16 BRPM

## 2024-09-09 LAB
ANION GAP SERPL CALC-SCNC: 10 MMOL/L (ref 10–20)
BUN SERPL-MCNC: 11 MG/DL (ref 6–23)
CALCIUM SERPL-MCNC: 7.9 MG/DL (ref 8.6–10.3)
CHLORIDE SERPL-SCNC: 110 MMOL/L (ref 98–107)
CO2 SERPL-SCNC: 24 MMOL/L (ref 21–32)
CREAT SERPL-MCNC: 0.73 MG/DL (ref 0.5–1.3)
EGFRCR SERPLBLD CKD-EPI 2021: >90 ML/MIN/1.73M*2
ERYTHROCYTE [DISTWIDTH] IN BLOOD BY AUTOMATED COUNT: 13.6 % (ref 11.5–14.5)
GLUCOSE SERPL-MCNC: 107 MG/DL (ref 74–99)
HCT VFR BLD AUTO: 33.7 % (ref 41–52)
HGB BLD-MCNC: 10.6 G/DL (ref 13.5–17.5)
HOLD SPECIMEN: NORMAL
MCH RBC QN AUTO: 28.8 PG (ref 26–34)
MCHC RBC AUTO-ENTMCNC: 31.5 G/DL (ref 32–36)
MCV RBC AUTO: 92 FL (ref 80–100)
NRBC BLD-RTO: 0 /100 WBCS (ref 0–0)
PLATELET # BLD AUTO: 234 X10*3/UL (ref 150–450)
POTASSIUM SERPL-SCNC: 3.7 MMOL/L (ref 3.5–5.3)
RBC # BLD AUTO: 3.68 X10*6/UL (ref 4.5–5.9)
SODIUM SERPL-SCNC: 140 MMOL/L (ref 136–145)
VANCOMYCIN SERPL-MCNC: 8.7 UG/ML (ref 5–20)
WBC # BLD AUTO: 10.6 X10*3/UL (ref 4.4–11.3)

## 2024-09-09 PROCEDURE — 85027 COMPLETE CBC AUTOMATED: CPT | Mod: IPSPLIT | Performed by: NURSE PRACTITIONER

## 2024-09-09 PROCEDURE — 99233 SBSQ HOSP IP/OBS HIGH 50: CPT | Performed by: NURSE PRACTITIONER

## 2024-09-09 PROCEDURE — 80048 BASIC METABOLIC PNL TOTAL CA: CPT | Mod: IPSPLIT | Performed by: NURSE PRACTITIONER

## 2024-09-09 PROCEDURE — 73720 MRI LWR EXTREMITY W/O&W/DYE: CPT | Mod: LEFT SIDE | Performed by: RADIOLOGY

## 2024-09-09 PROCEDURE — 2500000001 HC RX 250 WO HCPCS SELF ADMINISTERED DRUGS (ALT 637 FOR MEDICARE OP): Mod: IPSPLIT

## 2024-09-09 PROCEDURE — 80202 ASSAY OF VANCOMYCIN: CPT | Mod: IPSPLIT | Performed by: NURSE PRACTITIONER

## 2024-09-09 PROCEDURE — 73720 MRI LWR EXTREMITY W/O&W/DYE: CPT | Mod: LT,IPSPLIT

## 2024-09-09 PROCEDURE — 93922 UPR/L XTREMITY ART 2 LEVELS: CPT | Mod: IPSPLIT

## 2024-09-09 PROCEDURE — 36415 COLL VENOUS BLD VENIPUNCTURE: CPT | Mod: IPSPLIT | Performed by: NURSE PRACTITIONER

## 2024-09-09 PROCEDURE — 2500000004 HC RX 250 GENERAL PHARMACY W/ HCPCS (ALT 636 FOR OP/ED): Mod: IPSPLIT | Performed by: INTERNAL MEDICINE

## 2024-09-09 PROCEDURE — 2500000001 HC RX 250 WO HCPCS SELF ADMINISTERED DRUGS (ALT 637 FOR MEDICARE OP): Mod: IPSPLIT | Performed by: NURSE PRACTITIONER

## 2024-09-09 PROCEDURE — 2550000001 HC RX 255 CONTRASTS: Mod: IPSPLIT | Performed by: INTERNAL MEDICINE

## 2024-09-09 PROCEDURE — 2500000004 HC RX 250 GENERAL PHARMACY W/ HCPCS (ALT 636 FOR OP/ED): Mod: JZ,IPSPLIT | Performed by: NURSE PRACTITIONER

## 2024-09-09 PROCEDURE — A9575 INJ GADOTERATE MEGLUMI 0.1ML: HCPCS | Mod: IPSPLIT | Performed by: INTERNAL MEDICINE

## 2024-09-09 PROCEDURE — 1100000001 HC PRIVATE ROOM DAILY: Mod: IPSPLIT

## 2024-09-09 RX ORDER — VANCOMYCIN 2 G/400ML
2 INJECTION, SOLUTION INTRAVENOUS EVERY 12 HOURS
Status: DISCONTINUED | OUTPATIENT
Start: 2024-09-09 | End: 2024-09-11 | Stop reason: HOSPADM

## 2024-09-09 RX ORDER — GADOTERATE MEGLUMINE 376.9 MG/ML
20 INJECTION INTRAVENOUS
Status: COMPLETED | OUTPATIENT
Start: 2024-09-09 | End: 2024-09-09

## 2024-09-09 ASSESSMENT — LIFESTYLE VARIABLES
ORIENTATION AND CLOUDING OF SENSORIUM: ORIENTED AND CAN DO SERIAL ADDITIONS
AUDITORY DISTURBANCES: NOT PRESENT
HEADACHE, FULLNESS IN HEAD: NOT PRESENT
PAROXYSMAL SWEATS: NO SWEAT VISIBLE
NAUSEA AND VOMITING: NO NAUSEA AND NO VOMITING
TREMOR: NO TREMOR
TREMOR: NO TREMOR
NAUSEA AND VOMITING: NO NAUSEA AND NO VOMITING
HEADACHE, FULLNESS IN HEAD: NOT PRESENT
ORIENTATION AND CLOUDING OF SENSORIUM: ORIENTED AND CAN DO SERIAL ADDITIONS
ORIENTATION AND CLOUDING OF SENSORIUM: ORIENTED AND CAN DO SERIAL ADDITIONS
AUDITORY DISTURBANCES: NOT PRESENT
VISUAL DISTURBANCES: NOT PRESENT
AGITATION: NORMAL ACTIVITY
VISUAL DISTURBANCES: NOT PRESENT
TOTAL SCORE: 0
VISUAL DISTURBANCES: NOT PRESENT
NAUSEA AND VOMITING: NO NAUSEA AND NO VOMITING
HEADACHE, FULLNESS IN HEAD: NOT PRESENT
ANXIETY: NO ANXIETY, AT EASE
TOTAL SCORE: 0
PAROXYSMAL SWEATS: NO SWEAT VISIBLE
ANXIETY: NO ANXIETY, AT EASE
AGITATION: NORMAL ACTIVITY
AUDITORY DISTURBANCES: NOT PRESENT
TREMOR: NO TREMOR
TOTAL SCORE: 0
AGITATION: NORMAL ACTIVITY
PAROXYSMAL SWEATS: NO SWEAT VISIBLE
ANXIETY: NO ANXIETY, AT EASE

## 2024-09-09 ASSESSMENT — PAIN - FUNCTIONAL ASSESSMENT
PAIN_FUNCTIONAL_ASSESSMENT: 0-10
PAIN_FUNCTIONAL_ASSESSMENT: 0-10

## 2024-09-09 ASSESSMENT — COGNITIVE AND FUNCTIONAL STATUS - GENERAL
DAILY ACTIVITIY SCORE: 24
MOBILITY SCORE: 24

## 2024-09-09 ASSESSMENT — PAIN SCALES - GENERAL
PAINLEVEL_OUTOF10: 0 - NO PAIN

## 2024-09-09 NOTE — NURSING NOTE
Dr. Tracy at bedside rounding. Patient to go for MRI and vascular U/S today, patient to determine if surgery will be done during stay or outpatient.

## 2024-09-09 NOTE — PROGRESS NOTES
"Domo Arcos is a 53 y.o. male on day 1 of admission presenting with Septic arthritis (Multi).    Subjective   53-year-old male is resting comfortably in bed.  Patient is awake and alert.  He has no significant pain and states his foot feels much better.  Patient would like to know his options.       Objective     Patient is resting comfortably.  Reevaluated the patient he has 1/4 pulses DP PT respectively with immediate capillary refill time.  Deep tendon reflexes are intact.  Patient has slight hair in his digits.  The patient has equinus condition.  The patient does have a callus underneath the second metatarsal head but there is no ulcer, there is no drainage there is no redness there is no erythema there is no streaking and there is no crepitus.  Patient has severely dislocated first MPJ and dislocated lesser metatarsal phalangeal joints with deformed hammertoe deformities.  Equinus condition is present.  Right foot is not as severe as the left foot.        Last Recorded Vitals  Blood pressure 131/84, pulse 71, temperature 35.6 °C (96 °F), temperature source Temporal, resp. rate 16, height 1.676 m (5' 6\"), weight 107 kg (235 lb 7.2 oz), SpO2 94%.  Intake/Output last 3 Shifts:  I/O last 3 completed shifts:  In: 940 (8.8 mL/kg) [P.O.:640; IV Piggyback:300]  Out: - (0 mL/kg)   Weight: 106.8 kg     Relevant Results      Scheduled medications  atorvastatin, 40 mg, oral, Daily  cefTRIAXone, 1 g, intravenous, q24h  cholecalciferol, 2,000 Units, oral, Daily  cyanocobalamin, 1,000 mcg, intramuscular, q30 days  enoxaparin, 40 mg, subcutaneous, q24h  escitalopram, 30 mg, oral, Daily  ferrous sulfate (325 mg ferrous sulfate), 1 tablet, oral, Daily with breakfast  folic acid, 1 mg, oral, Daily  leflunomide, 20 mg, oral, Daily  lisinopril, 10 mg, oral, BID  metoprolol tartrate, 50 mg, oral, BID  multivitamin, 1 tablet, oral, Daily  pantoprazole, 40 mg, oral, Daily before breakfast   Or  pantoprazole, 40 mg, intravenous, " Daily before breakfast  sennosides-docusate sodium, 1 tablet, oral, BID  thiamine, 100 mg, oral, Daily  tofacitinib ER, 11 mg, oral, Daily  vancomycin, 1,500 mg, intravenous, q12h      Continuous medications  sodium chloride 0.9%, 10 mL/hr, Last Rate: 10 mL/hr (09/09/24 0543)      PRN medications  PRN medications: acetaminophen, acetaminophen, benzocaine-menthol, calcium carbonate, dextromethorphan-guaifenesin, diazePAM, guaiFENesin, melatonin, ondansetron **OR** ondansetron, sodium chloride 0.9%, traMADol, vancomycin  No results found for this or any previous visit (from the past 24 hour(s)).                         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  Remote chance of osteomyelitis.    Plan: At this time I believe the patient would benefit from noninvasive arterial studies.  He would benefit from MRI to see if he has osteo.  I advised the patient now that his foot is stable he most likely in the near future would need surgical intervention.  He would need a rheumatoid reconstruction forefoot procedure.  This would entail a first metatarsal phalangeal joint arthrodesis with plates and screws and pan metatarsal head resections of metatarsal heads 2, 3, 4, 5 with plantar flap closure to reposition the fat pad.  This procedure generally will balance his foot and hopefully eliminate future ulcerations and infections.  Because he smokes we need to order noninvasive arterial studies.  And the MRI will determine what segment he may potentially have osteomyelitis.  But because it is not purulent we can still proceed with the procedure.  This could be done while he is at  the hospital or if he wants to think about it and have it done in the near future.  Patient is on blood thinners we have to address that issue as well and will contact his rheumatologist as he is on some type of daily oral biologic.    The risk of surgery include pain, infection, numbness, scarring, delayed healing, DVT, PE, amputation and other unforeseen complications.  We answered all his questions and he would like to proceed with surgery at some point in time and he will let us know if he wants to do it during this hospital admissions or an outpatient basis.  He did have a back fusion last year took a while for it to heal.  He does work as a  and potentially he can drive with the boot on maybe 2 weeks after surgery.  Stitches will stay in for approximately 4 weeks.  If he has any problems he will call also discussed risk being DVT PE, amputation and even death.  If he does not get this foot fix he will be having recurrent infections cellulitis and the risk of amputation       I spent 45 minutes in the professional and overall care of this patient.      Rigo Tracy DPM

## 2024-09-09 NOTE — CARE PLAN
"The patient's goals for the shift include  resting.    The clinical goals for the shift include Pt will report pain <7 in left foot this shift.    Over the shift, the patient did make progress toward the following goals. At beginning of shift, pt reported \"6\"/10 pain to left foot r/t wound. PRN Tramadol utilized and effective. Pt continuing on IV Vanco without any s/s of adverse reactions. Pt aware of plan of care for following shift. Continues on CIWA scale q4h, highest value being 3 this shift. VSS.  "

## 2024-09-09 NOTE — PROGRESS NOTES
Likely discussion with the medical team reviewing the patient left foot. MRI was done and showed possible osteomyelitis for the second med head. However, patient also has changes of his joints and this is from rheumatoid arthritis. There’s no ulcerations on the rest of the foot and therefore my recommendation Would be for surgical intervention Weily. Will await his PVR studies and if they look good, I just set up surgery for tomorrow. The recommendation for surgery would be for a left foot first metatarsal flange joint arthrodesis with plates and screws. Left foot second, third, fourth and fifth metatarsal head resection with bone biopsies. Left Foot Advancement flap closure so that we can reposition the fat pad underneath the metatarsal head resection regions. Patient will never be 100%. However, I discuss with the patient that if he does not have his foot address, he’s going to ulcerate and get severe infections around the risk of amputation.    Reviewed indication risk of benefits of surgery. Risk include pain, scarring, Nerve damage, infection, bone, infection, nonunion, failed hardware, future surgery, DVT, PE, death, amputation, CRPS, and other unforeseen complications, including never having a perfect foot. The patient will hold off on his biologic for his rheumatoid arthritis and he’ll be nonweightbearing for at least 3 to 4 weeks postop and a splint and then a cast and or possible walking boot this will be a post after decision. Will set up surgery tomorrow afternoon. Quality 110: Preventive Care And Screening: Influenza Immunization: Influenza Immunization Administered during Influenza season Quality 226: Preventive Care And Screening: Tobacco Use: Screening And Cessation Intervention: Patient screened for tobacco use and is an ex/non-smoker Detail Level: Detailed Quality 431: Preventive Care And Screening: Unhealthy Alcohol Use - Screening: Patient not identified as an unhealthy alcohol user when screened for unhealthy alcohol use using a systematic screening method

## 2024-09-09 NOTE — PROGRESS NOTES
Vancomycin Dosing by Pharmacy- FOLLOW UP    Domo Arcos is a 53 y.o. year old male who Pharmacy has been consulted for vancomycin dosing for osteomyelitis/septic arthritis. Based on the patient's indication and renal status this patient is being dosed based on a goal AUC of 400-600.     Renal function is currently stable.    Current vancomycin dose: 1500 mg given every 12 hours    Estimated vancomycin AUC on current dose: 399 mg/L.hr     Visit Vitals  /81 (BP Location: Left arm, Patient Position: Lying)   Pulse 61   Temp 36.3 °C (97.4 °F) (Temporal)   Resp 16        Lab Results   Component Value Date    CREATININE 0.73 2024    CREATININE 0.73 2024    CREATININE 0.87 2024    CREATININE 0.86 2024        Patient weight is as follows:   Vitals:    24 1756   Weight: 107 kg (235 lb 7.2 oz)       Cultures:  No results found for the encounter in last 14 days.       I/O last 3 completed shifts:  In: 940 (8.8 mL/kg) [P.O.:640; IV Piggyback:300]  Out: - (0 mL/kg)   Weight: 106.8 kg   I/O during current shift:  I/O this shift:  In: 300 [IV Piggyback:300]  Out: -     Temp (24hrs), Av.2 °C (97.1 °F), Min:35.6 °C (96 °F), Max:37.5 °C (99.5 °F)      Assessment/Plan    Below goal AUC. Orders placed for new vancomcyin regimen of 2000 every 12 hours to begin at 24 @ 1800.  Previous level obtained 10 minutes into dose being given.  AUC still showing below goal so dosed increased.  Moved administration time to 0600/1800 and new level to be drawn tomorrow morning at 0500.    This dosing regimen is predicted by [email or web address] to result in the following pharmacokinetic parameters:  Regimen: 2000 mg IV every 12 hours.  Start time: 17:31 on 2024  Exposure target: AUC24 (range)400-600 mg/L.hr   [id number]: 494 mg/L.hr  AUC24,ss: 527 mg/L.hr  Probability of AUC24 > 400: 92 %  Ctrough,ss: 12.9 mg/L  Probability of Ctrough,ss > 20: 6 %    The next level will be obtained on 9/10/24 at 0500. May be  obtained sooner if clinically indicated.   Will continue to monitor renal function daily while on vancomycin and order serum creatinine at least every 48 hours if not already ordered.  Follow for continued vancomycin needs, clinical response, and signs/symptoms of toxicity.       Jesus Cooper, PharmD

## 2024-09-09 NOTE — PROGRESS NOTES
Domo Arcos is a 53 y.o. male on day 1 of admission presenting with Septic arthritis (Multi).    Subjective    He is resting in bed this morning. MRI to be done today. He spoke to Dr Tracy today and will likely proceed with surgery for that foot. We discussed that plan and all questions answered.     Objective     Physical Exam  Constitutional:       General: He is not in acute distress.     Appearance: Normal appearance. He is not toxic-appearing.   HENT:      Head: Normocephalic and atraumatic.      Mouth/Throat:      Mouth: Mucous membranes are moist.   Eyes:      Extraocular Movements: Extraocular movements intact.      Pupils: Pupils are equal, round, and reactive to light.   Cardiovascular:      Rate and Rhythm: Normal rate and regular rhythm.      Pulses: Normal pulses.      Heart sounds: Normal heart sounds. No murmur heard.     No gallop.   Pulmonary:      Effort: Pulmonary effort is normal. No respiratory distress.      Breath sounds: Normal breath sounds. No wheezing, rhonchi or rales.   Abdominal:      General: Bowel sounds are normal. There is no distension.      Palpations: Abdomen is soft.      Tenderness: There is no abdominal tenderness. There is no guarding or rebound.   Musculoskeletal:         General: Deformity (RA joint deformities bilaterally) present. No swelling, tenderness or signs of injury. Normal range of motion.      Cervical back: Normal range of motion and neck supple.   Skin:     General: Skin is warm and dry.      Capillary Refill: Capillary refill takes less than 2 seconds.      Coloration: Skin is not jaundiced.      Findings: Lesion (plantar surface, left foot) present. No bruising or rash.   Neurological:      General: No focal deficit present.      Mental Status: He is alert and oriented to person, place, and time.      Cranial Nerves: No cranial nerve deficit.      Sensory: No sensory deficit.      Motor: No weakness.      Gait: Gait normal.   Psychiatric:         Mood  "and Affect: Mood normal.         Behavior: Behavior normal.         Thought Content: Thought content normal.         Judgment: Judgment normal.     Last Recorded Vitals  Blood pressure 131/84, pulse 71, temperature 35.6 °C (96 °F), temperature source Temporal, resp. rate 16, height 1.676 m (5' 6\"), weight 107 kg (235 lb 7.2 oz), SpO2 94%.  Intake/Output last 3 Shifts:  I/O last 3 completed shifts:  In: 640 (6 mL/kg) [P.O.:640]  Out: - (0 mL/kg)   Weight: 106.8 kg     Scheduled medications  atorvastatin, 40 mg, oral, Daily  cefTRIAXone, 1 g, intravenous, q24h  cholecalciferol, 2,000 Units, oral, Daily  cyanocobalamin, 1,000 mcg, intramuscular, q30 days  enoxaparin, 40 mg, subcutaneous, q24h  escitalopram, 30 mg, oral, Daily  ferrous sulfate (325 mg ferrous sulfate), 1 tablet, oral, Daily with breakfast  folic acid, 1 mg, oral, Daily  leflunomide, 20 mg, oral, Daily  lisinopril, 10 mg, oral, BID  metoprolol tartrate, 50 mg, oral, BID  multivitamin, 1 tablet, oral, Daily  pantoprazole, 40 mg, oral, Daily before breakfast   Or  pantoprazole, 40 mg, intravenous, Daily before breakfast  sennosides-docusate sodium, 1 tablet, oral, BID  thiamine, 100 mg, oral, Daily  tofacitinib ER, 11 mg, oral, Daily  vancomycin, 1,500 mg, intravenous, q12h    Continuous medications  sodium chloride 0.9%, 10 mL/hr, Last Rate: 10 mL/hr (09/09/24 0543)    PRN medications  PRN medications: acetaminophen, acetaminophen, benzocaine-menthol, calcium carbonate, dextromethorphan-guaifenesin, diazePAM, guaiFENesin, melatonin, ondansetron **OR** ondansetron, sodium chloride 0.9%, traMADol, vancomycin    Relevant Results  XR foot left 3+ views  Result Date: 9/7/2024  Multifocal subluxations of the MTP joints as detailed above with severe hallux valgus deformity.   Superimposed subarticular bone destruction at the 2nd MTP joint suspicious for septic arthritis in the appropriate clinical setting.   Diffuse soft tissue swelling of the forefoot.     " MACRO: None   Signed by: Wali Bustamante 9/7/2024 1:22 PM Dictation workstation:   [id number]      Latest Reference Range & Units 09/07/24 15:01 09/08/24 06:02 09/09/24 05:42   GLUCOSE 74 - 99 mg/dL 80 97 107 (H)   SODIUM 136 - 145 mmol/L 137 140 140   POTASSIUM 3.5 - 5.3 mmol/L 4.7 3.8 3.7   CHLORIDE 98 - 107 mmol/L 103 106 110 (H)   Bicarbonate 21 - 32 mmol/L 28 24 24   Anion Gap 10 - 20 mmol/L 11 14 10   Blood Urea Nitrogen 6 - 23 mg/dL 14 15 11   Creatinine 0.50 - 1.30 mg/dL 0.87 0.73 0.73   EGFR >60 mL/min/1.73m*2 >90 >90 >90   Calcium 8.6 - 10.3 mg/dL 8.9 8.1 (L) 7.9 (L)   Albumin 3.4 - 5.0 g/dL 3.6     Alkaline Phosphatase 33 - 120 U/L 85     ALT 10 - 52 U/L 22     AST 9 - 39 U/L 38     Bilirubin Total 0.0 - 1.2 mg/dL 0.5     Total Protein 6.4 - 8.2 g/dL 7.3     Lactate 0.4 - 2.0 mmol/L 1.4     C-Reactive Protein <1.00 mg/dL 3.75 (H)     WBC 4.4 - 11.3 x10*3/uL 12.4 (H) 9.2 10.6   nRBC 0.0 - 0.0 /100 WBCs 0.0 0.0 0.0   RBC 4.50 - 5.90 x10*6/uL 4.44 (L) 3.94 (L) 3.68 (L)   HEMOGLOBIN 13.5 - 17.5 g/dL 12.8 (L) 11.2 (L) 10.6 (L)   HEMATOCRIT 41.0 - 52.0 % 38.7 (L) 34.6 (L) 33.7 (L)   MCV 80 - 100 fL 87 88 92   MCH 26.0 - 34.0 pg 28.8 28.4 28.8   MCHC 32.0 - 36.0 g/dL 33.1 32.4 31.5 (L)   RED CELL DISTRIBUTION WIDTH 11.5 - 14.5 % 13.6 13.6 13.6   Platelets 150 - 450 x10*3/uL 393 239 234     Assessment/Plan   Assessment & Plan  Septic arthritis (Multi)    Anemia    Rheumatoid arthritis (Multi)    Depression    Hypertension    Vitamin D deficiency    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 Superimposed subarticular bone destruction at the 2nd MTP joint suspicious for  septic arthritis in the appropriate clinical setting. Diffuse soft tissue swelling of the forefoot.  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)  -continue Arava 20 mg PO Daily   -continue Xeljanz 11 mg PO Daily      #Depression  -continue Lexapro 30 mg PO Daily      #Hypertension  -Bps stable since admission  -continue Lisinopril 10 mg PO BID  -continue Lopressor 50 mg PO BID     #Vitamin D deficiency  -continue Vitamin D 2,000 units PO Daily      #Uncomplicated alcohol dependence (Multi)  - [facility] Protocol   - Folic Acid 1 mg PO Daily  - Multivitamin Daily   - Vitamin B-1 100 mg PO Daily      #Hyperlipidemia  - , VLDL 46, trig 232, chol 217 (10/4/2023)  - continue Lipitor 40 mg PO Daily      DVT Prophylaxis: Lovenox   Fluids: N/A  Nutrition: Cardiac    Code Status: Full Code   Pt requires inpatient stay at this time.    KRISTINE Mackey-CNP

## 2024-09-09 NOTE — CARE PLAN
The patient's goals for the shift include  Pt will tolerate IV antibiotics throughout shift.     The clinical goals for the shift include Remian pain free    Pt remained safe and stable throughout shift. VS were stable and pt denied pain. IV abx were administered and pt tolerated well. No other complaints. Pt resting in bed with call light within reach.

## 2024-09-09 NOTE — PROGRESS NOTES
09/09/24 1100   Discharge Planning   Living Arrangements Spouse/significant other   Support Systems Spouse/significant other;Friends/neighbors   Assistance Needed independent   Type of Residence Private residence   Home or Post Acute Services None   Expected Discharge Disposition Home   Does the patient need discharge transport arranged? No     Met with patient at bedside to discuss discharge planning, Patient states he is independent at home, patient drives, works, PT/OT rec No Needs. Plan for vascular studies, Surgery tomorrow or Wednesday for rheumatoid reconstruction forefoot procedure. Current plan home with no needs.

## 2024-09-09 NOTE — PROGRESS NOTES
Physical Therapy                 Therapy Communication Note    Patient Name: Domo Arcos  MRN: [id number]  Today's Date: 9/9/2024     Discipline: Physical Therapy    PT order received; chart reviewed. Observed pt. Amb. In hallways IND. Discussed potential use of assistive device to offload foot but pt. declined. Pt. Is waiting to hear if he is going to need sx or not. Will discontinue PT at this time. May benefit from PT if pt. does have surgery/change in WB status.

## 2024-09-10 ENCOUNTER — ANESTHESIA (OUTPATIENT)
Dept: OPERATING ROOM | Facility: HOSPITAL | Age: 54
End: 2024-09-10
Payer: COMMERCIAL

## 2024-09-10 ENCOUNTER — ANESTHESIA EVENT (OUTPATIENT)
Dept: OPERATING ROOM | Facility: HOSPITAL | Age: 54
End: 2024-09-10
Payer: COMMERCIAL

## 2024-09-10 ENCOUNTER — APPOINTMENT (OUTPATIENT)
Dept: RADIOLOGY | Facility: HOSPITAL | Age: 54
DRG: 501 | End: 2024-09-10
Payer: COMMERCIAL

## 2024-09-10 LAB
ANION GAP SERPL CALC-SCNC: 10 MMOL/L (ref 10–20)
BUN SERPL-MCNC: 8 MG/DL (ref 6–23)
CALCIUM SERPL-MCNC: 8.6 MG/DL (ref 8.6–10.3)
CHLORIDE SERPL-SCNC: 107 MMOL/L (ref 98–107)
CO2 SERPL-SCNC: 26 MMOL/L (ref 21–32)
CREAT SERPL-MCNC: 0.71 MG/DL (ref 0.5–1.3)
EGFRCR SERPLBLD CKD-EPI 2021: >90 ML/MIN/1.73M*2
ERYTHROCYTE [DISTWIDTH] IN BLOOD BY AUTOMATED COUNT: 13.6 % (ref 11.5–14.5)
GLUCOSE SERPL-MCNC: 92 MG/DL (ref 74–99)
HCT VFR BLD AUTO: 35.9 % (ref 41–52)
HGB BLD-MCNC: 11.5 G/DL (ref 13.5–17.5)
MCH RBC QN AUTO: 28.9 PG (ref 26–34)
MCHC RBC AUTO-ENTMCNC: 32 G/DL (ref 32–36)
MCV RBC AUTO: 90 FL (ref 80–100)
NRBC BLD-RTO: 0 /100 WBCS (ref 0–0)
PLATELET # BLD AUTO: 252 X10*3/UL (ref 150–450)
POTASSIUM SERPL-SCNC: 4.4 MMOL/L (ref 3.5–5.3)
RBC # BLD AUTO: 3.98 X10*6/UL (ref 4.5–5.9)
SODIUM SERPL-SCNC: 139 MMOL/L (ref 136–145)
VANCOMYCIN SERPL-MCNC: 13.5 UG/ML (ref 5–20)
WBC # BLD AUTO: 11.5 X10*3/UL (ref 4.4–11.3)

## 2024-09-10 PROCEDURE — C1713 ANCHOR/SCREW BN/BN,TIS/BN: HCPCS | Mod: IPSPLIT | Performed by: PODIATRIST

## 2024-09-10 PROCEDURE — 2780000003 HC OR 278 NO HCPCS: Mod: IPSPLIT | Performed by: PODIATRIST

## 2024-09-10 PROCEDURE — 87205 SMEAR GRAM STAIN: CPT | Mod: GENLAB | Performed by: INTERNAL MEDICINE

## 2024-09-10 PROCEDURE — 80202 ASSAY OF VANCOMYCIN: CPT | Mod: IPSPLIT | Performed by: NURSE PRACTITIONER

## 2024-09-10 PROCEDURE — 2500000001 HC RX 250 WO HCPCS SELF ADMINISTERED DRUGS (ALT 637 FOR MEDICARE OP): Mod: IPSPLIT

## 2024-09-10 PROCEDURE — 99232 SBSQ HOSP IP/OBS MODERATE 35: CPT | Performed by: NURSE PRACTITIONER

## 2024-09-10 PROCEDURE — 87081 CULTURE SCREEN ONLY: CPT | Mod: GENLAB | Performed by: PHYSICIAN ASSISTANT

## 2024-09-10 PROCEDURE — 2500000001 HC RX 250 WO HCPCS SELF ADMINISTERED DRUGS (ALT 637 FOR MEDICARE OP): Mod: IPSPLIT | Performed by: PODIATRIST

## 2024-09-10 PROCEDURE — 0QBP0ZZ EXCISION OF LEFT METATARSAL, OPEN APPROACH: ICD-10-PCS | Performed by: PODIATRIST

## 2024-09-10 PROCEDURE — 2500000004 HC RX 250 GENERAL PHARMACY W/ HCPCS (ALT 636 FOR OP/ED): Mod: JZ,IPSPLIT | Performed by: STUDENT IN AN ORGANIZED HEALTH CARE EDUCATION/TRAINING PROGRAM

## 2024-09-10 PROCEDURE — 2500000005 HC RX 250 GENERAL PHARMACY W/O HCPCS: Mod: IPSPLIT | Performed by: NURSE ANESTHETIST, CERTIFIED REGISTERED

## 2024-09-10 PROCEDURE — 94760 N-INVAS EAR/PLS OXIMETRY 1: CPT | Mod: IPSPLIT

## 2024-09-10 PROCEDURE — 2500000004 HC RX 250 GENERAL PHARMACY W/ HCPCS (ALT 636 FOR OP/ED): Mod: IPSPLIT | Performed by: NURSE ANESTHETIST, CERTIFIED REGISTERED

## 2024-09-10 PROCEDURE — 2500000004 HC RX 250 GENERAL PHARMACY W/ HCPCS (ALT 636 FOR OP/ED): Mod: IPSPLIT | Performed by: PODIATRIST

## 2024-09-10 PROCEDURE — 2500000005 HC RX 250 GENERAL PHARMACY W/O HCPCS: Mod: IPSPLIT | Performed by: PODIATRIST

## 2024-09-10 PROCEDURE — 7100000001 HC RECOVERY ROOM TIME - INITIAL BASE CHARGE: Mod: IPSPLIT | Performed by: PODIATRIST

## 2024-09-10 PROCEDURE — 0LNW0ZZ RELEASE LEFT FOOT TENDON, OPEN APPROACH: ICD-10-PCS | Performed by: PODIATRIST

## 2024-09-10 PROCEDURE — 0752T DGTZ GLS MCRSCP SLD LVL III: CPT | Mod: TC,GENLAB,IPSPLIT | Performed by: INTERNAL MEDICINE

## 2024-09-10 PROCEDURE — 2720000007 HC OR 272 NO HCPCS: Mod: IPSPLIT | Performed by: PODIATRIST

## 2024-09-10 PROCEDURE — 80048 BASIC METABOLIC PNL TOTAL CA: CPT | Mod: IPSPLIT | Performed by: NURSE PRACTITIONER

## 2024-09-10 PROCEDURE — 85027 COMPLETE CBC AUTOMATED: CPT | Mod: IPSPLIT | Performed by: NURSE PRACTITIONER

## 2024-09-10 PROCEDURE — 76000 FLUOROSCOPY <1 HR PHYS/QHP: CPT | Mod: IPSPLIT

## 2024-09-10 PROCEDURE — 3600000008 HC OR TIME - EACH INCREMENTAL 1 MINUTE - PROCEDURE LEVEL THREE: Mod: IPSPLIT | Performed by: PODIATRIST

## 2024-09-10 PROCEDURE — 36415 COLL VENOUS BLD VENIPUNCTURE: CPT | Mod: IPSPLIT | Performed by: NURSE PRACTITIONER

## 2024-09-10 PROCEDURE — 2500000004 HC RX 250 GENERAL PHARMACY W/ HCPCS (ALT 636 FOR OP/ED): Mod: IPSPLIT | Performed by: NURSE PRACTITIONER

## 2024-09-10 PROCEDURE — 3600000003 HC OR TIME - INITIAL BASE CHARGE - PROCEDURE LEVEL THREE: Mod: IPSPLIT | Performed by: PODIATRIST

## 2024-09-10 PROCEDURE — 3700000001 HC GENERAL ANESTHESIA TIME - INITIAL BASE CHARGE: Mod: IPSPLIT | Performed by: PODIATRIST

## 2024-09-10 PROCEDURE — 1100000001 HC PRIVATE ROOM DAILY: Mod: IPSPLIT

## 2024-09-10 PROCEDURE — 0SGQ0KZ FUSION OF LEFT TOE PHALANGEAL JOINT WITH NONAUTOLOGOUS TISSUE SUBSTITUTE, OPEN APPROACH: ICD-10-PCS | Performed by: PODIATRIST

## 2024-09-10 PROCEDURE — 7100000002 HC RECOVERY ROOM TIME - EACH INCREMENTAL 1 MINUTE: Mod: IPSPLIT | Performed by: PODIATRIST

## 2024-09-10 PROCEDURE — 93922 UPR/L XTREMITY ART 2 LEVELS: CPT | Performed by: STUDENT IN AN ORGANIZED HEALTH CARE EDUCATION/TRAINING PROGRAM

## 2024-09-10 PROCEDURE — 3700000002 HC GENERAL ANESTHESIA TIME - EACH INCREMENTAL 1 MINUTE: Mod: IPSPLIT | Performed by: PODIATRIST

## 2024-09-10 DEVICE — IMPLANTABLE DEVICE: Type: IMPLANTABLE DEVICE | Site: FOOT | Status: FUNCTIONAL

## 2024-09-10 RX ORDER — PROPOFOL 10 MG/ML
INJECTION, EMULSION INTRAVENOUS AS NEEDED
Status: DISCONTINUED | OUTPATIENT
Start: 2024-09-10 | End: 2024-09-10

## 2024-09-10 RX ORDER — SODIUM CHLORIDE, SODIUM LACTATE, POTASSIUM CHLORIDE, CALCIUM CHLORIDE 600; 310; 30; 20 MG/100ML; MG/100ML; MG/100ML; MG/100ML
INJECTION, SOLUTION INTRAVENOUS
Status: DISPENSED
Start: 2024-09-10 | End: 2024-09-10

## 2024-09-10 RX ORDER — OXYCODONE AND ACETAMINOPHEN 5; 325 MG/1; MG/1
1.5 TABLET ORAL EVERY 4 HOURS PRN
Status: DISCONTINUED | OUTPATIENT
Start: 2024-09-10 | End: 2024-09-11 | Stop reason: HOSPADM

## 2024-09-10 RX ORDER — NORETHINDRONE AND ETHINYL ESTRADIOL 0.5-0.035
KIT ORAL AS NEEDED
Status: DISCONTINUED | OUTPATIENT
Start: 2024-09-10 | End: 2024-09-10

## 2024-09-10 RX ORDER — BUPIVACAINE HCL/EPINEPHRINE 0.25-.0005
VIAL (ML) INJECTION AS NEEDED
Status: DISCONTINUED | OUTPATIENT
Start: 2024-09-10 | End: 2024-09-10 | Stop reason: HOSPADM

## 2024-09-10 RX ORDER — LIDOCAINE HYDROCHLORIDE 20 MG/ML
INJECTION, SOLUTION INFILTRATION; PERINEURAL AS NEEDED
Status: DISCONTINUED | OUTPATIENT
Start: 2024-09-10 | End: 2024-09-10

## 2024-09-10 RX ORDER — MIDAZOLAM HYDROCHLORIDE 1 MG/ML
INJECTION INTRAMUSCULAR; INTRAVENOUS AS NEEDED
Status: DISCONTINUED | OUTPATIENT
Start: 2024-09-10 | End: 2024-09-10

## 2024-09-10 RX ORDER — FENTANYL CITRATE 50 UG/ML
INJECTION, SOLUTION INTRAMUSCULAR; INTRAVENOUS AS NEEDED
Status: DISCONTINUED | OUTPATIENT
Start: 2024-09-10 | End: 2024-09-10

## 2024-09-10 SDOH — HEALTH STABILITY: MENTAL HEALTH: CURRENT SMOKER: 1

## 2024-09-10 ASSESSMENT — LIFESTYLE VARIABLES
NAUSEA AND VOMITING: NO NAUSEA AND NO VOMITING
TOTAL SCORE: 1
PAROXYSMAL SWEATS: BARELY PERCEPTIBLE SWEATING, PALMS MOIST
TREMOR: NO TREMOR
VISUAL DISTURBANCES: NOT PRESENT
ANXIETY: NO ANXIETY, AT EASE
HEADACHE, FULLNESS IN HEAD: NOT PRESENT
AGITATION: NORMAL ACTIVITY
ORIENTATION AND CLOUDING OF SENSORIUM: ORIENTED AND CAN DO SERIAL ADDITIONS
AUDITORY DISTURBANCES: NOT PRESENT

## 2024-09-10 ASSESSMENT — PAIN - FUNCTIONAL ASSESSMENT
PAIN_FUNCTIONAL_ASSESSMENT: 0-10

## 2024-09-10 ASSESSMENT — PAIN SCALES - GENERAL
PAINLEVEL_OUTOF10: 3
PAINLEVEL_OUTOF10: 0 - NO PAIN
PAINLEVEL_OUTOF10: 4
PAINLEVEL_OUTOF10: 0 - NO PAIN
PAINLEVEL_OUTOF10: 6
PAIN_LEVEL: 0
PAINLEVEL_OUTOF10: 0 - NO PAIN

## 2024-09-10 NOTE — PROGRESS NOTES
Vancomycin Dosing by Pharmacy- FOLLOW UP    Domo Arcos is a 53 y.o. year old male who Pharmacy has been consulted for vancomycin dosing for osteomyelitis/septic arthritis. Based on the patient's indication and renal status this patient is being dosed based on a goal AUC of 400-600.     Renal function is currently stable.    Current vancomycin dose: 2000 mg given every 12 hours    Estimated vancomycin AUC on current dose: 578 mg/L.hr     Visit Vitals  /77 (BP Location: Left arm, Patient Position: Lying)   Pulse 63   Temp 36 °C (96.8 °F) (Temporal)   Resp 18        Lab Results   Component Value Date    CREATININE 0.71 09/10/2024    CREATININE 0.73 2024    CREATININE 0.73 2024    CREATININE 0.87 2024        Patient weight is as follows:   Vitals:    24 1756   Weight: 107 kg (235 lb 7.2 oz)       Cultures:  No results found for the encounter in last 14 days.       I/O last 3 completed shifts:  In: 893.7 (8.4 mL/kg) [I.V.:193.7 (1.8 mL/kg); IV Piggyback:700]  Out: - (0 mL/kg)   Weight: 106.8 kg   I/O during current shift:  No intake/output data recorded.    Temp (24hrs), Av.2 °C (97.2 °F), Min:35.6 °C (96.1 °F), Max:36.8 °C (98.3 °F)      Assessment/Plan    Within goal AUC range. Continue current vancomycin regimen.    This dosing regimen is predicted by [employer] to result in the following pharmacokinetic parameters:  Loading dose: N/A  Regimen: 2000 mg IV every 12 hours.  Start time: 18:21 on 09/10/2024  Exposure target: AUC24 (range)400-600 mg/L.hr   [id number]: 555 mg/L.hr  AUC24,ss: 578 mg/L.hr  Probability of AUC24 > 400: 97 %  Ctrough,ss: 14.9 mg/L  Probability of Ctrough,ss > 20: 14 %    The next level will be obtained on 24 at 0500. May be obtained sooner if clinically indicated.   Will continue to monitor renal function daily while on vancomycin and order serum creatinine at least every 48 hours if not already ordered.  Follow for continued vancomycin needs, clinical  response, and signs/symptoms of toxicity.       Shala Lee, PharmD

## 2024-09-10 NOTE — CARE PLAN
The patient's goals for the shift include  Pt will have controlled pain throughout shift.     The clinical goals for the shift include Pt will tolerate surgery today.    Patient remained safe and stable throughout shift. VS were stable and pt received tramadol for pain. Pt currently off floor for surgery.

## 2024-09-10 NOTE — ANESTHESIA PROCEDURE NOTES
Peripheral Block    Start time: 9/10/2024 5:09 PM  End time: 9/10/2024 5:12 PM  Reason for block: at surgeon's request and post-op pain management  Staffing  Performed: CRNA   Authorized by: VERONIQUE Nettles    Performed by: VERONIQUE Nettles  Preanesthetic Checklist  Completed: patient identified, IV checked, site marked, risks and benefits discussed, surgical consent, monitors and equipment checked, pre-op evaluation and timeout performed   Timeout performed at: 9/10/2024 5:03 PM  Peripheral Block  Patient position: right lateral decubitus  Prep: ChloraPrep  Patient monitoring: heart rate, cardiac monitor and continuous pulse ox  Block type: popliteal  Laterality: left  Injection technique: single-shot  Guidance: ultrasound guided  Local infiltration: ropivacaine  Infiltration strength: 0.5 %  Dose: 20 mL  Needle  Needle type: stimulator.   Needle gauge: 22 G  Needle length: 5 cm  Needle localization: nerve stimulator, ultrasound guidance and anatomical landmarks  Assessment  Injection assessment: negative aspiration for heme, no paresthesia on injection, incremental injection and local visualized surrounding nerve on ultrasound  Heart rate change: no  Slow fractionated injection: yes

## 2024-09-10 NOTE — PROGRESS NOTES
09/10/24 1210   Discharge Planning   Assistance Needed A & O x 3; lives with his wife Yoli; Independent   Home or Post Acute Services None   Expected Discharge Disposition Home   Does the patient need discharge transport arranged? No     Patient scheduled for surgery today.  PT/OT will assess after surgery of his foot.  TCC following for discharge planning changes.

## 2024-09-10 NOTE — OP NOTE
Arthrodesis Foot (L), Excision Bone Foot (L) Operative Note     Date: 2024 - 9/10/2024  OR Location: [facility]    Name: Domo Arcos, : 1970, Age: 53 y.o., MRN: [id number], Sex: male    Diagnosis  Pre-op Diagnosis      * Septic arthritis of left foot, due to unspecified organism (Multi) [M00.9] Post-op Diagnosis     * Septic arthritis of left foot, due to unspecified organism (Multi) [M00.9]     Procedures  Arthrodesis Foot  07555 - [facility] ARTHRODESIS GREAT TOE METATARSOPHALANGEAL JOINT  52850 left second metatarsal bone biopsy  28122 x 4 metatarsal head resections left second, third, fourth and fifth metatarsal head  5738 full-thickness myofascial flap closure of plantar wound  280 2 open flexor tenotomy left second digit  86456 extensor tenotomy second and fifth digit open        Surgeons      * Rigo Tracy - Primary    Resident/Fellow/Other Assistant:  Surgeons and Role: Vik Diaz DPM4  * No surgeons found with a matching role *    Procedure Summary  Anesthesia: General Regional block ASA: II  Anesthesia Staff: CRNA: KRISTINE Nettles-CRNA  Estimated Blood Loss: 25-30 mL  Intra-op Medications:   Administrations occurring from 1600 to 1855 on 09/10/24:   Medication Name Total Dose   acetaminophen (Tylenol) tablet 650 mg Cannot be calculated   acetaminophen (Tylenol) tablet 650 mg Cannot be calculated   atorvastatin (Lipitor) tablet 40 mg Cannot be calculated   benzocaine-menthol (Cepastat Sore Throat) lozenge 1 lozenge Cannot be calculated   calcium carbonate (Tums) chewable tablet 500 mg Cannot be calculated   cefTRIAXone (Rocephin) 1 g in dextrose (iso) IV 50 mL Cannot be calculated   cholecalciferol (Vitamin D-3) tablet 2,000 Units Cannot be calculated   cyanocobalamin (Vitamin B-12) injection 1,000 mcg Cannot be calculated   dextromethorphan-guaifenesin (Robitussin DM)  mg/5 mL oral liquid 5 mL Cannot be calculated   diazePAM (Valium) tablet 5 mg Cannot be calculated   enoxaparin  (Lovenox) syringe 40 mg Cannot be calculated   escitalopram (Lexapro) tablet 30 mg Cannot be calculated   ferrous sulfate (325 mg ferrous sulfate) tablet 325 mg Cannot be calculated   folic acid (Folvite) tablet 1 mg Cannot be calculated   guaiFENesin (Mucinex) 12 hr tablet 600 mg Cannot be calculated   lisinopril tablet 10 mg Cannot be calculated   melatonin tablet 5 mg Cannot be calculated   metoprolol tartrate (Lopressor) tablet 50 mg Cannot be calculated   sennosides-docusate sodium (Kimmy-Colace) 8.6-50 mg per tablet 1 tablet Cannot be calculated   thiamine (Vitamin B-1) tablet 100 mg Cannot be calculated   traMADol (Ultram) tablet 50 mg Cannot be calculated   vancomycin (Vancocin) pharmacy to dose - pharmacy monitoring Cannot be calculated   vancomycin (Xellia) 2 g in diluent combination  mL Cannot be calculated              Anesthesia Record               Intraprocedure I/O Totals          Intake    Propofol Drip 0.00 mL    The total shown is the total volume documented since Anesthesia Start was filed.    Total Intake 0 mL          Specimen:   ID Type Source Tests Collected by Time   1 : Second metatarsal head Tissue BONE RESECTION SURGICAL PATHOLOGY EXAM Rigo Tracy DPM 9/10/2024 1825   A : Second Metatarsal Head Tissue BONE RESECTION TISSUE/WOUND CULTURE/SMEAR Rigo Tracy DPM 9/10/2024 1828        Staff:   Circulator: Esperanza Hassan Person: Meme EARLY #7 drain none in log *    Tourniquet Times: Left tourniquet 250 mm at 53 minutes    Total Tourniquet Time Documented:         Implants: PCR carbon fiber plate with locking screws 3.0 mm and 3.5 mm ConMed  Implants       Type Name Action Serial No.      Implant KIT, BONE MARROW ASPIRATION AND PREP ACCESSORIES - [id number] Implanted      Implant STAPLE, NEOSPAN, COMPRESSION, [id number], W/INSTRUMENTS, STERILE - [id number] Implanted      Screw Dense Bone Sterile Single Use Instrument for 3.0 and 3.5 screws Implanted      Plate CoLink PCR  MTR Plate NX Narrow 4* DF, 6-Hole, Left Implanted      Plate Plate Screw 3.0 x 14mm, Locking Implanted       Plate Screw 3.0 x 12mm, Locking Implanted      Screw Plate SCrew, 3.5 x 20mm, locking Implanted      Screw Plate Screw 3.5 x 18mm, locking Implanted      Screw Plate SCrew 3.0 x 12 Implanted               Findings: Severe rheumatoid arthritis with deformity with potential destruction of second metatarsal head possible osteomyelitis severe deformities and dislocations of all metatarsal phalangeal joints and hammertoe deformities    Indications: Domo Arcos is an 53 y.o. male who is having surgery for Septic arthritis of left foot, due to unspecified organism (Multi) [M00.9].  Occasion risk and benefit of surgery is no guarantees outcomes of surgery.  Patient is at risk of amputation he understands this risk of surgery include pain, infection, numbness, tingling, burning, deformity, osteomyelitis, DVT, PE, chronic regional pain syndrome or CRPS, RSD, amputation or death.  Walking difficulty.    The patient was seen in the preoperative area. The risks, benefits, complications, treatment options, non-operative alternatives, expected recovery and outcomes were discussed with the patient. The possibilities of reaction to medication, pulmonary aspiration, injury to surrounding structures, bleeding, recurrent infection, the need for additional procedures, failure to diagnose a condition, and creating a complication requiring transfusion or operation were discussed with the patient. The patient concurred with the proposed plan, giving informed consent.  The site of surgery was properly noted/marked if necessary per policy. The patient has been actively warmed in preoperative area. Preoperative antibiotics have been ordered and given within 1 hours of incision. Venous thrombosis prophylaxis have been ordered including unilateral sequential compression device    Procedure Details: Patient seen in preop holding.   Consent signed.  H&P completed.  IV antibiotics delivered.  Extremity was marked all consent signs forms were signed.  Patient received regional block.    Patient brought to operative table.  Timeout performed.  Patient placed under general anesthesia.  Patient timeout performed again ankle tourniquet applied sterilely prepped and draped from the toes to the ankle tourniquet utilizing Hibiclens solution.    We exsanguinated leg let the tourniquet up after sterilely prepping and draping in normal fashion manner.    Attention was placed to the first metatarsal dislocated phalangeal joint.  A large medial bump and soft tissue mass was noted.  Semielliptical incision was created.  This measured approximately 12 to 14 cm x 2-1/2 to 3 cm removing the adventitial bursa and soft tissue mass.  We incise directly down to the bone.  Reflected the capsular tissue and it was noted that the joint was severely destroyed from rheumatoid arthritis.  The joint was dislocated we did flat cut with the base medially apex laterally removing the cartilaginous surfaces that was remaining and made this into a flat cut.  We did an extensor tenotomy of the first EHL tendon to bring the toe in a more neutral position.  We now fenestrated with a 2 oh drill bit then we used [employer] graft and before we started surgery we took 6 cc of PRP from the calcaneus.  B MAC was utilized.  We placed this into the Osorio bone graft and use this into our first metatarsal phalangeal joint when we swung it into a more neutral position.    We are able to stabilize this with a 18 mm nitinol ConMed staple from medial to lateral.  Then using the PCR 6-hole plate were able to secure the great toe joint in a more neutral position and securing this with locking and nonlocking 3.0 and 3.5 cannulated screws measuring 12 to 18 mm.  The joint was stable did not move and it was good position C-arm pictures confirmed this.      A semielliptical 16 x 5 cm  wedge of tissue was created underneath the metatarsal head and underneath the sulcus from the metatarsal heads to the 5.  15 blade was used to dissect.  We able to dissect this down to the plantar plate level.  We now were able to mobilize the skin flap so we have redundancy.  We now were able to isolate each of the metatarsal heads by reflecting the flexor tendons sharp dissecting down the metatarsal shaft and seen the metatarsal heads.  It was noted that the metatarsal head #2 had significant destruction and was soft and we resected this with a sagittal saw blade protecting the flexor tendons and neurovascular structures.  Remove the second metatarsal head and sent half to microbiology half the pathology.  There was no purulence or necrosis noted.  We now did the cascade resection of the metatarsal heads 3 4 and 5 using a freer elevator so that we can keep our length and parabola in good position and we resected the third, fourth and fifth metatarsal heads appropriately.  We now able to use a rongeur and rasp to smooth out the plantar surface of the sharp point of the metatarsal heads we then irrigated with 2 L of saline solution.    We used injectable a 0.5% Marcaine with epinephrine approximately 20 cc distributed on the plantar aspect of the foot as well as the first metatarsal head region and midshaft region.  Compression was applied with wet laps with saline and utilized an Esmarch.  This was held for approximately 4 minutes took the tourniquet down while this was happening removed the dressings and there was very minimal bleeding and no active profuse of bleeding.  We used a Bovie.  We now closed over a SHARATH #7 drain placed in the drain plantarly and exited dorsally through the first metatarsal phalangeal joint region incision site.  We closed the deep capsule tissue utilizing 2-0 Prolene suture and closed the skin advancing the myofascial flap full structure to close the plantar wound and take the fat pad and  soft tissue structures to cover over the resection metatarsal head regions.  This was done with 2-0 Vicryl suture deep and 2-0 Prolene suture full-thickness.  We did horizontal mattress as well as box stitches and simple interrupted suture.    We are able to do an open flexor tenotomy plantarly on the second toe and over flexing tenotomy's of the extensor digits 2 and 5.  These were closed with 3-0 Prolene suture loaded the foot in the toes and foot looks much better and more rectus position.  The toes are pink and healthy with immediate capillary refill time.  And now we are able to wash the foot and place a well protected padded dressing.    Patient tolerated procedure anesthesia well.  Vital signs stable.  Neurovascular intact.Patient be discharged to PACU then back to floor and then hopefully discharge tomorrow.  Patient has to stay nonweightbearing.  Patient have stitches in for about 4 weeks.  Patient will be on crutches.  Complications:  None; patient tolerated the procedure well.    Disposition: PACU - hemodynamically stable.  Condition: stable         Additional Details: Patient hopefully be discharged tomorrow nonweightbearing splint soft dressing surgical shoe crutches p.o. pain meds antibiotics and baby aspirin for anticoagulation.    Attending Attestation:     Rigo Tracy  Phone Number: [phone]

## 2024-09-10 NOTE — ANESTHESIA PROCEDURE NOTES
Peripheral Block    Start time: 9/10/2024 5:13 PM  End time: 9/10/2024 5:18 PM  Reason for block: at surgeon's request and post-op pain management  Staffing  Performed: CRNA   Authorized by: VERONIQUE Nettles    Performed by: VERONIQUE Nettles  Preanesthetic Checklist  Completed: patient identified, IV checked, site marked, risks and benefits discussed, surgical consent, monitors and equipment checked, pre-op evaluation and timeout performed   Timeout performed at: 9/10/2024 5:03 PM  Peripheral Block  Patient position: laying flat  Prep: ChloraPrep  Patient monitoring: heart rate, cardiac monitor and continuous pulse ox  Block type: adductor canal  Injection technique: single-shot  Guidance: ultrasound guided  Local infiltration: ropivacaine  Infiltration strength: 0.5 %  Dose: 20 mL  Needle  Needle type: stimulator.   Needle length: 5 cm  Needle localization: anatomical landmarks and ultrasound guidance  Assessment  Injection assessment: negative aspiration for heme, no paresthesia on injection, incremental injection and local visualized surrounding nerve on ultrasound  Heart rate change: no  Slow fractionated injection: yes

## 2024-09-10 NOTE — ANESTHESIA PREPROCEDURE EVALUATION
Patient: Domo Arcos    Procedure Information       Date/Time: 09/10/24 1600    Procedures:       Arthrodesis Foot (Left)      Excision Bone Foot (Left)    Location: [facility] / [facility]    Surgeons: Rigo Tracy DPM            Relevant Problems   Anesthesia (within normal limits)      Cardiac   (+) Hypertension      Pulmonary (within normal limits)      Neuro   (+) Anxiety   (+) Depression   (+) Radiculopathy      GI (within normal limits)      /Renal (within normal limits)      Liver (within normal limits)      Endocrine   (+) Obesity   (+) Thyroid nodule   (+) Vitamin D deficiency      Hematology   (+) Anemia   (+) Iron deficiency anemia   (+) Pernicious anemia      Musculoskeletal   (+) Arthritis   (+) Rheumatoid arthritis (Multi)      HEENT   (+) Mixed hearing loss, bilateral   (+) Sinus drainage      ID   (+) Septic arthritis of left foot, due to unspecified organism (Multi)   (+) Surgical site infection      Skin   (+) Rash      GYN (within normal limits)      ENT   (+) Allergic rhinitis      Tobacco   (+) Cigarette nicotine dependence without complication      Cardiac and Vasculature   (+) Dyslipidemia      Hematology and Neoplasia   (+) Long term (current) use of unspecified immunomodulators and immunosuppressants      Mental Health   (+) Uncomplicated alcohol dependence (Multi)     Vitals:    09/10/24 0802   BP: 126/77   Pulse: 63   Resp: 18   Temp: 36 °C (96.8 °F)   SpO2: 96%       Past Surgical History:   Procedure Laterality Date    APPENDECTOMY  03/01/2017    Appendectomy    OTHER SURGICAL HISTORY Right 01/13/2019    Elbow surgery    SPINE SURGERY      Cyst removed from spine     Past Medical History:   Diagnosis Date    Anemia     Arthritis     Depression     Hypertension     Personal history of other diseases of the musculoskeletal system and connective tissue     History of backache    Radiculopathy, cervical region 02/14/2022    Cervical radiculopathy, acute    Rash and other  nonspecific skin eruption 12/07/2022    Rash       Current Facility-Administered Medications:     acetaminophen (Tylenol) tablet 650 mg, 650 mg, oral, q4h PRN, REZA Mackey    acetaminophen (Tylenol) tablet 650 mg, 650 mg, oral, q4h PRN, REZA Mackey    atorvastatin (Lipitor) tablet 40 mg, 40 mg, oral, Daily, REZA Perry, 40 mg at 09/09/24 0805    benzocaine-menthol (Cepastat Sore Throat) lozenge 1 lozenge, 1 lozenge, Mouth/Throat, q2h PRN, REZA Mackey    calcium carbonate (Tums) chewable tablet 500 mg, 500 mg, oral, 4x daily PRN, REZA Mackey    cefTRIAXone (Rocephin) 1 g in dextrose (iso) IV 50 mL, 1 g, intravenous, q24h, REZA Mackey, Stopped at 09/09/24 1708    cholecalciferol (Vitamin D-3) tablet 2,000 Units, 2,000 Units, oral, Daily, REZA Perry, 2,000 Units at 09/09/24 0532    cyanocobalamin (Vitamin B-12) injection 1,000 mcg, 1,000 mcg, intramuscular, q30 days, REZA Perry    dextromethorphan-guaifenesin (Robitussin DM)  mg/5 mL oral liquid 5 mL, 5 mL, oral, q4h PRN, REZA Mackey    diazePAM (Valium) tablet 5 mg, 5 mg, oral, q2h PRN, REZA Perry    enoxaparin (Lovenox) syringe 40 mg, 40 mg, subcutaneous, q24h, REZA Mackey, 40 mg at 09/09/24 1848    escitalopram (Lexapro) tablet 30 mg, 30 mg, oral, Daily, REZA Perry, 30 mg at 09/09/24 0804    ferrous sulfate (325 mg ferrous sulfate) tablet 325 mg, 1 tablet, oral, Daily with breakfast, REZA Perry, 325 mg at 09/09/24 0804    folic acid (Folvite) tablet 1 mg, 1 mg, oral, Daily, REZA Perry, 1 mg at 09/09/24 0805    guaiFENesin (Mucinex) 12 hr tablet 600 mg, 600 mg, oral, q12h PRN, KRISTINE Mackey-CNP    lactated Ringer's infusion  - Omnicell Override Pull, , , ,     leflunomide (Arava) tablet 20 mg, 20 mg, oral, Daily, REZA Perry, 20 mg  at 09/09/24 0902    lisinopril tablet 10 mg, 10 mg, oral, BID, KRISTINE Perry-CNP, 10 mg at 09/09/24 2019    melatonin tablet 5 mg, 5 mg, oral, Nightly PRN, KRISTINE Mackey-RACHEL    metoprolol tartrate (Lopressor) tablet 50 mg, 50 mg, oral, BID, KRISTINE Perry-CNP, 50 mg at 09/09/24 2019    multivitamin 1 tablet, 1 tablet, oral, Daily, KRISTINE Perry-CNP, 1 tablet at 09/09/24 0805    ondansetron (Zofran) tablet 4 mg, 4 mg, oral, q8h PRN **OR** ondansetron (Zofran) injection 4 mg, 4 mg, intravenous, q8h PRN, KRISTINE Mackey-CNP    pantoprazole (ProtoNix) EC tablet 40 mg, 40 mg, oral, Daily before breakfast, 40 mg at 09/09/24 0532 **OR** pantoprazole (ProtoNix) injection 40 mg, 40 mg, intravenous, Daily before breakfast, KRISTINE Mackey-CNP, 40 mg at 09/10/24 0620    sennosides-docusate sodium (Kimmy-Colace) 8.6-50 mg per tablet 1 tablet, 1 tablet, oral, BID, KRISTINE Mackey-CNP, 1 tablet at 09/09/24 0805    sodium chloride 0.9% infusion, 10 mL/hr, intravenous, Continuous PRN, Gabbi Payne MD, Last Rate: 10 mL/hr at 09/10/24 0105, 10 mL/hr at 09/10/24 0105    thiamine (Vitamin B-1) tablet 100 mg, 100 mg, oral, Daily, REZA Perry, 100 mg at 09/09/24 0807    tofacitinib ER (Xeljanz XR) tablet 11 mg, 11 mg, oral, Daily, KRISTINE Perry-CNP, 11 mg at 09/09/24 0809    traMADol (Ultram) tablet 50 mg, 50 mg, oral, q6h PRN, KRISTINE Perry-CNP, 50 mg at 09/08/24 2031    vancomycin (Vancocin) pharmacy to dose - pharmacy monitoring, , miscellaneous, Daily PRN, Elsie June, APRN-CNP    vancomycin (Xellia) 2 g in diluent combination  mL, 2 g, intravenous, q12h, KRISTINE Mackey-CNP, Stopped at 09/10/24 0857  Prior to Admission medications    Medication Sig Start Date End Date Taking? Authorizing Provider   atorvastatin (Lipitor) 40 mg tablet Take 1 tablet (40 mg) by mouth once daily. 6/13/24 12/10/24 Yes Natalia Karimi, DO  "  cholecalciferol (Vitamin D-3) 50 mcg (2,000 unit) capsule Take 1 capsule (50 mcg) by mouth early in the morning.. 3/15/21  Yes Historical Provider, MD   cyanocobalamin (Vitamin B-12) 1,000 mcg/mL injection Inject 1 mL (1,000 mcg) into the shoulder, thigh, or buttocks every 30 (thirty) days. 10/3/23  Yes REZA Collins   escitalopram (Lexapro) 20 mg tablet TAKE 1 & 1/2 (ONE AND ONE-HALF) TABLETS BY MOUTH ONCE DAILY 8/26/24  Yes REZA Collins   ferrous sulfate, 325 mg ferrous sulfate, (FeroSuL) tablet Take 1 tablet (325 mg) by mouth once daily as directed 6/13/24  Yes Natalia Karimi DO   folic acid (Folvite) 1 mg tablet Take 1 tablet (1 mg) by mouth once daily. 8/7/23  Yes Marissa Adams MD   lisinopril 10 mg tablet Take 1 tablet (10 mg) by mouth 2 times a day. 6/14/24  Yes REZA Collins   metoprolol tartrate (Lopressor) 50 mg tablet Take 1 tablet by mouth 2 times a day. 4/8/24  Yes REZA Collins   minocycline (Dynacin) 100 mg tablet Take  1 tablet twice daily. Take with a full glass of water and stay upright 60 minutes after taking. Avoid taking with dairy products. 7/30/24  Yes REZA Gregory   tofacitinib ER (Xeljanz XR) 11 mg tablet extended release 24 hr Take 1 tablet (11 mg) by mouth once daily. 7/9/24 1/5/25 Yes Marissa Adams MD   hydrOXYzine HCL (Atarax) 10 mg tablet Take 1 tablet (10 mg) by mouth every 6 hours if needed for anxiety.  Patient not taking: Reported on 6/17/2024 2/7/24 3/8/24  REZA Collins   leflunomide (Arava) 20 mg tablet Take 1 tablet (20 mg) by mouth once daily. 11/20/23 11/19/24  Marissa Adams MD   leflunomide (Arava) 20 mg tablet Take 1 tablet (20 mg) by mouth once daily.  Patient not taking: Reported on 6/17/2024 9/18/23   Marissa Adams MD   syringe with needle (BD Luer-Val Syringe) 3 mL 23 x 1\" syringe use as directed . once a month 2/9/23   Fam Patel MD     No Known " Allergies  Social History     Tobacco Use    Smoking status: Every Day     Current packs/day: 0.25     Average packs/day: 0.3 packs/day for 4.0 years (1.0 ttl pk-yrs)     Types: Cigarettes    Smokeless tobacco: Never   Substance Use Topics    Alcohol use: Yes     Comment: socially         Chemistry    Lab Results   Component Value Date/Time     09/10/2024 0604    K 4.4 09/10/2024 0604     09/10/2024 0604    CO2 26 09/10/2024 0604    BUN 8 09/10/2024 0604    CREATININE 0.71 09/10/2024 0604    Lab Results   Component Value Date/Time    CALCIUM 8.6 09/10/2024 0604    ALKPHOS 85 09/07/2024 1501    AST 38 09/07/2024 1501    ALT 22 09/07/2024 1501    BILITOT 0.5 09/07/2024 1501          Lab Results   Component Value Date/Time    WBC 11.5 (H) 09/10/2024 0604    HGB 11.5 (L) 09/10/2024 0604    HCT 35.9 (L) 09/10/2024 0604     09/10/2024 0604     Lab Results   Component Value Date/Time    PROTIME 11.3 05/02/2023 1607    INR 1.0 05/02/2023 1607     No results found for this or any previous visit (from the past 4464 hour(s)).  No results found for this or any previous visit from the past 1095 days.    Clinical information reviewed:    Allergies  Meds      Fam Hx          NPO Detail:  NPO/Void Status  Carbohydrate Drink Given Prior to Surgery? : N  Date of Last Liquid: 09/09/24  Date of Last Solid: 09/09/24         Physical Exam    Airway  Mallampati: II  TM distance: >3 FB  Neck ROM: full     Cardiovascular - normal exam     Dental    Pulmonary - normal exam     Abdominal - normal exam             Anesthesia Plan    History of general anesthesia?: yes  History of complications of general anesthesia?: no    ASA 2     general     The patient is a current smoker.  Patient was previously instructed to abstain from smoking on day of procedure.  Patient did not smoke on day of procedure.    intravenous induction   Anesthetic plan and risks discussed with patient.

## 2024-09-10 NOTE — PROGRESS NOTES
"Domo Arcos is a 53 y.o. male on day 2 of admission presenting with Septic arthritis (Multi).      Subjective   \"I guess today is the day\" Denies severe pain, chest pain or sob, n/v/c/d       Objective     Last Recorded Vitals  /77 (BP Location: Left arm, Patient Position: Lying)   Pulse 63   Temp 36 °C (96.8 °F) (Temporal)   Resp 18   Wt 107 kg (235 lb 7.2 oz)   SpO2 96%   Intake/Output last 3 Shifts:    Intake/Output Summary (Last 24 hours) at 9/10/2024 1252  Last data filed at 9/10/2024 0105  Gross per 24 hour   Intake 293.67 ml   Output --   Net 293.67 ml       Admission Weight  Weight: 108 kg (238 lb) (09/07/24 1347)    Daily Weight  09/07/24 : 107 kg (235 lb 7.2 oz)    Image Results  Vascular US ankle brachial index (TORO) without exercise  Preliminary Cardiology Report              [place] [address]   Tel [phone] and Fax [phone]                 Preliminary Vascular Lab Report     [facility] US ANKLE BRACHIAL INDEX (TORO) WITHOUT EXERCISE       Patient Name:      DOMO ARCOS Reading Physician:  [id number] Luis Garza MD  Study Date:        9/9/2024      Ordering Physician: [id number] BRII CONKLIN  MRN/PID:           [id number]      Technologist:       Melvina Hsu RDMS, T  Accession#:        [id number]  Technologist 2:  Date of Birth/Age: 1970    Encounter#:         [id number]  Gender:            M  Admission Status:  Inpatient     Location Performed: [facility]       Diagnosis/ICD: Atherosclerosis of native arteries of left leg with ulceration of                 heel and midfoot-I70.244; Peripheral vascular disease,                 unspecified-I73.9  Procedure/CPT: 03063 Peripheral artery TORO Only       Patient History: HTN and Hyperlipidemia. Patient has left foot infection x 2                   weeks. Patient is possibly getting surgery on left foot               "     tomorrow (Sept. 10, 2024). No known history of previous vasc                   surg/stent.  Smoker:          Current, <1 pack per day.       PRELIMINARY CONCLUSIONS:  Right Lower PVR: No evidence of arterial occlusive disease in the right lower extremity at rest. Normal digital perfusion noted. Multiphasic flow is noted in the right common femoral artery, right posterior tibial artery and right dorsalis pedis artery.  Left Lower PVR: No evidence of arterial occlusive disease in the left lower extremity at rest. Normal digital perfusion noted. Multiphasic flow is noted in the left common femoral artery, left posterior tibial artery and left dorsalis pedis artery.     Imaging & Doppler Findings:     RIGHT Lower PVR                Pressures Ratios  Right Posterior Tibial (Ankle) 152 mmHg  1.20  Right Dorsalis Pedis (Ankle)   143 mmHg  1.13  Right Digit (Great Toe)        138 mmHg  1.09          LEFT Lower PVR                Pressures Ratios  Left Posterior Tibial (Ankle) 157 mmHg  1.24  Left Dorsalis Pedis (Ankle)   153 mmHg  1.20  Left Digit (Great Toe)        111 mmHg  0.87                             Right     Left  Brachial Pressure 127 mmHg 123 mmHg            VASCULAR PRELIMINARY REPORT  completed by ADOLFO ARORA on 9/9/2024 at 9:09:36 PM       ** Final **  MR foot left w and wo IV contrast  Narrative: Interpreted By:  Gaudencio Sanchez and Sullivan Shannon   STUDY:  MRI of the left forefoot  with and without IV contrast;  9/9/2024  2:10 pm      INDICATION:  Signs/Symptoms:wound, septic arthritis. Severe metatarsophalangeal  joint subluxation, hallux valgus deformity, and bony destruction of  the 2nd metatarsophalangeal joint concerning for septic arthritis.  Chronic plantar foot wound.      COMPARISON:  Left foot radiograph 07/07/2024      ACCESSION NUMBER(S):  [id number]      ORDERING CLINICIAN:  BRII CONKLIN      TECHNIQUE:  MR imaging of the  left forefoot was obtained  with and without  IV  contrast. 20 mL Gadolinium based intravenous contrast was  administered.      FINDINGS:  Soft tissues:      There is a shallow plantar soft tissue ulcers at the level of the  2nd and 5th metatarsophalangeal joints with associated subjacent soft  tissue edema, suggestive of cellulitis. There is a background of  diffuse soft tissue edema about the entire forefoot.      There is a 9 x 9 x 9 mm T2 and STIR hyperintense loculated, thin  walled, rim enhancing fluid collection laterally adjacent to the  distal 5th metatarsal, seen on series 10, image 16. Of note, this  collection may be contiguous with the 5th metatarsophalangeal joint.          Bones:      There are severe destructive and erosive changes with subluxation of  the 1st through 5th metatarsophalangeal joints. There is also a  severe hallux valgus deformity.      There is obliteration of the 2nd metatarsophalangeal joint with a  complex effusion and marked synovial thickening. There is abnormal  loss of T1 marrow signal, edema, and enhancement of the entire 2nd  proximal phalanx and the 2nd metatarsal head extending proximally  into the mid diaphysis.      There are extensive erosive changes and subluxation 5th  metatarsophalangeal joint with associated marked subchondral cystic  changes the metatarsal head. There is increased marrow edema and  enhancement of the 5th metatarsal head.      Muscles:  Mild atrophy of the plantar foot musculatur.      Miscellaneous:  Evaluation of the forefoot tendons beyond the midfoot  is severely limited. However, there is no evidence of abnormal signal  involving the extensor and flexor tendons in the visualized midfoot.  The Lisfranc ligament is intact.      Impression: 1. Deep plantar soft tissue ulcers at the level of the 2nd  metatarsophalangeal joints with extensive osteomyelitis of the 2nd  proximal phalanx and of the head and distal diaphysis of the 2nd  metatarsal  2. Shallow ulcer at the level of the 5th MTP joint  with osteomyelitis  involving the plantar aspect of the 5th metatarsal head.  3. Evidence of septic arthritis of the 2nd and 5th  metatarsophalangeal joints. These are both dorsally subluxed.  4. Loculated rim enhancing fluid collection adjacent to the 5th  metatarsal head, consistent with a small abscess and which may be  contiguous with the 5th metatarsophalangeal joint.  5. Severe destructive arthropathy with subluxation of the 1st through  5th metatarsophalangeal joints.              I personally reviewed the images/study and I agree with the findings  as stated by radiology resident Dr. Knapp. This study was  interpreted at [place], [place].      MACRO:  Critical Finding:  See findings. Notification was initiated on  9/9/2024 at 2:39 pm by  Sarah Knapp.  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 Superimposed subarticular bone destruction at the 2nd MTP joint suspicious for septic arthritis in the appropriate clinical setting. Diffuse soft tissue swelling of the forefoot.  - vasc TORO no evidence for arterial occlusive disease   -PT Eval and Treat  -Vancomycin IV BID (Day 4)  -Rocephin 1 g IV Daily (Day 4)  -Podiatry consult, appreciate recs   -MRI: 1. Deep plantar soft tissue ulcers at the level of the  2nd  metatarsophalangeal joints with extensive osteomyelitis of the 2nd  proximal phalanx and of the head and distal diaphysis of the 2nd  metatarsal  2. Shallow ulcer at the level of the 5th MTP joint with osteomyelitis  involving the plantar aspect of the 5th metatarsal head.  3. Evidence of septic arthritis of the 2nd and 5th  metatarsophalangeal joints. These are both dorsally subluxed.  4. Loculated rim enhancing fluid collection adjacent to the 5th  metatarsal head, consistent with a small abscess and which may be  contiguous with the 5th metatarsophalangeal joint.  5. Severe destructive arthropathy with subluxation of the 1st through  5th metatarsophalangeal joints.    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)  -continue Arava 20 mg PO Daily   -continue Xeljanz 11 mg PO Daily      #Depression  -continue Lexapro 30 mg PO Daily      #Hypertension  -Bps stable since admission  -continue Lisinopril 10 mg PO BID  -continue Lopressor 50 mg PO BID     #Vitamin D deficiency  -continue Vitamin D 2,000 units PO Daily      #Uncomplicated alcohol dependence (Multi)  - [facility] Protocol   - Folic Acid 1 mg PO Daily  - Multivitamin Daily   - Vitamin B-1 100 mg PO Daily      #Hyperlipidemia  - , VLDL 46, trig 232, chol 217 (10/4/2023)  - continue Lipitor 40 mg PO Daily      DVT Prophylaxis: Lovenox   Fluids: N/A  Nutrition: Cardiac    Code Status: Full Code   Pt requires inpatient stay at this time.              Holley El, KRISTINE-CNP

## 2024-09-10 NOTE — ANESTHESIA POSTPROCEDURE EVALUATION
Patient: Domo Arcos    Procedure Summary       Date: 09/10/24 Room / Location: GEN OR 02 / Virtual [facility] OR    Anesthesia Start: 1724 Anesthesia Stop: 1927    Procedures:       Arthrodesis Foot (Left)      Excision Bone Foot (Left) Diagnosis:       Septic arthritis of left foot, due to unspecified organism (Multi)      (Septic arthritis of left foot, due to unspecified organism (Multi) [M00.9])    Surgeons: Rigo Tracy DPM Responsible Provider: VERONIQUE Nettles    Anesthesia Type: general ASA Status: 2            Anesthesia Type: general    Vitals Value Taken Time   /80 09/10/24 1942   Temp 37.0 09/10/24 1942   Pulse 73 09/10/24 1942   Resp 16 09/10/24 1942   SpO2 98 09/10/24 1942       Anesthesia Post Evaluation    Patient location during evaluation: PACU  Patient participation: complete - patient participated  Level of consciousness: awake  Pain score: 0  Pain management: adequate  Multimodal analgesia pain management approach  Airway patency: patent  Cardiovascular status: acceptable  Respiratory status: acceptable  Hydration status: acceptable  Postoperative Nausea and Vomiting: none        No notable events documented.

## 2024-09-10 NOTE — CARE PLAN
The patient's goals for the shift include      The clinical goals for the shift include The patient will remain pain free during this shift    Over the shift, the patient did not make progress toward the following goals. Barriers to progression include . Recommendations to address these barriers include   Problem: Skin  Goal: Decreased wound size/increased tissue granulation at next dressing change  Outcome: Progressing  Goal: Participates in plan/prevention/treatment measures  Outcome: Progressing  Goal: Prevent/manage excess moisture  Outcome: Progressing  Goal: Prevent/minimize sheer/friction injuries  Outcome: Progressing  Goal: Promote/optimize nutrition  Outcome: Progressing  Goal: Promote skin healing  Outcome: Progressing     Problem: Fall/Injury  Goal: Not fall by end of shift  Outcome: Progressing  Goal: Be free from injury by end of the shift  Outcome: Progressing  Goal: Verbalize understanding of personal risk factors for fall in the hospital  Outcome: Progressing  Goal: Verbalize understanding of risk factor reduction measures to prevent injury from fall in the home  Outcome: Progressing  Goal: Use assistive devices by end of the shift  Outcome: Progressing  Goal: Pace activities to prevent fatigue by end of the shift  Outcome: Progressing     Problem: Pain - Adult  Goal: Verbalizes/displays adequate comfort level or baseline comfort level  Outcome: Progressing     Problem: Safety - Adult  Goal: Free from fall injury  Outcome: Progressing     Problem: Discharge Planning  Goal: Discharge to home or other facility with appropriate resources  Outcome: Progressing     Problem: Chronic Conditions and Co-morbidities  Goal: Patient's chronic conditions and co-morbidity symptoms are monitored and maintained or improved  Outcome: Progressing     Problem: Pain  Goal: Takes deep breaths with improved pain control throughout the shift  Outcome: Progressing  Goal: Turns in bed with improved pain control throughout  the shift  Outcome: Progressing  Goal: Walks with improved pain control throughout the shift  Outcome: Progressing  Goal: Performs ADL's with improved pain control throughout shift  Outcome: Progressing  Goal: Participates in PT with improved pain control throughout the shift  Outcome: Progressing  Goal: Free from opioid side effects throughout the shift  Outcome: Progressing  Goal: Free from acute confusion related to pain meds throughout the shift  Outcome: Progressing   .

## 2024-09-10 NOTE — ANESTHESIA PROCEDURE NOTES
Airway  Date/Time: 9/10/2024 5:30 PM  Urgency: elective    Airway not difficult    Staffing  Performed: CRNA   Authorized by: VERONIQUE Nettles    Performed by: VERONIQUE Nettles  Patient location during procedure: OR    Indications and Patient Condition  Indications for airway management: anesthesia  Spontaneous Ventilation: absent  Sedation level: deep  Preoxygenated: yes  Patient position: sniffing  MILS maintained throughout  Mask difficulty assessment: 1 - vent by mask    Final Airway Details  Final airway type: supraglottic airway      Successful airway: Size 5 (igel)     Number of attempts at approach: 1

## 2024-09-11 ENCOUNTER — PHARMACY VISIT (OUTPATIENT)
Dept: PHARMACY | Facility: CLINIC | Age: 54
End: 2024-09-11
Payer: COMMERCIAL

## 2024-09-11 VITALS
HEART RATE: 70 BPM | RESPIRATION RATE: 18 BRPM | SYSTOLIC BLOOD PRESSURE: 118 MMHG | DIASTOLIC BLOOD PRESSURE: 74 MMHG | TEMPERATURE: 97.4 F | WEIGHT: 235.45 LBS | BODY MASS INDEX: 37.84 KG/M2 | OXYGEN SATURATION: 94 % | HEIGHT: 66 IN

## 2024-09-11 DIAGNOSIS — E78.5 DYSLIPIDEMIA: ICD-10-CM

## 2024-09-11 DIAGNOSIS — I10 PRIMARY HYPERTENSION: ICD-10-CM

## 2024-09-11 DIAGNOSIS — M05.9 SEROPOSITIVE RHEUMATOID ARTHRITIS (MULTI): Primary | ICD-10-CM

## 2024-09-11 DIAGNOSIS — D50.8 OTHER IRON DEFICIENCY ANEMIA: ICD-10-CM

## 2024-09-11 LAB
ANION GAP SERPL CALC-SCNC: 13 MMOL/L (ref 10–20)
BUN SERPL-MCNC: 11 MG/DL (ref 6–23)
CALCIUM SERPL-MCNC: 8.8 MG/DL (ref 8.6–10.3)
CHLORIDE SERPL-SCNC: 105 MMOL/L (ref 98–107)
CO2 SERPL-SCNC: 22 MMOL/L (ref 21–32)
CREAT SERPL-MCNC: 0.64 MG/DL (ref 0.5–1.3)
EGFRCR SERPLBLD CKD-EPI 2021: >90 ML/MIN/1.73M*2
ERYTHROCYTE [DISTWIDTH] IN BLOOD BY AUTOMATED COUNT: 13.6 % (ref 11.5–14.5)
GLUCOSE SERPL-MCNC: 175 MG/DL (ref 74–99)
HCT VFR BLD AUTO: 37.1 % (ref 41–52)
HGB BLD-MCNC: 11.5 G/DL (ref 13.5–17.5)
HOLD SPECIMEN: NORMAL
MCH RBC QN AUTO: 28.5 PG (ref 26–34)
MCHC RBC AUTO-ENTMCNC: 31 G/DL (ref 32–36)
MCV RBC AUTO: 92 FL (ref 80–100)
NRBC BLD-RTO: 0 /100 WBCS (ref 0–0)
PLATELET # BLD AUTO: 255 X10*3/UL (ref 150–450)
POTASSIUM SERPL-SCNC: 4 MMOL/L (ref 3.5–5.3)
RBC # BLD AUTO: 4.04 X10*6/UL (ref 4.5–5.9)
SODIUM SERPL-SCNC: 136 MMOL/L (ref 136–145)
WBC # BLD AUTO: 17.6 X10*3/UL (ref 4.4–11.3)

## 2024-09-11 PROCEDURE — 2500000001 HC RX 250 WO HCPCS SELF ADMINISTERED DRUGS (ALT 637 FOR MEDICARE OP): Mod: IPSPLIT | Performed by: STUDENT IN AN ORGANIZED HEALTH CARE EDUCATION/TRAINING PROGRAM

## 2024-09-11 PROCEDURE — RXMED WILLOW AMBULATORY MEDICATION CHARGE

## 2024-09-11 PROCEDURE — 99239 HOSP IP/OBS DSCHRG MGMT >30: CPT | Performed by: NURSE PRACTITIONER

## 2024-09-11 PROCEDURE — 80048 BASIC METABOLIC PNL TOTAL CA: CPT | Mod: IPSPLIT | Performed by: STUDENT IN AN ORGANIZED HEALTH CARE EDUCATION/TRAINING PROGRAM

## 2024-09-11 PROCEDURE — 85027 COMPLETE CBC AUTOMATED: CPT | Mod: IPSPLIT | Performed by: STUDENT IN AN ORGANIZED HEALTH CARE EDUCATION/TRAINING PROGRAM

## 2024-09-11 PROCEDURE — 94760 N-INVAS EAR/PLS OXIMETRY 1: CPT | Mod: IPSPLIT

## 2024-09-11 PROCEDURE — 99231 SBSQ HOSP IP/OBS SF/LOW 25: CPT | Performed by: PHYSICIAN ASSISTANT

## 2024-09-11 PROCEDURE — 2500000001 HC RX 250 WO HCPCS SELF ADMINISTERED DRUGS (ALT 637 FOR MEDICARE OP): Mod: IPSPLIT

## 2024-09-11 PROCEDURE — 2500000004 HC RX 250 GENERAL PHARMACY W/ HCPCS (ALT 636 FOR OP/ED): Mod: JZ,IPSPLIT | Performed by: STUDENT IN AN ORGANIZED HEALTH CARE EDUCATION/TRAINING PROGRAM

## 2024-09-11 PROCEDURE — 2500000001 HC RX 250 WO HCPCS SELF ADMINISTERED DRUGS (ALT 637 FOR MEDICARE OP): Mod: IPSPLIT | Performed by: NURSE PRACTITIONER

## 2024-09-11 PROCEDURE — 97161 PT EVAL LOW COMPLEX 20 MIN: CPT | Mod: GP,IPSPLIT | Performed by: PHYSICAL THERAPIST

## 2024-09-11 PROCEDURE — 36415 COLL VENOUS BLD VENIPUNCTURE: CPT | Mod: IPSPLIT | Performed by: STUDENT IN AN ORGANIZED HEALTH CARE EDUCATION/TRAINING PROGRAM

## 2024-09-11 RX ORDER — ASPIRIN 81 MG/1
81 TABLET ORAL DAILY
Status: DISCONTINUED | OUTPATIENT
Start: 2024-09-11 | End: 2024-09-11 | Stop reason: HOSPADM

## 2024-09-11 RX ORDER — OXYCODONE AND ACETAMINOPHEN 5; 325 MG/1; MG/1
1 TABLET ORAL EVERY 6 HOURS PRN
Qty: 20 TABLET | Refills: 0 | Status: SHIPPED | OUTPATIENT
Start: 2024-09-11 | End: 2024-09-16

## 2024-09-11 RX ORDER — CEFUROXIME AXETIL 500 MG/1
500 TABLET ORAL 2 TIMES DAILY
Qty: 20 TABLET | Refills: 0 | Status: SHIPPED | OUTPATIENT
Start: 2024-09-11 | End: 2024-09-21

## 2024-09-11 RX ORDER — FERROUS SULFATE 325(65) MG
1 TABLET ORAL DAILY
Qty: 90 TABLET | Refills: 0 | Status: SHIPPED | OUTPATIENT
Start: 2024-09-11

## 2024-09-11 RX ORDER — FOLIC ACID 1 MG/1
1 TABLET ORAL DAILY
Qty: 30 TABLET | Refills: 11 | Status: SHIPPED | OUTPATIENT
Start: 2024-09-11

## 2024-09-11 RX ORDER — ASPIRIN 81 MG/1
81 TABLET ORAL DAILY
Start: 2024-09-11

## 2024-09-11 RX ORDER — AMOXICILLIN 250 MG
1 CAPSULE ORAL 2 TIMES DAILY
Qty: 10 TABLET | Refills: 0 | Status: SHIPPED | OUTPATIENT
Start: 2024-09-11 | End: 2024-09-16

## 2024-09-11 RX ORDER — ATORVASTATIN CALCIUM 40 MG/1
40 TABLET, FILM COATED ORAL DAILY
Qty: 90 TABLET | Refills: 0 | Status: SHIPPED | OUTPATIENT
Start: 2024-09-11 | End: 2025-03-10

## 2024-09-11 RX ORDER — METOPROLOL TARTRATE 50 MG/1
50 TABLET ORAL 2 TIMES DAILY
Qty: 180 TABLET | Refills: 1 | OUTPATIENT
Start: 2024-09-11

## 2024-09-11 ASSESSMENT — PAIN - FUNCTIONAL ASSESSMENT
PAIN_FUNCTIONAL_ASSESSMENT: 0-10

## 2024-09-11 ASSESSMENT — COGNITIVE AND FUNCTIONAL STATUS - GENERAL
WALKING IN HOSPITAL ROOM: A LITTLE
STANDING UP FROM CHAIR USING ARMS: A LITTLE
DRESSING REGULAR LOWER BODY CLOTHING: A LITTLE
MOVING TO AND FROM BED TO CHAIR: A LITTLE
DAILY ACTIVITIY SCORE: 23
STANDING UP FROM CHAIR USING ARMS: A LITTLE
MOBILITY SCORE: 20
CLIMB 3 TO 5 STEPS WITH RAILING: A LITTLE
MOVING TO AND FROM BED TO CHAIR: A LITTLE
MOBILITY SCORE: 19
WALKING IN HOSPITAL ROOM: A LITTLE
CLIMB 3 TO 5 STEPS WITH RAILING: A LOT

## 2024-09-11 ASSESSMENT — LIFESTYLE VARIABLES
TREMOR: NO TREMOR
AUDITORY DISTURBANCES: NOT PRESENT
PAROXYSMAL SWEATS: NO SWEAT VISIBLE
VISUAL DISTURBANCES: NOT PRESENT
ORIENTATION AND CLOUDING OF SENSORIUM: ORIENTED AND CAN DO SERIAL ADDITIONS
NAUSEA AND VOMITING: NO NAUSEA AND NO VOMITING
ORIENTATION AND CLOUDING OF SENSORIUM: ORIENTED AND CAN DO SERIAL ADDITIONS
ANXIETY: NO ANXIETY, AT EASE
AGITATION: NORMAL ACTIVITY
HEADACHE, FULLNESS IN HEAD: NOT PRESENT
AUDITORY DISTURBANCES: NOT PRESENT
TOTAL SCORE: 0
VISUAL DISTURBANCES: NOT PRESENT
TREMOR: NO TREMOR
NAUSEA AND VOMITING: NO NAUSEA AND NO VOMITING
AGITATION: NORMAL ACTIVITY
TOTAL SCORE: 0
PAROXYSMAL SWEATS: NO SWEAT VISIBLE
ANXIETY: NO ANXIETY, AT EASE
HEADACHE, FULLNESS IN HEAD: NOT PRESENT

## 2024-09-11 ASSESSMENT — PAIN SCALES - GENERAL
PAINLEVEL_OUTOF10: 0 - NO PAIN

## 2024-09-11 NOTE — PROGRESS NOTES
Physical Therapy    Physical Therapy Evaluation    Patient Name: Domo Arcos  MRN: [id number]  Department: [facility]  Room: [address] [place], [place] [address]  Today's Date: 9/11/2024   Time Calculation  Start Time: 1150  Stop Time: 1210  Time Calculation (min): 20 min    Assessment/Plan   PT Assessment  Rehab Prognosis: Good  Barriers to Discharge: n/a  Evaluation/Treatment Tolerance: Patient tolerated treatment well  Medical Staff Made Aware: Yes  Strengths: Ability to acquire knowledge  Barriers to Participation:  (n/a)  End of Session Communication: Bedside nurse (CNP)  Assessment Comment: Pleasant 53 y.o s/p L foot sx and is now NWB. Pt. lives with wife and is normally IND. Pt. has R elbow limitations (at baseline) and did not feel comfortable with crutches at this time. Provided instruction and issued SW for home use. No POC initiated as pt. is being d/c today.  End of Session Patient Position: Bed, 2 rail up, Alarm on  IP OR SWING BED PT PLAN  Inpatient or Swing Bed: Inpatient  PT Plan  PT Plan: PT Eval only  PT Eval Only Reason:  (d/c today)  PT Discharge Recommendations: Low intensity level of continued care  Equipment Recommended upon Discharge:  (issued standard walker)  PT Recommended Transfer Status: Assist x1  PT - OK to Discharge: Yes Based on completed evaluation and care plan recommendations, no barriers to discharge to next site of care        Subjective   General Visit Information:  General  Reason for Referral: septic arthritis. impaired mobility  Referred By: REZA Logan  Past Medical History Relevant to Rehab: RA, HTN, depression  Family/Caregiver Present: Yes  Prior to Session Communication: Bedside nurse, Care Coordinator  General Comment: Arthrodesis Foot, SHARATH drain (s/p L second metatarsal bone biopsy  x 4 metatarsal head resections left second, third, fourth and fifth metatarsal head myofascial flap closure of plantar wound  open flexor tenotomy left second digit  extensor tenotomy second and fifth  digit open)  Home Living:  Home Living  Type of Home: House  Lives With: Spouse  Home Adaptive Equipment: None  Home Layout: One level  Home Access: Stairs to enter without rails  Entrance Stairs-Rails: None  Entrance Stairs-Number of Steps: 2  Bathroom Shower/Tub: Tub/shower unit  Prior Level of Function:  Prior Function Per Pt/Caregiver Report  Level of [place]: Independent with ADLs and functional transfers, Independent with homemaking with ambulation  Ambulatory Assistance: Independent  Precautions:  Precautions  LE Weight Bearing Status: Left Non-Weight Bearing (w/ surgical shoe)  Medical Precautions: Fall precautions    Objective   Pain:  Pain Assessment  Pain Assessment: 0-10  0-10 (Numeric) Pain Score: 0 - No pain  Cognition:  Cognition  Overall Cognitive Status: Within Functional Limits    General Assessments:  Activity Tolerance  Endurance: Decreased tolerance for upright activites    Sensation  Sensation Comment: no sensation in L foot from sx/block    Strength  Strength Comments: WFL except L foot (not able to feel or DF/PF at this time)   Coordination  Movements are Fluid and Coordinated: Yes    Static Standing Balance  Static Standing-Comment/Number of Minutes: good-; not safe with crutches at this time. Recommend SW  Dynamic Standing Balance  Dynamic Standing-Comments: good-; recommend SW to maintain NWB  Functional Assessments:  Bed Mobility  Bed Mobility: Yes  Bed Mobility 1  Bed Mobility 1: Supine to sitting, Sitting to supine  Level of Assistance 1: Independent    Transfers  Transfer: Yes  Transfer 1  Technique 1: Sit to stand, Stand to sit  Transfer Device 1:  (attempted with crutches, SW and knee scooter. Max verbal and visual cues provided for sequencing with hands. Most support/stability observed with SW. Recommend SW at this time.)  Trials/Comments 1: x4    Ambulation/Gait Training  Ambulation/Gait Training Performed: Yes  Ambulation/Gait Training 1  Assistance 1: Close  supervision  Quality of Gait 1:  (NWB; limited amb. tolerance)  Comments/Distance (ft) 1: 5x 2  Extremity/Trunk Assessments:  RUE   RUE :  (limited elbow extension (at baseline))  RLE   RLE : Within Functional Limits  Outcome Measures:  [facility] Basic Mobility  Turning from your back to your side while in a flat bed without using bedrails: None  Moving from lying on your back to sitting on the side of a flat bed without using bedrails: None  Moving to and from bed to chair (including a wheelchair): A little  Standing up from a chair using your arms (e.g. wheelchair or bedside chair): A little  To walk in hospital room: A little  Climbing 3-5 steps with railing: A lot  Basic Mobility - Total Score: 19    Encounter Problems       Encounter Problems (Active)       Pain - Adult              Education Documentation  Precautions, taught by Stephanie Cronin, PT at 9/11/2024 12:20 PM.  Learner: Patient  Readiness: Acceptance  Method: Explanation  Response: Verbalizes Understanding, Demonstrated Understanding  Comment: Educated pt. on NWB status    Education Comments  No comments found.

## 2024-09-11 NOTE — NURSING NOTE
Pt arrived to the floor via stretcher, O2 2L via NC, O2 sat 94%. Sleepy yet arouses to verbal stimuli. Wife at bedside. Pt denies pain.

## 2024-09-11 NOTE — NURSING NOTE
0705: assumed care of patient    0909: meds administered per mar. Patient accepted whole followed by thin liquids. Surgical dressing to left foot dry and intact. SHARATH drain intact with a scant amount of red drainage.     1025: Patient updated on plan of care    1215: Discharge instructions reviewed with patient and spouse.     1250: Patient discharged to home at this time

## 2024-09-11 NOTE — CARE PLAN
Problem: Skin  Goal: Decreased wound size/increased tissue granulation at next dressing change  Flowsheets (Taken 9/11/2024 0445)  Decreased wound size/increased tissue granulation at next dressing change: Promote sleep for wound healing  Goal: Participates in plan/prevention/treatment measures  Flowsheets (Taken 9/11/2024 0445)  Participates in plan/prevention/treatment measures:   Discuss with provider PT/OT consult   Elevate heels   Increase activity/out of bed for meals  Goal: Prevent/manage excess moisture  Flowsheets (Taken 9/11/2024 0445)  Prevent/manage excess moisture:   Monitor for/manage infection if present   Follow provider orders for dressing changes  Goal: Prevent/minimize sheer/friction injuries  Flowsheets (Taken 9/11/2024 0445)  Prevent/minimize sheer/friction injuries:   Use pull sheet   HOB 30 degrees or less  Goal: Promote/optimize nutrition  Flowsheets (Taken 9/11/2024 0445)  Promote/optimize nutrition:   Monitor/record intake including meals   Offer water/supplements/favorite foods     Problem: Fall/Injury  Goal: Not fall by end of shift  Outcome: Progressing  Goal: Be free from injury by end of the shift  Outcome: Progressing  Goal: Verbalize understanding of personal risk factors for fall in the hospital  Outcome: Progressing  Goal: Verbalize understanding of risk factor reduction measures to prevent injury from fall in the home  Outcome: Progressing     Problem: Pain - Adult  Goal: Verbalizes/displays adequate comfort level or baseline comfort level  Outcome: Progressing     Problem: Safety - Adult  Goal: Free from fall injury  Outcome: Progressing   The patient's goals for the shift include      The clinical goals for the shift include Pt will have pain controlled this shift.    Over the shift, the patient did  make progress toward the following goals.

## 2024-09-11 NOTE — PROGRESS NOTES
"Domo Arcos is a 53 y.o. male on day 3 of admission presenting with Septic arthritis (Multi).    Subjective   POD #1 arthrodesis great toe metatarsophalangeal joint, x4 metatarsal head resections. Doing well, block is still in effect no sensation or motor to lower extremity distal to the mid calf. Ready to go home with close follow up. Has walker for NWB.       Objective       Objective:   Physical Exam:  General: Well developed, awake/alert/oriented x3, no distress, alert and cooperative.    Skin: Warm and dry    Eyes: EOMI    Head/neck: No apparent injury    Cardiac: Palpable pulses    Respiratory: Normal rise and fall of chest    GI: No obvious abdominal distension    Extremities: No edema or deformity    Neuro: Sensation and motor function absent distal to the mid calf, affect from regional blocks yesterday    Psych: Appropriate mood    Focused Musculoskeletal:  Passive ROM of ankle intact.  Dressing in place, not saturated.           Last Recorded Vitals  Blood pressure 118/74, pulse 70, temperature 36.3 °C (97.4 °F), temperature source Temporal, resp. rate 18, height 1.676 m (5' 6\"), weight 107 kg (235 lb 7.2 oz), SpO2 94%.    Intake/Output last 3 Shifts:  I/O last 3 completed shifts:  In: 1760 (16.5 mL/kg) [P.O.:240; I.V.:270 (2.5 mL/kg); IV Piggyback:1250]  Out: 1370 (12.8 mL/kg) [Urine:1200 (0.3 mL/kg/hr); Drains:20; Blood:150]  Weight: 106.8 kg     Relevant Results  Results for orders placed or performed during the hospital encounter of 09/07/24 (from the past 24 hour(s))   Tissue/Wound Culture/Smear    Specimen: BONE RESECTION; Tissue   Result Value Ref Range    Gram Stain (1+) Rare Polymorphonuclear leukocytes     Gram Stain No organisms seen    Basic metabolic panel   Result Value Ref Range    Glucose 175 (H) 74 - 99 mg/dL    Sodium 136 136 - 145 mmol/L    Potassium 4.0 3.5 - 5.3 mmol/L    Chloride 105 98 - 107 mmol/L    Bicarbonate 22 21 - 32 mmol/L    Anion Gap 13 10 - 20 mmol/L    Urea Nitrogen 11 " 6 - 23 mg/dL    Creatinine 0.64 0.50 - 1.30 mg/dL    eGFR >90 >60 mL/min/1.73m*2    Calcium 8.8 8.6 - 10.3 mg/dL   CBC   Result Value Ref Range    WBC 17.6 (H) 4.4 - 11.3 x10*3/uL    nRBC 0.0 0.0 - 0.0 /100 WBCs    RBC 4.04 (L) 4.50 - 5.90 x10*6/uL    Hemoglobin 11.5 (L) 13.5 - 17.5 g/dL    Hematocrit 37.1 (L) 41.0 - 52.0 %    MCV 92 80 - 100 fL    MCH 28.5 26.0 - 34.0 pg    MCHC 31.0 (L) 32.0 - 36.0 g/dL    RDW 13.6 11.5 - 14.5 %    Platelets 255 150 - 450 x10*3/uL   SST TOP   Result Value Ref Range    Extra Tube Hold for add-ons.       Vascular US ankle brachial index (TORO) without exercise    Result Date: 9/10/2024            [place] [address]  Tel [phone] and Fax [phone]  Vascular Lab Report [facility] US ANKLE BRACHIAL INDEX (TORO) WITHOUT EXERCISE  Patient Name:      FLO Avalos Physician:  [id number] Luis Garza MD Study Date:        9/9/2024              Ordering Physician: [id number] BRII CONKLIN MRN/PID:           [id number]              Technologist:       Melvina Hsu RDMS, T Accession#:        [id number]          Technologist 2: Date of Birth/Age: 1970 / 53 years Encounter#:         [id number] Gender:            M Admission Status:  Inpatient             Location Performed: [facility]  Diagnosis/ICD: Atherosclerosis of native arteries of left leg with ulceration of                heel and midfoot-I70.244; Peripheral vascular disease,                unspecified-I73.9 CPT Codes:     71095 Peripheral artery TORO Only  Patient History HTN and Hyperlipidemia. Patient has left foot infection x 2                 weeks. Patient is possibly getting surgery on left foot tomorrow                  (Sept. 10, 2024). No known history of previous vasc surg/stent. Smoker:         Current, <1 pack per day.  CONCLUSIONS: Right Lower PVR: No evidence of arterial occlusive disease in the right lower extremity at rest. Normal digital perfusion noted. Multiphasic flow is noted in the right common femoral artery, right posterior tibial artery and right dorsalis pedis artery. Left Lower PVR: No evidence of arterial occlusive disease in the left lower extremity at rest. Normal digital perfusion noted. Multiphasic flow is noted in the left common femoral artery, left posterior tibial artery and left dorsalis pedis artery.  Imaging & Doppler Findings:  RIGHT Lower PVR                Pressures Ratios Right Posterior Tibial (Ankle) 152 mmHg  1.20 Right Dorsalis Pedis (Ankle)   143 mmHg  1.13 Right Digit (Great Toe)        138 mmHg  1.09   LEFT Lower PVR                Pressures Ratios Left Posterior Tibial (Ankle) 157 mmHg  1.24 Left Dorsalis Pedis (Ankle)   153 mmHg  1.20 Left Digit (Great Toe)        111 mmHg  0.87                     Right     Left Brachial Pressure 127 mmHg 123 mmHg   [id number] Luis Garza MD Electronically signed by [id number] Luis Garza MD on 9/10/2024 at 10:03:08 PM  ** Final **     MR foot left w and wo IV contrast    Result Date: 9/9/2024  Interpreted By:  Gaudencio Sanchez and Sullivan Shannon STUDY: MRI of the left forefoot  with and without IV contrast;  9/9/2024 2:10 pm   INDICATION: Signs/Symptoms:wound, septic arthritis. Severe metatarsophalangeal joint subluxation, hallux valgus deformity, and bony destruction of the 2nd metatarsophalangeal joint concerning for septic arthritis. Chronic plantar foot wound.   COMPARISON: Left foot radiograph 07/07/2024   ACCESSION NUMBER(S): [id number]   ORDERING CLINICIAN: BRII CONKLIN   TECHNIQUE: MR imaging of the  left forefoot was obtained  with and without IV contrast. 20 mL Gadolinium based intravenous contrast was  administered.   FINDINGS: Soft tissues:   There is a shallow plantar soft tissue ulcers at the level of the 2nd and 5th metatarsophalangeal joints with associated subjacent soft tissue edema, suggestive of cellulitis. There is a background of diffuse soft tissue edema about the entire forefoot.   There is a 9 x 9 x 9 mm T2 and STIR hyperintense loculated, thin walled, rim enhancing fluid collection laterally adjacent to the distal 5th metatarsal, seen on series 10, image 16. Of note, this collection may be contiguous with the 5th metatarsophalangeal joint.     Bones:   There are severe destructive and erosive changes with subluxation of the 1st through 5th metatarsophalangeal joints. There is also a severe hallux valgus deformity.   There is obliteration of the 2nd metatarsophalangeal joint with a complex effusion and marked synovial thickening. There is abnormal loss of T1 marrow signal, edema, and enhancement of the entire 2nd proximal phalanx and the 2nd metatarsal head extending proximally into the mid diaphysis.   There are extensive erosive changes and subluxation 5th metatarsophalangeal joint with associated marked subchondral cystic changes the metatarsal head. There is increased marrow edema and enhancement of the 5th metatarsal head.   Muscles:  Mild atrophy of the plantar foot musculatur.   Miscellaneous:  Evaluation of the forefoot tendons beyond the midfoot is severely limited. However, there is no evidence of abnormal signal involving the extensor and flexor tendons in the visualized midfoot. The Lisfranc ligament is intact.       1. Deep plantar soft tissue ulcers at the level of the 2nd metatarsophalangeal joints with extensive osteomyelitis of the 2nd proximal phalanx and of the head and distal diaphysis of the 2nd metatarsal 2. Shallow ulcer at the level of the 5th MTP joint with osteomyelitis involving the plantar aspect of the 5th metatarsal head. 3. Evidence of septic arthritis of the 2nd and  5th metatarsophalangeal joints. These are both dorsally subluxed. 4. Loculated rim enhancing fluid collection adjacent to the 5th metatarsal head, consistent with a small abscess and which may be contiguous with the 5th metatarsophalangeal joint. 5. Severe destructive arthropathy with subluxation of the 1st through 5th metatarsophalangeal joints.       I personally reviewed the images/study and I agree with the findings as stated by radiology resident Dr. Knapp. This study was interpreted at [place], [place].   MACRO: Critical Finding:  See findings. Notification was initiated on 9/9/2024 at 2:39 pm by  Sarah Knapp.  (**-OCF-**)   Signed by: Gaudencio Sanchez 9/9/2024 3:14 PM Dictation workstation:   [id number]    XR foot left 3+ views    Result Date: 9/7/2024  Interpreted By:  Wali Bustamante, STUDY: XR FOOT LEFT 3+ VIEWS; ;  9/7/2024 1:12 pm   INDICATION: Signs/Symptoms:swelling.   COMPARISON: None.   ACCESSION NUMBER(S): [id number]   ORDERING CLINICIAN: HOA GRAY   TECHNIQUE: 3 radiographs of the left foot are performed.   FINDINGS: There is severe hallux valgus deformity. There is lateral subluxation and angulation of the 2nd and 3rd proximal phalanx and dorsum medial subluxation and angulation of the 5th proximal phalanx. There is suspected bone destruction at the 2nd metatarsal head with lucency extending into the proximal diaphysis. There is subtle subcortical lucency with cortical thinning and irregularity at the base of the 2nd proximal phalanx medially. There is multifocal osteoarthritic changes at the 1st MTP joint and multiple IP joints. The remaining osseous structures are intact.   There is diffuse soft tissue swelling of the forefoot which is more pronounced at the MTP joints.       Multifocal subluxations of the MTP joints as detailed above with severe hallux valgus deformity.   Superimposed subarticular bone destruction at the  2nd MTP joint suspicious for septic arthritis in the appropriate clinical setting.   Diffuse soft tissue swelling of the forefoot.     MACRO: None   Signed by: Wali Bustamante 9/7/2024 1:22 PM Dictation workstation:   [id number]      Assessment/Plan   Assessment & Plan  Rheumatoid arthritis (Multi)    Septic arthritis of left foot, due to unspecified organism (Multi)      Rheumatoid arthritis left foot with superficial ulcer and cellulitis. Remote chance of osteomyelitis.     Postop day 1 left arthrodesis metatarsal phalangeal joint of the great toe, 2 through 5 metatarsal head resection.  Pain is well-controlled.  Regional block from preop yesterday is still in effect.  Sure he will this will wear off and if it does not he should call the office immediately.    -Follow-up Monday in podiatry office in 8 a.m.  -NWB with walker  -Leave dressing intact  -ABX, pain meds per medicine team  -ASA for DVT prophylaxis  -PT outpatient to be set up    Discussed case with Dr. Tracy in detail         Dustin Garcia PA-C

## 2024-09-11 NOTE — PROGRESS NOTES
09/11/24 1109   Discharge Planning   Home or Post Acute Services None   Expected Discharge Disposition Home   Does the patient need discharge transport arranged? No     Patient medically ready for discharge.  Patient follow up information on discharge instructions.  Patient will be returning home.  PT to issue surgical shoe and crutches for patient and Nurse will review how to empty drain.  Patient is to follow up with Podiatry for post op visit and dressing change.  Patient is in agreement with discharge plan.      DC PLAN:  Home w/ surgical shoe and crutches

## 2024-09-11 NOTE — DISCHARGE SUMMARY
"Discharge Diagnosis  Septic arthritis (Multi)  Anemia    Issues Requiring Follow-Up      Discharge Meds     Medication List      START taking these medications     cefuroxime 500 mg tablet; Commonly known as: Ceftin; Take 1 tablet (500   mg) by mouth 2 times a day for 10 days.   oxyCODONE-acetaminophen 5-325 mg tablet; Commonly known as: Percocet;   Take 1 tablet by mouth every 6 hours if needed for severe pain (7 - 10)   for up to 5 days.   sennosides-docusate sodium 8.6-50 mg tablet; Commonly known as:   Kimmy-Colace; Take 1 tablet by mouth 2 times a day for 5 days.     CHANGE how you take these medications     leflunomide 20 mg tablet; Commonly known as: Arava; Take 1 tablet (20   mg) by mouth once daily.; What changed: Another medication with the same   name was removed. Continue taking this medication, and follow the   directions you see here.     CONTINUE taking these medications     atorvastatin 40 mg tablet; Commonly known as: Lipitor; Take 1 tablet (40   mg) by mouth once daily.   BD Luer-Val Syringe 3 mL 23 x 1\" syringe; Generic drug: syringe with   needle; use as directed . once a month   cholecalciferol 50 mcg (2,000 unit) capsule; Commonly known as: Vitamin   D-3   cyanocobalamin 1,000 mcg/mL injection; Commonly known as: Vitamin B-12;   Inject 1 mL (1,000 mcg) into the shoulder, thigh, or buttocks every 30   (thirty) days.   escitalopram 20 mg tablet; Commonly known as: Lexapro; TAKE 1 & 1/2 (ONE   AND ONE-HALF) TABLETS BY MOUTH ONCE DAILY   FeroSuL tablet; Generic drug: ferrous sulfate (325 mg ferrous sulfate);   Take 1 tablet (325 mg) by mouth once daily as directed   folic acid 1 mg tablet; Commonly known as: Folvite; Take 1 tablet (1 mg)   by mouth once daily.   lisinopril 10 mg tablet; Take 1 tablet (10 mg) by mouth 2 times a day.   metoprolol tartrate 50 mg tablet; Commonly known as: Lopressor; Take 1   tablet by mouth 2 times a day.   minocycline 100 mg tablet; Commonly known as: Dynacin; Take  1 " tablet   twice daily. Take with a full glass of water and stay upright 60 minutes   after taking. Avoid taking with dairy products.   Xeljanz XR 11 mg tablet extended release 24 hr; Generic drug:   tofacitinib ER; Take 1 tablet (11 mg) by mouth once daily.     STOP taking these medications     hydrOXYzine HCL 10 mg tablet; Commonly known as: Atarax       Test Results Pending At Discharge  Pending Labs       Order Current Status    Staphylococcus aureus/MRSA colonization, Culture In process    Surgical Pathology Exam In process    Tissue/Wound Culture/Smear Preliminary result        53 y.o. male presenting with left foot pain x 2 weeks. Patient denies any injury to the area. Patient is able to bear weight but it is painful. Patient states it is the bottom of his foot that is painful. Patient states it felt almost like a rheumatoid arthritis flare. Patient denies any fever chills, numbness, tingling, chest pain, shortness of breath, URI symptoms, nausea, vomiting, abdominal pain, diarrhea, constipation. Patient has not had any pain medication for symptoms. Patient smokes less than a pack per day. Patient drinks a couple beers daily. Patient denies any street drug abuse. Patient is noted to have pin point open area draining yellow, clear liquid.  Patient states this area was not previously open.  Discussed treatment plan, states understanding, and agrees     Hospital Course    #Septic arthritis (Multi)  -WBC 12.4 > 9.2 > 10.6>11.5  -CRP 3.75  -Sed Rate 48  -Lactate 1.4  -Xray Left Foot: Multifocal subluxations of the MTP joints as detailed above with severe hallux valgus deformity. Superimposed subarticular bone destruction at the 2nd MTP joint suspicious for septic arthritis in the appropriate clinical setting. Diffuse soft tissue swelling of the forefoot.  - vasc TORO no evidence for arterial occlusive disease   -PT Eval and Treat  -Vancomycin IV BID (Day 4)  -Rocephin 1 g IV Daily (Day 4)  -Podiatry consult,  appreciate recs   -MRI: Deep plantar soft tissue ulcers at the level of the 2nd  metatarsophalangeal joints with extensive osteomyelitis of the 2nd  proximal phalanx and of the head and distal diaphysis of the 2nd  Metatarsal. Shallow ulcer at the level of the 5th MTP joint with osteomyelitis involving the plantar aspect of the 5th metatarsal head.  Evidence of septic arthritis of the 2nd and 5th metatarsophalangeal joints. These are both dorsally subluxed. Loculated rim enhancing fluid collection adjacent to the 5th metatarsal head, consistent with a small abscess and which may be contiguous with the 5th metatarsophalangeal joint. Severe destructive arthropathy with subluxation of the 1st through 5th metatarsophalangeal joints.  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)  -continue Arava 20 mg PO Daily   -continue Xeljanz 11 mg PO Daily      #Depression  -continue Lexapro 30 mg PO Daily      #Hypertension  -Bps stable since admission  -continue Lisinopril 10 mg PO BID  -continue Lopressor 50 mg PO BID     #Vitamin D deficiency  -continue Vitamin D 2,000 units PO Daily      #Uncomplicated alcohol dependence (Multi)  - [facility] Protocol   - Folic Acid 1 mg PO Daily  - Multivitamin Daily   - Vitamin B-1 100 mg PO Daily      #Hyperlipidemia  - , VLDL 46, trig 232, chol 217 (10/4/2023)  - continue Lipitor 40 mg PO Daily      DVT Prophylaxis: Lovenox       Code Status: Full Code     Disposition: Patient was stable to be discharged to home. He was discharged on cefuroxime, percocet, aspirin. He will follow up with Dr. Tracy on Monday.    Total cumulative time spent in preparation of this discharge including documentation review, coordination of care with the medical team including PT/SW/care  coordinators and treating consultants, discussion with patient and pertinent family members and finalization of prescriptions, follow-up appointments, and this discharge summary was approximately 45 minutes.     Pertinent Physical Exam At Time of Discharge  Physical Exam  Vitals reviewed.   Constitutional:       Appearance: Normal appearance. He is obese.   HENT:      Head: Normocephalic and atraumatic.      Right Ear: External ear normal.      Left Ear: External ear normal.      Nose: Nose normal.      Mouth/Throat:      Mouth: Mucous membranes are moist.      Pharynx: Oropharynx is clear.   Eyes:      Conjunctiva/sclera: Conjunctivae normal.      Pupils: Pupils are equal, round, and reactive to light.   Cardiovascular:      Rate and Rhythm: Normal rate and regular rhythm.      Pulses: Normal pulses.      Heart sounds: Normal heart sounds.   Pulmonary:      Effort: Pulmonary effort is normal.      Breath sounds: Normal breath sounds.   Abdominal:      General: Bowel sounds are normal.      Palpations: Abdomen is soft.   Musculoskeletal:         General: Tenderness present. Normal range of motion.      Cervical back: Normal range of motion and neck supple.      Comments: Left foot dressing dry and intact, SHARATH drain draining serosanguinous fluid   Skin:     General: Skin is warm and dry.   Neurological:      General: No focal deficit present.      Mental Status: He is alert and oriented to person, place, and time.   Psychiatric:         Behavior: Behavior normal.         Outpatient Follow-Up  Future Appointments   Date Time Provider Department [place]   10/29/2024  3:00 PM REZA Gregory [id number] [facility]   11/18/2024  2:40 PM Marissa Adams MD [id number] [facility]         KRISTINE Logan-RACHEL

## 2024-09-12 LAB — STAPHYLOCOCCUS SPEC CULT: NORMAL

## 2024-09-13 ENCOUNTER — PATIENT OUTREACH (OUTPATIENT)
Dept: CARE COORDINATION | Facility: CLINIC | Age: 54
End: 2024-09-13
Payer: COMMERCIAL

## 2024-09-13 ENCOUNTER — PHARMACY VISIT (OUTPATIENT)
Dept: PHARMACY | Facility: CLINIC | Age: 54
End: 2024-09-13
Payer: COMMERCIAL

## 2024-09-13 LAB
BACTERIA SPEC CULT: NORMAL
GRAM STN SPEC: NORMAL
GRAM STN SPEC: NORMAL

## 2024-09-13 PROCEDURE — RXMED WILLOW AMBULATORY MEDICATION CHARGE

## 2024-09-13 SDOH — ECONOMIC STABILITY: GENERAL: WOULD YOU LIKE HELP WITH ANY OF THE FOLLOWING NEEDS?: I DONT NEED HELP WITH ANY OF THESE

## 2024-09-13 SDOH — ECONOMIC STABILITY: FOOD INSECURITY
ARE ANY OF YOUR NEEDS URGENT? FOR EXAMPLE, UNCERTAINTY OF WHERE YOU WILL GET YOUR NEXT MEAL OR NOT HAVING THE MEDICATIONS YOU NEED TO TAKE TOMORROW.: NO

## 2024-09-13 NOTE — PROGRESS NOTES
Outreach call to patient to support a smooth transition of care from recent admission.    Discharge facility: [facility]  Discharge diagnosis: Septic arthritis (Multi), Anemia  Admission date: 9/7/24  Discharge date:  9/11/24    Specialist Appointment Date: podiatry 9/16/24 at 0800    Spoke with patient, reviewed discharge medications, discharge instructions, assessed social needs, and provided education on importance of follow-up appointment with provider.     See  Hospital Encounter and Summary, and Discharge assessment below for further details. Information obtained from discharge summary from EMR.    53 y.o. male presenting with left foot pain x 2 weeks. Patient denies any injury to the area. Patient is able to bear weight but it is painful. Patient states it is the bottom of his foot that is painful. Patient states it felt almost like a rheumatoid arthritis flare. Patient denies any fever chills, numbness, tingling, chest pain, shortness of breath, URI symptoms, nausea, vomiting, abdominal pain, diarrhea, constipation. Patient has not had any pain medication for symptoms. Patient smokes less than a pack per day. Patient drinks a couple beers daily. Patient denies any street drug abuse. Patient is noted to have pin point open area draining yellow, clear liquid.  Patient states this area was not previously open.  Discussed treatment plan, states understanding, and agrees      Hospital Course     #Septic arthritis (Multi)  -WBC 12.4 > 9.2 > 10.6>11.5  -CRP 3.75  -Sed Rate 48  -Lactate 1.4  -Xray Left Foot: Multifocal subluxations of the MTP joints as detailed above with severe hallux valgus deformity. Superimposed subarticular bone destruction at the 2nd MTP joint suspicious for septic arthritis in the appropriate clinical setting. Diffuse soft tissue swelling of the forefoot.  - vasc TORO no evidence for arterial occlusive disease   -PT Eval and Treat  -Vancomycin IV BID (Day 4)  -Rocephin 1 g IV Daily (Day  4)  -Podiatry consult, appreciate recs   -MRI: Deep plantar soft tissue ulcers at the level of the 2nd  metatarsophalangeal joints with extensive osteomyelitis of the 2nd  proximal phalanx and of the head and distal diaphysis of the 2nd  Metatarsal. Shallow ulcer at the level of the 5th MTP joint with osteomyelitis involving the plantar aspect of the 5th metatarsal head.  Evidence of septic arthritis of the 2nd and 5th metatarsophalangeal joints. These are both dorsally subluxed. Loculated rim enhancing fluid collection adjacent to the 5th metatarsal head, consistent with a small abscess and which may be contiguous with the 5th metatarsophalangeal joint. Severe destructive arthropathy with subluxation of the 1st through 5th metatarsophalangeal joints.  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)  -continue Arava 20 mg PO Daily   -continue Xeljanz 11 mg PO Daily      #Depression  -continue Lexapro 30 mg PO Daily      #Hypertension  -Bps stable since admission  -continue Lisinopril 10 mg PO BID  -continue Lopressor 50 mg PO BID     #Vitamin D deficiency  -continue Vitamin D 2,000 units PO Daily      #Uncomplicated alcohol dependence (Multi)  - [facility] Protocol   - Folic Acid 1 mg PO Daily  - Multivitamin Daily   - Vitamin B-1 100 mg PO Daily      #Hyperlipidemia  - , VLDL 46, trig 232, chol 217 (10/4/2023)  - continue Lipitor 40 mg PO Daily      Disposition: Patient was stable to be discharged to home. He was discharged on cefuroxime, percocet, aspirin. He will follow up with Dr. Tracy on Monday.    CM outreach:  Medications  Medications reviewed with patient/caregiver?: Yes (9/13/2024  3:16 PM)  Is the patient having any side effects they believe may be caused by any medication additions  or changes?: No (9/13/2024  3:16 PM)  Does the patient have all medications ordered at discharge?: Yes (9/13/2024  3:16 PM)  Prescription Comments: Patient is taking cefuroxime and aspirin without issue. Patient is not taking Percocet for pain, found ineffective. Pain is effectly managed with Tylenol and Ibuprofen. (9/13/2024  3:16 PM)  Is the patient taking all medications as directed (includes completed medication regime)?: Yes (9/13/2024  3:16 PM)    Appointments  Does the patient have a primary care provider?: Yes (9/13/2024  3:16 PM)  Care Management Interventions: Verified appointment date/time/provider (9/13/2024  3:16 PM)  Has the patient kept scheduled appointments due by today?: Not applicable (9/13/2024  3:16 PM)    Patient Teaching  Does the patient have access to their discharge instructions?: Yes (9/13/2024  3:16 PM)  Care Management Interventions: Reviewed instructions with patient (9/13/2024  3:16 PM)  What is the patient's perception of their health status since discharge?: Improving (9/13/2024  3:16 PM)  Is the patient/caregiver able to teach back the hierarchy of who to call/visit for symptoms/problems? PCP, Specialist, Home Health nurse, Urgent Care, ED, 911: Yes (9/13/2024  3:16 PM)    Wrap Up  Wrap Up Additional Comments: Patient reports that his foot pain is better today than it was yesterday. Patient is NWB on left foot. Has follow-up with podiatry next week. (9/13/2024  3:16 PM)    Will continue to monitor through transition period. Provided patient with my contact information for potential needs.    Keri Franklin RN BSN  [facility] Care Manager  [phone]

## 2024-09-25 ENCOUNTER — PATIENT OUTREACH (OUTPATIENT)
Dept: CARE COORDINATION | Facility: CLINIC | Age: 54
End: 2024-09-25
Payer: COMMERCIAL

## 2024-09-25 NOTE — PROGRESS NOTES
Outreach call to patient to support a smooth transition of care from recent admission.  Left voicemail message for patient with my contact information.    Keri Franklin RN BSN  [facility] Care Manager  [phone]

## 2024-09-30 ENCOUNTER — TELEPHONE (OUTPATIENT)
Dept: RHEUMATOLOGY | Facility: CLINIC | Age: 54
End: 2024-09-30
Payer: COMMERCIAL

## 2024-09-30 ENCOUNTER — PATIENT OUTREACH (OUTPATIENT)
Dept: CARE COORDINATION | Facility: CLINIC | Age: 54
End: 2024-09-30
Payer: COMMERCIAL

## 2024-09-30 NOTE — PROGRESS NOTES
Outreach call to patient following up on appointment with podiatrist.   Discussed appointment, reviewed medications, and discussed plan of care.  Patient states that his pain is minimal, is not taking anything for it. If pain increases, takes Tylenol or Advil. No change in weight bearing status, still NWB left foot.  Patient with no additional questions at this time.  Will continue to follow.       Keri Franklin RN BSN  [facility] Care Manager  [phone]

## 2024-09-30 NOTE — TELEPHONE ENCOUNTER
Called patient.  He had a procedure on 910 for septic arthritis.  No longer on antibiotics but still healing.  He is nonweightbearing.  He is back on leflunomide.  Told him just to continue with the leflunomide for now for a couple of weeks then may be able to reintroduce Xeljanz.

## 2024-10-07 ENCOUNTER — PHARMACY VISIT (OUTPATIENT)
Dept: PHARMACY | Facility: CLINIC | Age: 54
End: 2024-10-07
Payer: COMMERCIAL

## 2024-10-07 PROCEDURE — RXMED WILLOW AMBULATORY MEDICATION CHARGE

## 2024-10-14 ENCOUNTER — PATIENT OUTREACH (OUTPATIENT)
Dept: CARE COORDINATION | Facility: CLINIC | Age: 54
End: 2024-10-14
Payer: COMMERCIAL

## 2024-10-14 NOTE — PROGRESS NOTES
Reviewed patient chart 30 days after hospital discharge to support smooth transition of care. No admissions or ED visits noted in chart since hospital discharge. Patient is routinely following with rheumatology and podiatry. Patient with no additional needs noted. No additional outreach needed at this time.      Keri Franklin RN BSN  [facility] Care Manager  [phone]

## 2024-10-28 PROCEDURE — RXMED WILLOW AMBULATORY MEDICATION CHARGE

## 2024-10-29 ENCOUNTER — APPOINTMENT (OUTPATIENT)
Dept: DERMATOLOGY | Facility: CLINIC | Age: 54
End: 2024-10-29
Payer: COMMERCIAL

## 2024-10-31 ENCOUNTER — PHARMACY VISIT (OUTPATIENT)
Dept: PHARMACY | Facility: CLINIC | Age: 54
End: 2024-10-31
Payer: COMMERCIAL

## 2024-11-01 PROCEDURE — RXMED WILLOW AMBULATORY MEDICATION CHARGE

## 2024-11-05 ENCOUNTER — PHARMACY VISIT (OUTPATIENT)
Dept: PHARMACY | Facility: CLINIC | Age: 54
End: 2024-11-05
Payer: COMMERCIAL

## 2024-11-18 ENCOUNTER — APPOINTMENT (OUTPATIENT)
Dept: RHEUMATOLOGY | Facility: CLINIC | Age: 54
End: 2024-11-18
Payer: COMMERCIAL

## 2024-11-18 VITALS
HEART RATE: 77 BPM | SYSTOLIC BLOOD PRESSURE: 150 MMHG | DIASTOLIC BLOOD PRESSURE: 93 MMHG | WEIGHT: 239.4 LBS | OXYGEN SATURATION: 97 % | BODY MASS INDEX: 38.64 KG/M2

## 2024-11-18 DIAGNOSIS — M05.9 SEROPOSITIVE RHEUMATOID ARTHRITIS: ICD-10-CM

## 2024-11-18 DIAGNOSIS — Z79.899 ENCOUNTER FOR LONG-TERM (CURRENT) USE OF MEDICATIONS: Primary | ICD-10-CM

## 2024-11-18 PROCEDURE — 3080F DIAST BP >= 90 MM HG: CPT | Performed by: INTERNAL MEDICINE

## 2024-11-18 PROCEDURE — 99213 OFFICE O/P EST LOW 20 MIN: CPT | Performed by: INTERNAL MEDICINE

## 2024-11-18 PROCEDURE — 3077F SYST BP >= 140 MM HG: CPT | Performed by: INTERNAL MEDICINE

## 2024-11-18 RX ORDER — LEFLUNOMIDE 20 MG/1
20 TABLET ORAL DAILY
Qty: 90 TABLET | Refills: 3 | Status: SHIPPED | OUTPATIENT
Start: 2024-11-18 | End: 2025-11-18

## 2024-11-18 ASSESSMENT — ENCOUNTER SYMPTOMS
LOSS OF SENSATION IN FEET: 0
ANAL BLEEDING: 0
FATIGUE: 1
DEPRESSION: 0
SLEEP DISTURBANCE: 0
OCCASIONAL FEELINGS OF UNSTEADINESS: 0

## 2024-11-18 NOTE — PROGRESS NOTES
Subjective   Patient ID: Domo Arcos is a 53 y.o. male who presents for Follow-up.  HPI  Patient with history of seropositive rheumatoid arthritis.  Previously had been on Humira and methotrexate.  Had been found to have a cyst on his spine followed by neurosurgeon Dr. Rico.  Previously had been on allopurinol for elevated uric acid.    Patient was last seen in May.  Later on in September he started having pain in the left foot.  Had difficulty bearing weight and then the bottom of his foot started having drainage.  Was admitted for septic arthritis.  He was discharged on antibiotics.  He had held his Xeljanz and then after well was back on leflunomide.  He started back on his Xeljanz about 2 weeks ago.  He also went back to work about 2 weeks ago.  He was getting some increase in symptoms in his knees, hands.  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  He does not have to use his left foot.  Before the infection, he was doing well.      Review of Systems   Constitutional:  Positive for fatigue.   HENT:          No dental issues   Eyes:         Wears glasses, vision getting worse.     Respiratory:          +tob use   Cardiovascular:  Negative for chest pain.   Gastrointestinal:  Negative for anal bleeding.   Musculoskeletal:         Per HPI   Skin:         Cysts on back   Psychiatric/Behavioral:  Negative for sleep disturbance.        Objective   Physical Exam  GEN: NAD A&O x3 appropriate affect  EYES: no conjunctival redness, eyelids normal  LYMPH: no cervical lymphadenopathy  NEURO: 5/5 strength upper and lower extremities, DTR +2 bicep and patellar tendons  SKIN:no rashes  PULSES: +radials  TENDER POINTS: 0/18   MSK:  Chronic changes in the DIP PIP MCP and wrists but no swelling or tenderness of these joints.  Contracture of the right elbow mild decreased range of motion bilateral shoulders.  Has swelling more on the right knee than the left and hypertrophic changes medially.  Also has  some mild warmth on the right more than the left  Looked at his incisions from his left foot.  Still has a lot of edema.  There are some mild fluid but no pus.  Assessment/Plan        Seropositive rheumatoid arthritis. -Previously had been on Humira and methotrexate   -Currently on Xeljanz and leflunomide after having an incident of septic arthritis.  Told him that he needs to be more vigilant in the future.   -Reviewed his lab work from his hospitalization.  We will have him come back in 6 months or as needed.

## 2024-11-19 DIAGNOSIS — E78.5 DYSLIPIDEMIA: ICD-10-CM

## 2024-11-19 RX ORDER — ATORVASTATIN CALCIUM 40 MG/1
40 TABLET, FILM COATED ORAL DAILY
Qty: 90 TABLET | Refills: 0 | Status: CANCELLED | OUTPATIENT
Start: 2024-11-19 | End: 2025-05-18

## 2024-11-20 DIAGNOSIS — E78.5 DYSLIPIDEMIA: ICD-10-CM

## 2024-11-20 PROCEDURE — RXMED WILLOW AMBULATORY MEDICATION CHARGE

## 2024-11-20 RX ORDER — ATORVASTATIN CALCIUM 40 MG/1
40 TABLET, FILM COATED ORAL DAILY
Qty: 90 TABLET | Refills: 0 | Status: SHIPPED | OUTPATIENT
Start: 2024-11-20 | End: 2025-05-19

## 2024-11-21 ENCOUNTER — PHARMACY VISIT (OUTPATIENT)
Dept: PHARMACY | Facility: CLINIC | Age: 54
End: 2024-11-21
Payer: COMMERCIAL

## 2024-12-02 PROCEDURE — RXMED WILLOW AMBULATORY MEDICATION CHARGE

## 2024-12-04 ENCOUNTER — PHARMACY VISIT (OUTPATIENT)
Dept: PHARMACY | Facility: CLINIC | Age: 54
End: 2024-12-04
Payer: COMMERCIAL

## 2024-12-30 DIAGNOSIS — M05.9 SEROPOSITIVE RHEUMATOID ARTHRITIS: ICD-10-CM

## 2024-12-30 PROCEDURE — RXMED WILLOW AMBULATORY MEDICATION CHARGE

## 2024-12-30 RX ORDER — TOFACITINIB 11 MG/1
11 TABLET, FILM COATED, EXTENDED RELEASE ORAL DAILY
Qty: 30 TABLET | Refills: 5 | Status: SHIPPED | OUTPATIENT
Start: 2024-12-30 | End: 2025-06-28

## 2025-01-06 ENCOUNTER — SPECIALTY PHARMACY (OUTPATIENT)
Dept: PHARMACY | Facility: CLINIC | Age: 55
End: 2025-01-06

## 2025-01-06 ENCOUNTER — PHARMACY VISIT (OUTPATIENT)
Dept: PHARMACY | Facility: CLINIC | Age: 55
End: 2025-01-06
Payer: COMMERCIAL

## 2025-01-16 DIAGNOSIS — D50.8 OTHER IRON DEFICIENCY ANEMIA: ICD-10-CM

## 2025-01-17 PROCEDURE — RXMED WILLOW AMBULATORY MEDICATION CHARGE

## 2025-01-17 RX ORDER — FERROUS SULFATE 325(65) MG
1 TABLET ORAL DAILY
Qty: 90 TABLET | Refills: 0 | OUTPATIENT
Start: 2025-01-17

## 2025-01-22 PROCEDURE — RXMED WILLOW AMBULATORY MEDICATION CHARGE

## 2025-01-29 PROCEDURE — RXMED WILLOW AMBULATORY MEDICATION CHARGE

## 2025-01-30 ENCOUNTER — PHARMACY VISIT (OUTPATIENT)
Dept: PHARMACY | Facility: CLINIC | Age: 55
End: 2025-01-30
Payer: COMMERCIAL

## 2025-02-07 ENCOUNTER — PHARMACY VISIT (OUTPATIENT)
Dept: PHARMACY | Facility: CLINIC | Age: 55
End: 2025-02-07
Payer: COMMERCIAL

## 2025-02-10 ENCOUNTER — APPOINTMENT (OUTPATIENT)
Dept: PRIMARY CARE | Facility: CLINIC | Age: 55
End: 2025-02-10
Payer: COMMERCIAL

## 2025-02-10 VITALS
HEART RATE: 92 BPM | WEIGHT: 235.6 LBS | SYSTOLIC BLOOD PRESSURE: 120 MMHG | BODY MASS INDEX: 38.03 KG/M2 | OXYGEN SATURATION: 98 % | TEMPERATURE: 96.6 F | DIASTOLIC BLOOD PRESSURE: 74 MMHG

## 2025-02-10 DIAGNOSIS — Z13.89 SCREENING FOR MULTIPLE CONDITIONS: ICD-10-CM

## 2025-02-10 DIAGNOSIS — I10 PRIMARY HYPERTENSION: ICD-10-CM

## 2025-02-10 DIAGNOSIS — Z13.0 SCREENING FOR DEFICIENCY ANEMIA: ICD-10-CM

## 2025-02-10 DIAGNOSIS — E78.5 DYSLIPIDEMIA: ICD-10-CM

## 2025-02-10 DIAGNOSIS — Z13.220 SCREENING FOR HYPERLIPIDEMIA: ICD-10-CM

## 2025-02-10 DIAGNOSIS — N52.9 ERECTILE DYSFUNCTION, UNSPECIFIED ERECTILE DYSFUNCTION TYPE: ICD-10-CM

## 2025-02-10 DIAGNOSIS — Z12.5 PROSTATE CANCER SCREENING: ICD-10-CM

## 2025-02-10 DIAGNOSIS — F41.9 ANXIETY: ICD-10-CM

## 2025-02-10 DIAGNOSIS — Z00.00 HEALTHCARE MAINTENANCE: Primary | ICD-10-CM

## 2025-02-10 DIAGNOSIS — Z13.1 DIABETES MELLITUS SCREENING: ICD-10-CM

## 2025-02-10 DIAGNOSIS — E55.9 VITAMIN D DEFICIENCY: ICD-10-CM

## 2025-02-10 DIAGNOSIS — D50.8 OTHER IRON DEFICIENCY ANEMIA: ICD-10-CM

## 2025-02-10 DIAGNOSIS — R53.83 OTHER FATIGUE: ICD-10-CM

## 2025-02-10 DIAGNOSIS — Z13.29 THYROID DISORDER SCREENING: ICD-10-CM

## 2025-02-10 PROCEDURE — 3074F SYST BP LT 130 MM HG: CPT

## 2025-02-10 PROCEDURE — RXMED WILLOW AMBULATORY MEDICATION CHARGE

## 2025-02-10 PROCEDURE — 3078F DIAST BP <80 MM HG: CPT

## 2025-02-10 PROCEDURE — 99396 PREV VISIT EST AGE 40-64: CPT

## 2025-02-10 RX ORDER — METOPROLOL TARTRATE 50 MG/1
50 TABLET ORAL 2 TIMES DAILY
Qty: 180 TABLET | Refills: 1 | Status: SHIPPED | OUTPATIENT
Start: 2025-02-10

## 2025-02-10 RX ORDER — LISINOPRIL 10 MG/1
10 TABLET ORAL 2 TIMES DAILY
Qty: 360 TABLET | Refills: 0 | Status: SHIPPED | OUTPATIENT
Start: 2025-02-10

## 2025-02-10 RX ORDER — ESCITALOPRAM OXALATE 20 MG/1
30 TABLET ORAL DAILY
Qty: 135 TABLET | Refills: 1 | Status: SHIPPED | OUTPATIENT
Start: 2025-02-10

## 2025-02-10 RX ORDER — FERROUS SULFATE 325(65) MG
1 TABLET ORAL DAILY
Qty: 90 TABLET | Refills: 1 | Status: SHIPPED | OUTPATIENT
Start: 2025-02-10

## 2025-02-10 RX ORDER — ATORVASTATIN CALCIUM 40 MG/1
40 TABLET, FILM COATED ORAL DAILY
Qty: 90 TABLET | Refills: 1 | Status: SHIPPED | OUTPATIENT
Start: 2025-02-10 | End: 2025-08-09

## 2025-02-10 RX ORDER — SILDENAFIL 50 MG/1
50 TABLET, FILM COATED ORAL DAILY PRN
Qty: 12 TABLET | Refills: 3 | Status: SHIPPED | OUTPATIENT
Start: 2025-02-10 | End: 2026-02-10

## 2025-02-10 ASSESSMENT — ENCOUNTER SYMPTOMS
SHORTNESS OF BREATH: 0
GASTROINTESTINAL NEGATIVE: 1
ACTIVITY CHANGE: 0
FATIGUE: 1
DIZZINESS: 0
CARDIOVASCULAR NEGATIVE: 1
RESPIRATORY NEGATIVE: 1
ALLERGIC/IMMUNOLOGIC NEGATIVE: 1
FEVER: 0
UNEXPECTED WEIGHT CHANGE: 0
WEAKNESS: 0
MUSCULOSKELETAL NEGATIVE: 1
HEADACHES: 0
ABDOMINAL PAIN: 0
PSYCHIATRIC NEGATIVE: 1
ENDOCRINE NEGATIVE: 1

## 2025-02-10 ASSESSMENT — PATIENT HEALTH QUESTIONNAIRE - PHQ9
SUM OF ALL RESPONSES TO PHQ9 QUESTIONS 1 AND 2: 0
2. FEELING DOWN, DEPRESSED OR HOPELESS: NOT AT ALL
1. LITTLE INTEREST OR PLEASURE IN DOING THINGS: NOT AT ALL

## 2025-02-10 NOTE — PROGRESS NOTES
Subjective   Patient ID: Domo Arcos is a 54 y.o. male who presents for Med Refill.  HPI    Domo presents for follow up and med refill  He feels well overall, does have decrease in libido over the years  History of low testosterone, however meds that were tried before were not covered by insurance    Also having erectile dysfunction, start viagra     Blood work ordered for wellness  Declined flu vaccine    Vitals:    02/10/25 1601   BP: 120/74   Pulse: 92   Temp: 35.9 °C (96.6 °F)   SpO2: 98%       Review of Systems   Constitutional:  Positive for fatigue. Negative for activity change, fever and unexpected weight change.   HENT: Negative.     Respiratory: Negative.  Negative for shortness of breath.    Cardiovascular: Negative.  Negative for chest pain.   Gastrointestinal: Negative.  Negative for abdominal pain.   Endocrine: Negative.    Genitourinary:         Erectile dysfunction.   Musculoskeletal: Negative.    Skin: Negative.    Allergic/Immunologic: Negative.    Neurological:  Negative for dizziness, weakness and headaches.   Psychiatric/Behavioral: Negative.         Objective   Physical Exam  Vitals and nursing note reviewed.   Constitutional:       Appearance: Normal appearance.   HENT:      Head: Normocephalic.      Mouth/Throat:      Mouth: Mucous membranes are moist.   Cardiovascular:      Rate and Rhythm: Normal rate and regular rhythm.      Pulses: Normal pulses.      Heart sounds: Normal heart sounds. No murmur heard.     No friction rub. No gallop.   Pulmonary:      Effort: Pulmonary effort is normal. No respiratory distress.      Breath sounds: Normal breath sounds. No wheezing.   Abdominal:      General: Bowel sounds are normal. There is no distension.      Palpations: Abdomen is soft.      Tenderness: There is no abdominal tenderness.   Musculoskeletal:         General: No deformity. Normal range of motion.   Skin:     General: Skin is warm and dry.      Capillary Refill: Capillary refill takes  less than 2 seconds.   Neurological:      General: No focal deficit present.      Mental Status: He is alert and oriented to person, place, and time.   Psychiatric:         Mood and Affect: Mood normal.         Assessment/Plan   Problem List Items Addressed This Visit       Dyslipidemia    Relevant Medications    atorvastatin (Lipitor) 40 mg tablet    Fatigue    Relevant Orders    Testosterone,Free and Total    Hypertension    Relevant Medications    lisinopril 10 mg tablet    metoprolol tartrate (Lopressor) 50 mg tablet    Vitamin D deficiency - Primary    Relevant Orders    Vitamin D 25-Hydroxy,Total (for eval of Vitamin D levels)    Anxiety    Relevant Medications    escitalopram (Lexapro) 20 mg tablet    Iron deficiency anemia    Relevant Medications    ferrous sulfate, 325 mg ferrous sulfate, (FeroSuL) tablet     Other Visit Diagnoses       Screening for multiple conditions        Relevant Orders    CBC and Auto Differential    Healthcare maintenance        Relevant Orders    Comprehensive Metabolic Panel    Screening for hyperlipidemia        Relevant Orders    Lipid Panel    Thyroid disorder screening        Relevant Orders    TSH with reflex to Free T4 if abnormal    Screening for deficiency anemia        Relevant Orders    Vitamin B12    Diabetes mellitus screening        Relevant Orders    Hemoglobin A1C    Prostate cancer screening        Relevant Orders    PSA, total and free    Erectile dysfunction, unspecified erectile dysfunction type        Relevant Medications    sildenafil (Viagra) 50 mg tablet                 Thank you for coming in today, please call my office if you have any concerns or questions.     Socrates CARMONA, CNP

## 2025-02-13 ENCOUNTER — PHARMACY VISIT (OUTPATIENT)
Dept: PHARMACY | Facility: CLINIC | Age: 55
End: 2025-02-13
Payer: COMMERCIAL

## 2025-02-13 PROCEDURE — RXMED WILLOW AMBULATORY MEDICATION CHARGE

## 2025-02-28 PROCEDURE — RXMED WILLOW AMBULATORY MEDICATION CHARGE

## 2025-03-04 ENCOUNTER — PHARMACY VISIT (OUTPATIENT)
Dept: PHARMACY | Facility: CLINIC | Age: 55
End: 2025-03-04
Payer: COMMERCIAL

## 2025-03-09 LAB
25(OH)D3+25(OH)D2 SERPL-MCNC: 54 NG/ML (ref 30–100)
ALBUMIN SERPL-MCNC: 3.7 G/DL (ref 3.6–5.1)
ALP SERPL-CCNC: 84 U/L (ref 35–144)
ALT SERPL-CCNC: 22 U/L (ref 9–46)
ANION GAP SERPL CALCULATED.4IONS-SCNC: 8 MMOL/L (CALC) (ref 7–17)
AST SERPL-CCNC: 27 U/L (ref 10–35)
BASOPHILS # BLD AUTO: 69 CELLS/UL (ref 0–200)
BASOPHILS NFR BLD AUTO: 1 %
BILIRUB SERPL-MCNC: 0.5 MG/DL (ref 0.2–1.2)
BUN SERPL-MCNC: 15 MG/DL (ref 7–25)
CALCIUM SERPL-MCNC: 8.7 MG/DL (ref 8.6–10.3)
CHLORIDE SERPL-SCNC: 104 MMOL/L (ref 98–110)
CHOLEST SERPL-MCNC: 148 MG/DL
CHOLEST/HDLC SERPL: 4.4 (CALC)
CO2 SERPL-SCNC: 28 MMOL/L (ref 20–32)
CREAT SERPL-MCNC: 0.78 MG/DL (ref 0.7–1.3)
EGFRCR SERPLBLD CKD-EPI 2021: 107 ML/MIN/1.73M2
EOSINOPHIL # BLD AUTO: 228 CELLS/UL (ref 15–500)
EOSINOPHIL NFR BLD AUTO: 3.3 %
ERYTHROCYTE [DISTWIDTH] IN BLOOD BY AUTOMATED COUNT: 13.2 % (ref 11–15)
EST. AVERAGE GLUCOSE BLD GHB EST-MCNC: 117 MG/DL
EST. AVERAGE GLUCOSE BLD GHB EST-SCNC: 6.5 MMOL/L
GLUCOSE SERPL-MCNC: 80 MG/DL (ref 65–99)
HBA1C MFR BLD: 5.7 % OF TOTAL HGB
HCT VFR BLD AUTO: 39.7 % (ref 38.5–50)
HDLC SERPL-MCNC: 34 MG/DL
HGB BLD-MCNC: 13.2 G/DL (ref 13.2–17.1)
LDLC SERPL CALC-MCNC: ABNORMAL MG/DL
LYMPHOCYTES # BLD AUTO: 1553 CELLS/UL (ref 850–3900)
LYMPHOCYTES NFR BLD AUTO: 22.5 %
MCH RBC QN AUTO: 28.9 PG (ref 27–33)
MCHC RBC AUTO-ENTMCNC: 33.2 G/DL (ref 32–36)
MCV RBC AUTO: 87.1 FL (ref 80–100)
MONOCYTES # BLD AUTO: 807 CELLS/UL (ref 200–950)
MONOCYTES NFR BLD AUTO: 11.7 %
NEUTROPHILS # BLD AUTO: 4244 CELLS/UL (ref 1500–7800)
NEUTROPHILS NFR BLD AUTO: 61.5 %
NONHDLC SERPL-MCNC: 114 MG/DL (CALC)
PLATELET # BLD AUTO: 208 THOUSAND/UL (ref 140–400)
PMV BLD REES-ECKER: 13.4 FL (ref 7.5–12.5)
POTASSIUM SERPL-SCNC: 4.6 MMOL/L (ref 3.5–5.3)
PROT SERPL-MCNC: 6.5 G/DL (ref 6.1–8.1)
PSA FREE MFR SERPL: NORMAL %
PSA FREE SERPL-MCNC: NORMAL NG/ML
PSA SERPL-MCNC: NORMAL NG/ML
RBC # BLD AUTO: 4.56 MILLION/UL (ref 4.2–5.8)
SODIUM SERPL-SCNC: 140 MMOL/L (ref 135–146)
TESTOST FREE SERPL-MCNC: NORMAL PG/ML
TESTOST SERPL-MCNC: NORMAL NG/DL
TRIGL SERPL-MCNC: 512 MG/DL
TSH SERPL-ACNC: 1.04 MIU/L (ref 0.4–4.5)
VIT B12 SERPL-MCNC: 342 PG/ML (ref 200–1100)
WBC # BLD AUTO: 6.9 THOUSAND/UL (ref 3.8–10.8)

## 2025-03-15 LAB
25(OH)D3+25(OH)D2 SERPL-MCNC: 54 NG/ML (ref 30–100)
ALBUMIN SERPL-MCNC: 3.7 G/DL (ref 3.6–5.1)
ALP SERPL-CCNC: 84 U/L (ref 35–144)
ALT SERPL-CCNC: 22 U/L (ref 9–46)
ANION GAP SERPL CALCULATED.4IONS-SCNC: 8 MMOL/L (CALC) (ref 7–17)
AST SERPL-CCNC: 27 U/L (ref 10–35)
BASOPHILS # BLD AUTO: 69 CELLS/UL (ref 0–200)
BASOPHILS NFR BLD AUTO: 1 %
BILIRUB SERPL-MCNC: 0.5 MG/DL (ref 0.2–1.2)
BUN SERPL-MCNC: 15 MG/DL (ref 7–25)
CALCIUM SERPL-MCNC: 8.7 MG/DL (ref 8.6–10.3)
CHLORIDE SERPL-SCNC: 104 MMOL/L (ref 98–110)
CHOLEST SERPL-MCNC: 148 MG/DL
CHOLEST/HDLC SERPL: 4.4 (CALC)
CO2 SERPL-SCNC: 28 MMOL/L (ref 20–32)
CREAT SERPL-MCNC: 0.78 MG/DL (ref 0.7–1.3)
EGFRCR SERPLBLD CKD-EPI 2021: 107 ML/MIN/1.73M2
EOSINOPHIL # BLD AUTO: 228 CELLS/UL (ref 15–500)
EOSINOPHIL NFR BLD AUTO: 3.3 %
ERYTHROCYTE [DISTWIDTH] IN BLOOD BY AUTOMATED COUNT: 13.2 % (ref 11–15)
EST. AVERAGE GLUCOSE BLD GHB EST-MCNC: 117 MG/DL
EST. AVERAGE GLUCOSE BLD GHB EST-SCNC: 6.5 MMOL/L
GLUCOSE SERPL-MCNC: 80 MG/DL (ref 65–99)
HBA1C MFR BLD: 5.7 % OF TOTAL HGB
HCT VFR BLD AUTO: 39.7 % (ref 38.5–50)
HDLC SERPL-MCNC: 34 MG/DL
HGB BLD-MCNC: 13.2 G/DL (ref 13.2–17.1)
LDLC SERPL CALC-MCNC: ABNORMAL MG/DL
LYMPHOCYTES # BLD AUTO: 1553 CELLS/UL (ref 850–3900)
LYMPHOCYTES NFR BLD AUTO: 22.5 %
MCH RBC QN AUTO: 28.9 PG (ref 27–33)
MCHC RBC AUTO-ENTMCNC: 33.2 G/DL (ref 32–36)
MCV RBC AUTO: 87.1 FL (ref 80–100)
MONOCYTES # BLD AUTO: 807 CELLS/UL (ref 200–950)
MONOCYTES NFR BLD AUTO: 11.7 %
NEUTROPHILS # BLD AUTO: 4244 CELLS/UL (ref 1500–7800)
NEUTROPHILS NFR BLD AUTO: 61.5 %
NONHDLC SERPL-MCNC: 114 MG/DL (CALC)
PLATELET # BLD AUTO: 208 THOUSAND/UL (ref 140–400)
PMV BLD REES-ECKER: 13.4 FL (ref 7.5–12.5)
POTASSIUM SERPL-SCNC: 4.6 MMOL/L (ref 3.5–5.3)
PROT SERPL-MCNC: 6.5 G/DL (ref 6.1–8.1)
PSA FREE MFR SERPL: 10 % (CALC)
PSA FREE SERPL-MCNC: 0.1 NG/ML
PSA SERPL-MCNC: 1 NG/ML
RBC # BLD AUTO: 4.56 MILLION/UL (ref 4.2–5.8)
SODIUM SERPL-SCNC: 140 MMOL/L (ref 135–146)
TESTOST FREE SERPL-MCNC: 41.2 PG/ML (ref 35–155)
TESTOST SERPL-MCNC: 266 NG/DL (ref 250–1100)
TRIGL SERPL-MCNC: 512 MG/DL
TSH SERPL-ACNC: 1.04 MIU/L (ref 0.4–4.5)
VIT B12 SERPL-MCNC: 342 PG/ML (ref 200–1100)
WBC # BLD AUTO: 6.9 THOUSAND/UL (ref 3.8–10.8)

## 2025-03-18 DIAGNOSIS — E78.2 MODERATE MIXED HYPERLIPIDEMIA NOT REQUIRING STATIN THERAPY: Primary | ICD-10-CM

## 2025-03-31 PROCEDURE — RXMED WILLOW AMBULATORY MEDICATION CHARGE

## 2025-04-02 ENCOUNTER — PHARMACY VISIT (OUTPATIENT)
Dept: PHARMACY | Facility: CLINIC | Age: 55
End: 2025-04-02
Payer: COMMERCIAL

## 2025-04-23 ENCOUNTER — PHARMACY VISIT (OUTPATIENT)
Dept: PHARMACY | Facility: CLINIC | Age: 55
End: 2025-04-23
Payer: COMMERCIAL

## 2025-04-23 ENCOUNTER — OFFICE VISIT (OUTPATIENT)
Dept: PRIMARY CARE | Facility: CLINIC | Age: 55
End: 2025-04-23
Payer: COMMERCIAL

## 2025-04-23 VITALS
OXYGEN SATURATION: 97 % | BODY MASS INDEX: 39.43 KG/M2 | HEART RATE: 80 BPM | SYSTOLIC BLOOD PRESSURE: 130 MMHG | TEMPERATURE: 96.4 F | DIASTOLIC BLOOD PRESSURE: 90 MMHG | WEIGHT: 244.3 LBS

## 2025-04-23 DIAGNOSIS — N63.42 SUBAREOLAR MASS OF LEFT BREAST: Primary | ICD-10-CM

## 2025-04-23 DIAGNOSIS — E29.1 HYPOGONADISM MALE: ICD-10-CM

## 2025-04-23 DIAGNOSIS — F41.9 ANXIETY: ICD-10-CM

## 2025-04-23 DIAGNOSIS — E29.1 HYPOGONADISM IN MALE: ICD-10-CM

## 2025-04-23 PROCEDURE — 3075F SYST BP GE 130 - 139MM HG: CPT

## 2025-04-23 PROCEDURE — RXMED WILLOW AMBULATORY MEDICATION CHARGE

## 2025-04-23 PROCEDURE — 3080F DIAST BP >= 90 MM HG: CPT

## 2025-04-23 PROCEDURE — 99214 OFFICE O/P EST MOD 30 MIN: CPT

## 2025-04-23 RX ORDER — FLUOXETINE HYDROCHLORIDE 20 MG/1
20 CAPSULE ORAL DAILY
Qty: 30 CAPSULE | Refills: 1 | Status: SHIPPED | OUTPATIENT
Start: 2025-04-23 | End: 2025-06-22

## 2025-04-23 RX ORDER — TESTOSTERONE CYPIONATE 200 MG/ML
100 INJECTION, SOLUTION INTRAMUSCULAR
Qty: 1 ML | Refills: 0 | Status: SHIPPED | OUTPATIENT
Start: 2025-04-23 | End: 2026-01-28

## 2025-04-23 NOTE — PROGRESS NOTES
Subjective   Patient ID: Domo Arcos is a 54 y.o. male who presents for Breast Mass (L breast, has been there for about a year. States it is starting to become painful ) and Medication Problem (Reports his mood meds are not helping him anymore. ).    Subjective  Domo Arcos is a 54 y.o. male who presents for follow up of depression. Current symptoms include depressed mood, difficulty concentrating, and fatigue. Symptoms have been gradually worsening since that time. Patient denies suicidal attempt, suicidal thoughts with specific plan, and suicidal thoughts without plan. Previous treatment includes: medication. He complains of the following side effects from the treatment: none.    Objective  /90   Pulse 80   Temp 35.8 °C (96.4 °F)   Wt 111 kg (244 lb 4.8 oz)   SpO2 97%   BMI 39.43 kg/m²    General:  alert and oriented, in no acute distress  Affect & Behavior:  full facial expressions, good grooming, and good insight     Assessment/Plan  Depression, gradually worsening.  Medications: Prozac.  Reviewed concept of anxiety as biochemical imbalance of neurotransmitters and rationale for treatment.  Instructed patient to contact office or on-call physician promptly should condition worsen or any new symptoms appear and provided on-call telephone numbers. IF THE PATIENT HAS ANY SUICIDAL OR HOMICIDAL IDEATIONS, CALL THE OFFICE, DISCUSS WITH A SUPPORT MEMBER, OR GO TO THE ER IMMEDIATELY. Patient was agreeable with this plan.  Spent 25 minutes (>50% of visit) discussing the risks of anxiety disorder, the  pathophysiology, etiology, risks, and principles of treatment.  Follow up in 4 weeks    Testosterone:  What is the patient's goal of therapy? Increased energy, libido, and mood stabilization  Is this being achieved with current treatment? Starting now.    I attest the patient does not have: Breast Cancer, Polycythemia, or Prostate Cancer    Last Testosterone check:  TESTOSTERONE, FREE       Date                      Value               Ref Range           Status                03/08/2025               41.2                35.0 - 155.0 p*     Final              Comment:       This test was developed and its analytical performance  characteristics have been determined by [employer] [place], [place]. It has  not been cleared or approved by the U.S. Food and Drug  Administration. This assay has been validated pursuant  to the CLIA regulations and is used for clinical  purposes.       ----------  TESTOSTERONE, TOTAL, MS       Date                     Value               Ref Range           Status                03/08/2025               266                 250 - 1,100 ng*     Final              Comment:       For additional information, please refer to  http://[employer]/faq/  [email or web address]  (This link is being provided for informational/  educational purposes only.)     This test was developed and its analytical performance  characteristics have been determined by [employer] [place], [place]. It has  not been cleared or approved by the U.S. Food and Drug  Administration. This assay has been validated pursuant  to the CLIA regulations and is used for clinical  purposes.       ----------  Testosterone, Total, LC-MS/MS       Date                     Value               Ref Range           Status                10/04/2023               408                 250 - 1100 ng/*     Final              Comment:       For additional information, please refer to  http://[employer]/faq/  [id number]  (This link is being provided for informational/  educational purposes only.)     This test was developed and its analytical performance  characteristics have been determined by [employer] [place], [place]. It has  not been cleared or approved by the U.S. Food and Drug  Administration. This assay has been  "validated pursuant  to the CLIA regulations and is used for clinical  purposes.         ----------    Last CBC:    No results found for: \"CBCDIF\", \"BMCBC\", \"PR1\"    Last PSA:   PSA, TOTAL       Date                     Value               Ref Range           Status                03/08/2025               1.0                 < OR = 4.0 ng/*     Final            ----------  PSA, FREE       Date                     Value               Ref Range           Status                03/08/2025               0.1                 ng/mL               Final            ----------  PSA, % FREE       Date                     Value               Ref Range           Status                03/08/2025               10 (L)              >25 % (calc)        Final              Comment:       PSA(ng/mL)      Free PSA(%)     Estimated(x) Probability                                       of Cancer(as%)  0-2.5              (*)               Approx. 1  2.6-4.0(1)         0-27(2)                   24(3)  4.1-10(4)          0-10                      56                     11-15                     28                     16-20                     20                     21-25                     16                     >or =26                   8  >10(+)             N/A                      >50     References:(1)Kenny et al.:Urology 60: 469-474 (2002)             (2)Dirkona et al.:J.Urol 168: 922-925 (2002)                Free PSA(%)   Sensitivity(%)  Specificity(%)                < or = 25          85              19                < or = 30          93               9             (3)Dirkona et al.:SARAH 277: 5949-5816 (1997)             (4)Catalona et al.:SARAH 279: 2910-6317 (1998)     (x)These estimates vary with age, ethnicity, family      history and DELROY results.  (*)The diagnostic usefulness of % Free PSA has not been     established in patients with total PSA below 2.6 ng/mL  (+)In men with PSA above 10 ng/mL, prostate cancer risk is     " determined by total PSA alone.     The Total PSA value from this assay system is   standardized against the equimolar PSA standard.   The test result will be approximately 20% higher   when compared to the WHO-standardized Total PSA   (Siemens assay). Comparison of serial PSA results   should be interpreted with this fact in mind.     PSA was performed using the Dionicio Alden  Immunoassay method. Values obtained from different  assay methods cannot be used interchangeably. PSA  levels, regardless of value, should not be interpreted  as absolute evidence of the presence or absence of  disease.       ----------  Prostate Specific Antigen,Screen       Date                     Value               Ref Range           Status                12/09/2022               0.87                0.00 - 4.00 ng*     Final              Comment:    The FDA requires that the method used for PSA assay be   reported to the physician. Values obtained with different   assay methods must not be used interchangeably. This test   was performed at [facility] using the Siemens  Atellica PSA method, which is a sandwich immunoassay using   chemiluminescence for quantitation. The assay is approved  for measurement of prostate-specific antigen (PSA) in   serum and may be used in conjunction with a digital rectal  examination in men 50 years and older as an aid in   detection of prostate cancer.   5-Alpha-reductase inhibitors (e.g. Proscar, Finasteride,   Avodart, Dutasteride and Marilee) for the treatment of BPH   have been shown to lower PSA levels by an average of 50%   after 6 months of treatment.    ----------    Activities of Daily Living:   Is your overall impression that this patient is benefiting (symptom reduction outweighs side effects) from testosterone therapy? Starting now.      Breast pain  Mass of areola, concern for cyst  Obtain US of breast and follow  Do not start T therapy until malignancy is RULED  OUT              Vitals:    04/23/25 0940   BP: 130/90   Pulse: 80   Temp: 35.8 °C (96.4 °F)   SpO2: 97%       Review of Systems    Objective   Physical Exam  Chest:         Assessment/Plan   Problem List Items Addressed This Visit       Hypogonadism male    Anxiety    Relevant Medications    FLUoxetine (PROzac) 20 mg capsule     Other Visit Diagnoses         Subareolar mass of left breast    -  Primary    Relevant Orders    BI US breast complete left      Hypogonadism in male        Relevant Medications    testosterone cypionate (Depo-Testosterone) 100 mg/mL injection    Other Relevant Orders    CBC    PSA, Total and Free    Testosterone,Free and Total    Drug Screen, Urine With Reflex to Confirmation                 Thank you for coming in today, please call my office if you have any concerns or questions.     Socrates CARMONA, CNP

## 2025-04-23 NOTE — PATIENT INSTRUCTIONS
See in 1 month mood follow up  Stop lexapro start prozac tomorrow    US breast tissue to evaluate the mass    Testosterone injection next week. schedule with me  UDS now    Thank you for coming in today, if any questions or concerns arise, please call my office.   Socrates Castañeda, KRISTINE-CNP

## 2025-04-25 LAB
AMPHETAMINES UR QL: NEGATIVE NG/ML
BARBITURATES UR QL: NEGATIVE NG/ML
BENZODIAZ UR QL: NEGATIVE NG/ML
BZE UR QL: NEGATIVE NG/ML
CREAT UR-MCNC: 289.6 MG/DL
DRUG SCREEN COMMENT UR-IMP: ABNORMAL
FENTANYL UR QL SCN: NEGATIVE NG/ML
METHADONE UR QL: NEGATIVE NG/ML
OPIATES UR QL: NEGATIVE NG/ML
OXIDANTS UR QL: NEGATIVE MCG/ML
OXYCODONE UR QL: NEGATIVE NG/ML
PCP UR QL: NEGATIVE NG/ML
PH UR: 5.5 [PH] (ref 4.5–9)
QUEST NOTES AND COMMENTS: ABNORMAL
THC UR QL: POSITIVE NG/ML
THC UR-MCNC: 24 NG/ML

## 2025-04-28 PROCEDURE — RXMED WILLOW AMBULATORY MEDICATION CHARGE

## 2025-04-29 ENCOUNTER — PHARMACY VISIT (OUTPATIENT)
Dept: PHARMACY | Facility: CLINIC | Age: 55
End: 2025-04-29
Payer: COMMERCIAL

## 2025-05-01 ENCOUNTER — APPOINTMENT (OUTPATIENT)
Dept: PRIMARY CARE | Facility: CLINIC | Age: 55
End: 2025-05-01
Payer: COMMERCIAL

## 2025-05-01 PROCEDURE — RXMED WILLOW AMBULATORY MEDICATION CHARGE

## 2025-05-02 ENCOUNTER — APPOINTMENT (OUTPATIENT)
Dept: PRIMARY CARE | Facility: CLINIC | Age: 55
End: 2025-05-02
Payer: COMMERCIAL

## 2025-05-02 VITALS
SYSTOLIC BLOOD PRESSURE: 126 MMHG | DIASTOLIC BLOOD PRESSURE: 84 MMHG | TEMPERATURE: 97.2 F | HEART RATE: 78 BPM | OXYGEN SATURATION: 97 % | BODY MASS INDEX: 39.8 KG/M2 | WEIGHT: 246.6 LBS

## 2025-05-02 DIAGNOSIS — E29.1 HYPOGONADISM MALE: Primary | ICD-10-CM

## 2025-05-02 RX ORDER — SYRINGE W-NEEDLE,DISPOSAB,3 ML 25GX5/8"
1 SYRINGE, EMPTY DISPOSABLE MISCELLANEOUS
Qty: 3 EACH | Refills: 0 | Status: SHIPPED | OUTPATIENT
Start: 2025-05-02

## 2025-05-02 NOTE — PROGRESS NOTES
"Subjective   Patient ID: Domo Arcos is a 54 y.o. male who presents for testosterone.  Encounter for testosterone injection  Given left gluteal muscle without complication  Left in stable condition        Vitals:    05/02/25 1538   BP: 126/84   Pulse: 78   Temp: 36.2 °C (97.2 °F)   SpO2: 97%       Review of Systems    Objective   Physical Exam    Assessment/Plan   Problem List Items Addressed This Visit       Hypogonadism male - Primary    Relevant Medications    syringe with needle (Syringe 3cc/22Gx3/4\") 3 mL 22 gauge x 3/4\" syringe            Thank you for coming in today, please call my office if you have any concerns or questions.     Socrates CARMONA, CNP  "

## 2025-05-04 RX ORDER — TESTOSTERONE CYPIONATE 1000 MG/10ML
100 INJECTION, SOLUTION INTRAMUSCULAR ONCE
Status: COMPLETED | OUTPATIENT
Start: 2025-05-04 | End: 2025-05-04

## 2025-05-04 RX ADMIN — TESTOSTERONE CYPIONATE 100 MG: 1000 INJECTION, SOLUTION INTRAMUSCULAR at 11:04

## 2025-05-07 ENCOUNTER — PHARMACY VISIT (OUTPATIENT)
Dept: PHARMACY | Facility: CLINIC | Age: 55
End: 2025-05-07
Payer: COMMERCIAL

## 2025-05-07 PROCEDURE — RXMED WILLOW AMBULATORY MEDICATION CHARGE

## 2025-05-08 ENCOUNTER — PHARMACY VISIT (OUTPATIENT)
Dept: PHARMACY | Facility: CLINIC | Age: 55
End: 2025-05-08
Payer: COMMERCIAL

## 2025-05-08 DIAGNOSIS — N63.42 SUBAREOLAR MASS OF LEFT BREAST: ICD-10-CM

## 2025-05-08 PROCEDURE — RXMED WILLOW AMBULATORY MEDICATION CHARGE

## 2025-05-09 ENCOUNTER — PHARMACY VISIT (OUTPATIENT)
Dept: PHARMACY | Facility: CLINIC | Age: 55
End: 2025-05-09
Payer: COMMERCIAL

## 2025-05-19 ENCOUNTER — APPOINTMENT (OUTPATIENT)
Dept: RHEUMATOLOGY | Facility: CLINIC | Age: 55
End: 2025-05-19
Payer: COMMERCIAL

## 2025-05-22 PROCEDURE — RXMED WILLOW AMBULATORY MEDICATION CHARGE

## 2025-05-23 ENCOUNTER — PHARMACY VISIT (OUTPATIENT)
Dept: PHARMACY | Facility: CLINIC | Age: 55
End: 2025-05-23
Payer: COMMERCIAL

## 2025-05-23 ENCOUNTER — APPOINTMENT (OUTPATIENT)
Dept: PRIMARY CARE | Facility: CLINIC | Age: 55
End: 2025-05-23
Payer: COMMERCIAL

## 2025-05-23 PROCEDURE — RXMED WILLOW AMBULATORY MEDICATION CHARGE

## 2025-05-27 ENCOUNTER — APPOINTMENT (OUTPATIENT)
Dept: PRIMARY CARE | Facility: CLINIC | Age: 55
End: 2025-05-27
Payer: COMMERCIAL

## 2025-05-27 VITALS
DIASTOLIC BLOOD PRESSURE: 100 MMHG | HEART RATE: 90 BPM | OXYGEN SATURATION: 96 % | WEIGHT: 245.7 LBS | TEMPERATURE: 97.2 F | BODY MASS INDEX: 39.66 KG/M2 | SYSTOLIC BLOOD PRESSURE: 160 MMHG

## 2025-05-27 DIAGNOSIS — F41.9 ANXIETY: ICD-10-CM

## 2025-05-27 PROCEDURE — 3077F SYST BP >= 140 MM HG: CPT

## 2025-05-27 PROCEDURE — 99214 OFFICE O/P EST MOD 30 MIN: CPT

## 2025-05-27 PROCEDURE — 3080F DIAST BP >= 90 MM HG: CPT

## 2025-05-27 PROCEDURE — RXMED WILLOW AMBULATORY MEDICATION CHARGE

## 2025-05-27 RX ORDER — FLUOXETINE 20 MG/1
40 CAPSULE ORAL DAILY
Qty: 60 CAPSULE | Refills: 0 | Status: SHIPPED | OUTPATIENT
Start: 2025-05-27 | End: 2025-06-26

## 2025-05-27 NOTE — PROGRESS NOTES
Subjective   Patient ID: Domo Arcos is a 54 y.o. male who presents for Follow-up (1 month mood follow up. Reports medication seems to be helping. ).    Subjective  Domo Arcos is a 54 y.o. male who presents for follow up of depression. Current symptoms include depressed mood and fatigue. Symptoms have been gradually improving since that time. Patient denies suicidal thoughts with specific plan and suicidal thoughts without plan. Previous treatment includes: medication. He complains of the following side effects from the treatment: none.    Objective  BP (!) 160/100 Comment: denies any cardiac symptoms  Pulse 90   Temp 36.2 °C (97.2 °F)   Wt 111 kg (245 lb 11.2 oz)   SpO2 96%   BMI 39.66 kg/m²    General:  alert and oriented, in no acute distress  Affect & Behavior:  full facial expressions, good grooming, and normal perception flattened affect    Assessment/Plan  Depression, gradually improving.  Medications: Prozac increase.          Vitals:    05/27/25 1541   BP: (!) 160/100   Pulse: 90   Temp: 36.2 °C (97.2 °F)   SpO2: 96%       Review of Systems    Objective   Physical Exam    Assessment/Plan   Problem List Items Addressed This Visit       Anxiety    Relevant Medications    FLUoxetine (PROzac) 20 mg capsule            Thank you for coming in today, please call my office if you have any concerns or questions.     Socrates CARMONA, CNP

## 2025-05-27 NOTE — PATIENT INSTRUCTIONS
Testosterone next dose next week  Continue prozac but increase to two capsules  Send me a message update in 4 weeks  See in 3 months    Thank you for coming in today, if any questions or concerns arise, please call my office.   KRISTINE Collins-CNP

## 2025-05-30 ENCOUNTER — PHARMACY VISIT (OUTPATIENT)
Dept: PHARMACY | Facility: CLINIC | Age: 55
End: 2025-05-30
Payer: COMMERCIAL

## 2025-06-04 PROCEDURE — RXMED WILLOW AMBULATORY MEDICATION CHARGE

## 2025-06-05 ENCOUNTER — HOSPITAL ENCOUNTER (OUTPATIENT)
Dept: RADIOLOGY | Facility: HOSPITAL | Age: 55
Discharge: HOME | End: 2025-06-05
Payer: COMMERCIAL

## 2025-06-05 ENCOUNTER — APPOINTMENT (OUTPATIENT)
Dept: RADIOLOGY | Facility: HOSPITAL | Age: 55
End: 2025-06-05
Payer: COMMERCIAL

## 2025-06-05 ENCOUNTER — PHARMACY VISIT (OUTPATIENT)
Dept: PHARMACY | Facility: CLINIC | Age: 55
End: 2025-06-05
Payer: COMMERCIAL

## 2025-06-05 DIAGNOSIS — N63.42 SUBAREOLAR MASS OF LEFT BREAST: ICD-10-CM

## 2025-06-05 PROCEDURE — 76642 ULTRASOUND BREAST LIMITED: CPT | Mod: LT

## 2025-06-05 PROCEDURE — 76982 USE 1ST TARGET LESION: CPT | Mod: LT

## 2025-06-05 PROCEDURE — 77062 BREAST TOMOSYNTHESIS BI: CPT

## 2025-06-06 ENCOUNTER — TELEPHONE (OUTPATIENT)
Dept: PRIMARY CARE | Facility: CLINIC | Age: 55
End: 2025-06-06
Payer: COMMERCIAL

## 2025-06-06 DIAGNOSIS — N63.42 SUBAREOLAR MASS OF LEFT BREAST: Primary | ICD-10-CM

## 2025-06-06 NOTE — TELEPHONE ENCOUNTER
----- Message from Socrates Castañeda sent at 6/6/2025 11:05 AM EDT -----  Results were abnormal... STOP the testosterone IMMEDIATELY, and see Dr. Vera for follow up on abnormal chest exam on mammogram.  ----- Message -----  From: Interface, Radiology Results In  Sent: 6/5/2025   4:38 PM EDT  To: Socrates Castañeda, KRISTINE-CNP

## 2025-06-06 NOTE — RESULT ENCOUNTER NOTE
Results were abnormal... STOP the testosterone IMMEDIATELY, and see Dr. Vera for follow up on abnormal chest exam on mammogram.

## 2025-06-11 ENCOUNTER — APPOINTMENT (OUTPATIENT)
Dept: SURGERY | Facility: CLINIC | Age: 55
End: 2025-06-11
Payer: COMMERCIAL

## 2025-06-11 ENCOUNTER — PHARMACY VISIT (OUTPATIENT)
Dept: PHARMACY | Facility: CLINIC | Age: 55
End: 2025-06-11
Payer: COMMERCIAL

## 2025-06-11 VITALS
HEART RATE: 96 BPM | WEIGHT: 239 LBS | DIASTOLIC BLOOD PRESSURE: 83 MMHG | BODY MASS INDEX: 37.51 KG/M2 | HEIGHT: 67 IN | TEMPERATURE: 98 F | SYSTOLIC BLOOD PRESSURE: 125 MMHG

## 2025-06-11 DIAGNOSIS — N61.1 BREAST ABSCESS IN MALE: Primary | ICD-10-CM

## 2025-06-11 DIAGNOSIS — N63.42 SUBAREOLAR MASS OF LEFT BREAST: ICD-10-CM

## 2025-06-11 PROCEDURE — 88305 TISSUE EXAM BY PATHOLOGIST: CPT

## 2025-06-11 PROCEDURE — 88112 CYTOPATH CELL ENHANCE TECH: CPT | Performed by: PATHOLOGY

## 2025-06-11 PROCEDURE — 88305 TISSUE EXAM BY PATHOLOGIST: CPT | Performed by: PATHOLOGY

## 2025-06-11 PROCEDURE — 88112 CYTOPATH CELL ENHANCE TECH: CPT

## 2025-06-11 PROCEDURE — RXMED WILLOW AMBULATORY MEDICATION CHARGE

## 2025-06-11 RX ORDER — AMOXICILLIN AND CLAVULANATE POTASSIUM 875; 125 MG/1; MG/1
1 TABLET, FILM COATED ORAL 2 TIMES DAILY
Qty: 28 TABLET | Refills: 0 | Status: SHIPPED | OUTPATIENT
Start: 2025-06-11 | End: 2025-06-25

## 2025-06-11 NOTE — PROGRESS NOTES
Subjective   Patient ID: Domo Arcos is a 54 y.o. male who presents for a breast mass and breast pain    History of Present Illness  The patient is a 54-year-old male who presents for evaluation of left breast pain.    He began experiencing pain in his left breast approximately one year ago, which has been particularly severe over the past two days. A slight retraction of the nipple resembling a dent was noted, but no specific trauma or injury to the area is recalled. Additionally, some discharge from the area adjacent to the nipple has been observed, along with a slight asymmetry in breast size. There is no known family history of breast cancer, and he was adopted, so information about his biological father's medical history is unavailable. Over the past year, several changes in his medication regimen have occurred. He reports no alterations in his testicles, such as pain or swelling.  He is also being seen by dermatology for what appears to be hidradenitis.    FAMILY HISTORY  - Negative for family history of breast cancer.  - Adopted, unknown biological father's medical history.    Medical History[1]     Medications Ordered Prior to Encounter[2]     Review of Systems   All other systems reviewed and are negative.      Vitals:    06/11/25 1343   BP: 125/83   Pulse: 96   Temp: 36.7 °C (98 °F)        Objective     Physical Exam  Vitals reviewed.   Constitutional:       Appearance: Normal appearance.   HENT:      Head: Normocephalic.   Cardiovascular:      Rate and Rhythm: Normal rate and regular rhythm.      Heart sounds: Normal heart sounds.   Pulmonary:      Effort: Pulmonary effort is normal.      Breath sounds: Wheezing present.   Chest:   Breasts:     Right: Normal.      Left: Swelling and skin change present.          Comments: Central erythema left breast with swelling.  Interrogated with ultrasound confirms a likely abscess cavity.  Aspirated under direct ultrasound guidance.  Abdominal:      General:  Abdomen is flat. There is no distension.      Palpations: Abdomen is soft. There is no mass.      Tenderness: There is no abdominal tenderness. There is no guarding.   Genitourinary:     Testes: Normal.   Musculoskeletal:         General: Normal range of motion.   Skin:     General: Skin is warm.      Comments: Multiple lesions left lateral chest wall and both axilla.  Consistent with likely hidradenitis.   Neurological:      General: No focal deficit present.   Psychiatric:         Mood and Affect: Mood normal.      Patient ID: Domo Arcos is a 54 y.o. male.    General    Date/Time: 6/11/2025 2:02 PM    Performed by: Israel Vera MD  Authorized by: Israel Vera MD    Consent:     Consent obtained:  Verbal    Risks discussed:  Bleeding  Universal protocol:     Procedure explained and questions answered to patient or proxy's satisfaction: yes    Procedure specific details:      Under direct ultrasound guidance left breast was interrogated.  The collection was identified which was aspirated under direct ultrasound guidance to review purulent appearing white material.  This was sent to pathology and microbiology      Review of mammogram and ultrasound performed 6/5/2025  IMPRESSION:  1. Palpable 2.5 cm complex cystic and solid mass in the left breast  subareolar region extending into the nipple is suspicious.  Differential includes and not limited to abscess, malignancy or  epidermal inclusion cyst. Recommend ultrasound-guided aspiration  (with fluid sent for culture or cytology if warranted) and core  needle biopsy. Breast surgery consultation is also advised.      2. No left axillary lymphadenopathy.    Problem List Items Addressed This Visit       Subareolar mass of left breast - Primary    Breast abscess in male        Assessment & Plan  Possible left breast abscess.  Plan trial of oral antibiotics.  Follow-up in 2 weeks   May require surgical drainage.    Israel Vera MD   This medical note was created  with the assistance of artificial intelligence (AI) for documentation purposes. The content has been reviewed and confirmed by the healthcare provider for accuracy and completeness. Patient consented to the use of audio recording and use of AI during their visit.          [1]   Past Medical History:  Diagnosis Date    Anemia     Arthritis     Depression     Hypertension     Personal history of other diseases of the musculoskeletal system and connective tissue     History of backache    Radiculopathy, cervical region 02/14/2022    Cervical radiculopathy, acute    Rash and other nonspecific skin eruption 12/07/2022    Rash   [2]   Current Outpatient Medications on File Prior to Visit   Medication Sig Dispense Refill    aspirin 81 mg EC tablet Take 1 tablet (81 mg) by mouth once daily. (Patient not taking: Reported on 6/11/2025)      atorvastatin (Lipitor) 40 mg tablet Take 1 tablet (40 mg) by mouth once daily. 90 tablet 1    cholecalciferol (Vitamin D-3) 50 mcg (2,000 unit) capsule Take 1 capsule (50 mcg) by mouth early in the morning..      cyanocobalamin (Vitamin B-12) 1,000 mcg/mL injection Inject 1 mL (1,000 mcg) into the shoulder, thigh, or buttocks every 30 (thirty) days. 3 mL 0    ferrous sulfate (FeroSuL) 325 mg (65 mg elemental) tablet Take 1 tablet (325 mg) by mouth once daily. 90 tablet 1    FLUoxetine (PROzac) 20 mg capsule Take 2 capsules (40 mg) by mouth once daily. 60 capsule 0    folic acid (Folvite) 1 mg tablet Take 1 tablet (1 mg) by mouth once daily. 30 tablet 11    leflunomide (Arava) 20 mg tablet Take 1 tablet (20 mg) by mouth once daily. 90 tablet 3    lisinopril 10 mg tablet Take 1 tablet (10 mg) by mouth 2 times a day. 360 tablet 0    metoprolol tartrate (Lopressor) 50 mg tablet Take 1 tablet by mouth 2 times a day. 180 tablet 1    minocycline (Dynacin) 100 mg tablet Take  1 tablet twice daily. Take with a full glass of water and stay upright 60 minutes after taking. Avoid taking with dairy  "products. (Patient not taking: Reported on 6/11/2025) 60 tablet 2    sildenafil (Viagra) 50 mg tablet Take 1 tablet (50 mg) by mouth once daily as needed for erectile dysfunction. 12 tablet 3    syringe with needle (BD Luer-Val Syringe) 3 mL 23 x 1\" syringe use as directed . once a month 1 each 0    syringe with needle (Syringe 3cc/22Gx1\") 3 mL 22 gauge x 1\" syringe 1 each every 28 (twenty-eight) days. 3 each 0    testosterone cypionate (Depo-Testosterone) 200 mg/mL injection Inject 0.5 mL (100 mg) into the muscle every 28 (twenty-eight) days. Discard vial after use( 1 injection only) 1 mL 0    tofacitinib ER (Xeljanz XR) 11 mg tablet extended release 24 hr Take 1 tablet (11 mg) by mouth once daily. 30 tablet 5    [DISCONTINUED] escitalopram (Lexapro) 20 mg tablet Take 1.5 tablets (30 mg) by mouth once daily. 135 tablet 1     No current facility-administered medications on file prior to visit.     "

## 2025-06-11 NOTE — PATIENT INSTRUCTIONS
You appear to have a left breast subareolar abscess.  We aspirated some of this today.  It was sent for pathology and culture.  I want you to take the oral antibiotics for 2 weeks.  I will see you back after this.

## 2025-06-15 LAB
BACTERIA SPEC AEROBE CULT: NORMAL
BACTERIA SPEC ANAEROBE CULT: NORMAL

## 2025-06-17 LAB
BACTERIA SPEC AEROBE CULT: NORMAL
BACTERIA SPEC ANAEROBE CULT: NORMAL

## 2025-06-18 ENCOUNTER — TELEPHONE (OUTPATIENT)
Dept: SURGERY | Facility: CLINIC | Age: 55
End: 2025-06-18
Payer: COMMERCIAL

## 2025-06-18 LAB
LABORATORY COMMENT REPORT: NORMAL
LABORATORY COMMENT REPORT: NORMAL
PATH REPORT.FINAL DX SPEC: NORMAL
PATH REPORT.GROSS SPEC: NORMAL
PATH REPORT.RELEVANT HX SPEC: NORMAL
PATH REPORT.TOTAL CANCER: NORMAL
RESIDENT REVIEW: NORMAL

## 2025-06-18 NOTE — TELEPHONE ENCOUNTER
----- Message from Israel Vera sent at 6/18/2025  2:48 PM EDT -----  Let him know that he had a likely abscess on cytology and there were no abnormal cells or evidence of cancer  ----- Message -----  From: Lab, Background User  Sent: 6/18/2025   2:31 PM EDT  To: Israel Vera MD

## 2025-06-24 DIAGNOSIS — M05.9 SEROPOSITIVE RHEUMATOID ARTHRITIS: ICD-10-CM

## 2025-06-24 PROCEDURE — RXMED WILLOW AMBULATORY MEDICATION CHARGE

## 2025-06-24 RX ORDER — TOFACITINIB 11 MG/1
11 TABLET, FILM COATED, EXTENDED RELEASE ORAL DAILY
Qty: 30 TABLET | Refills: 5 | Status: SHIPPED | OUTPATIENT
Start: 2025-06-24 | End: 2025-12-21

## 2025-06-26 ENCOUNTER — PHARMACY VISIT (OUTPATIENT)
Dept: PHARMACY | Facility: CLINIC | Age: 55
End: 2025-06-26
Payer: COMMERCIAL

## 2025-06-26 ENCOUNTER — SPECIALTY PHARMACY (OUTPATIENT)
Dept: PHARMACY | Facility: CLINIC | Age: 55
End: 2025-06-26

## 2025-06-27 ENCOUNTER — APPOINTMENT (OUTPATIENT)
Dept: SURGERY | Facility: CLINIC | Age: 55
End: 2025-06-27
Payer: COMMERCIAL

## 2025-06-27 VITALS
DIASTOLIC BLOOD PRESSURE: 79 MMHG | HEIGHT: 67 IN | BODY MASS INDEX: 38.14 KG/M2 | SYSTOLIC BLOOD PRESSURE: 148 MMHG | HEART RATE: 86 BPM | WEIGHT: 243 LBS

## 2025-06-27 DIAGNOSIS — N61.1 BREAST ABSCESS IN MALE: Primary | ICD-10-CM

## 2025-06-27 DIAGNOSIS — N61.1 LEFT BREAST ABSCESS: ICD-10-CM

## 2025-06-27 PROCEDURE — 3008F BODY MASS INDEX DOCD: CPT | Performed by: SURGERY

## 2025-06-27 PROCEDURE — 99214 OFFICE O/P EST MOD 30 MIN: CPT | Performed by: SURGERY

## 2025-06-27 PROCEDURE — 3077F SYST BP >= 140 MM HG: CPT | Performed by: SURGERY

## 2025-06-27 PROCEDURE — 3078F DIAST BP <80 MM HG: CPT | Performed by: SURGERY

## 2025-06-27 RX ORDER — CELECOXIB 50 MG/1
200 CAPSULE ORAL ONCE
OUTPATIENT
Start: 2025-06-27 | End: 2025-06-27

## 2025-06-27 RX ORDER — ACETAMINOPHEN 325 MG/1
975 TABLET ORAL ONCE
OUTPATIENT
Start: 2025-06-27 | End: 2025-06-27

## 2025-06-27 ASSESSMENT — ENCOUNTER SYMPTOMS: WOUND: 1

## 2025-06-27 NOTE — PROGRESS NOTES
Subjective   Patient ID: Domo Arcos is a 54 y.o. male who presents for follow-up left breast abscess.    HPI   Still having some pain.  Has some swelling.  In the region of the left nipple  Medical History[1]     Medications Ordered Prior to Encounter[2]     Review of Systems   Skin:  Positive for wound.       Vitals:    06/27/25 1404   BP: 148/79   Pulse: 86        Objective     Physical Exam  Cardiovascular:      Heart sounds: Normal heart sounds.   Pulmonary:      Breath sounds: Normal breath sounds.   Chest:   Breasts:     Left: Mass, skin change and tenderness present.          Comments: Left breast with fluctuant abscess at approximately 2 o'clock position.  Interrogated with ultrasound and confirmed.  Lymphadenopathy:      Upper Body:      Left upper body: No axillary adenopathy.         Cytology Consultation (Non-Gynecologic): [id number]  Order: [id number]   Collected 6/11/2025 14:33       Status: Final result       Dx: Breast abscess in male; Subareolar ma...    Test Result Released: Yes (seen)    1 Result Note       View Follow-Up Encounter       Component  Resulting Agency   Final Cytological Interpretation      A. BREAST FINE NEEDLE ASPIRATION RIGHT, CYTOLOGY AND CELL BLOCK:                                                   Negative for intraepithelial lesion or malignancy                Significant acute inflammation admixed with squamous cells, consistent with abscess with features suggestive of subareolar abscess.        No organisms on culture.    Problem List Items Addressed This Visit       Breast abscess in male - Primary    Left breast abscess        Assessment/Plan   Plan-discussed with the patient.  Will need incision and drainage of left breast abscess.  Incision and drainage left breast abscess July 8, 2025  Risks include, but not limited to pain, infection, bleeding, cardiac, pulmonary, neurologic, locomotor, anesthetic events and other unforeseen complications, including death and  "recurrence.        Israel Vera MD        [1]   Past Medical History:  Diagnosis Date    Anemia     Arthritis     Depression     Hypertension     Personal history of other diseases of the musculoskeletal system and connective tissue     History of backache    Radiculopathy, cervical region 02/14/2022    Cervical radiculopathy, acute    Rash and other nonspecific skin eruption 12/07/2022    Rash   [2]   Current Outpatient Medications on File Prior to Visit   Medication Sig Dispense Refill    atorvastatin (Lipitor) 40 mg tablet Take 1 tablet (40 mg) by mouth once daily. 90 tablet 1    cholecalciferol (Vitamin D-3) 50 mcg (2,000 unit) capsule Take 1 capsule (50 mcg) by mouth early in the morning..      cyanocobalamin (Vitamin B-12) 1,000 mcg/mL injection Inject 1 mL (1,000 mcg) into the shoulder, thigh, or buttocks every 30 (thirty) days. 3 mL 0    ferrous sulfate (FeroSuL) 325 mg (65 mg elemental) tablet Take 1 tablet (325 mg) by mouth once daily. 90 tablet 1    FLUoxetine (PROzac) 20 mg capsule Take 2 capsules (40 mg) by mouth once daily. 60 capsule 0    folic acid (Folvite) 1 mg tablet Take 1 tablet (1 mg) by mouth once daily. 30 tablet 11    leflunomide (Arava) 20 mg tablet Take 1 tablet (20 mg) by mouth once daily. 90 tablet 3    lisinopril 10 mg tablet Take 1 tablet (10 mg) by mouth 2 times a day. 360 tablet 0    metoprolol tartrate (Lopressor) 50 mg tablet Take 1 tablet by mouth 2 times a day. 180 tablet 1    minocycline (Dynacin) 100 mg tablet Take  1 tablet twice daily. Take with a full glass of water and stay upright 60 minutes after taking. Avoid taking with dairy products. 60 tablet 2    sildenafil (Viagra) 50 mg tablet Take 1 tablet (50 mg) by mouth once daily as needed for erectile dysfunction. 12 tablet 3    syringe with needle (BD Luer-Val Syringe) 3 mL 23 x 1\" syringe use as directed . once a month 1 each 0    syringe with needle (Syringe 3cc/22Gx1\") 3 mL 22 gauge x 1\" syringe 1 each every 28 " (twenty-eight) days. 3 each 0    testosterone cypionate (Depo-Testosterone) 200 mg/mL injection Inject 0.5 mL (100 mg) into the muscle every 28 (twenty-eight) days. Discard vial after use( 1 injection only) 1 mL 0    tofacitinib ER (Xeljanz XR) 11 mg tablet extended release 24 hr Take 1 tablet (11 mg) by mouth once daily. 30 tablet 5    [] amoxicillin-clavulanate (Augmentin) 875-125 mg tablet Take 1 tablet by mouth 2 times a day for 14 days. Take with food (Patient not taking: Reported on 2025) 28 tablet 0    aspirin 81 mg EC tablet Take 1 tablet (81 mg) by mouth once daily. (Patient not taking: Reported on 2025)      [DISCONTINUED] escitalopram (Lexapro) 20 mg tablet Take 1.5 tablets (30 mg) by mouth once daily. 135 tablet 1    [DISCONTINUED] tofacitinib ER (Xeljanz XR) 11 mg tablet extended release 24 hr Take 1 tablet (11 mg) by mouth once daily. 30 tablet 5     No current facility-administered medications on file prior to visit.

## 2025-06-27 NOTE — H&P (VIEW-ONLY)
Subjective   Patient ID: Domo Arcos is a 54 y.o. male who presents for follow-up left breast abscess.    HPI   Still having some pain.  Has some swelling.  In the region of the left nipple  Medical History[1]     Medications Ordered Prior to Encounter[2]     Review of Systems   Skin:  Positive for wound.       Vitals:    06/27/25 1404   BP: 148/79   Pulse: 86        Objective     Physical Exam  Cardiovascular:      Heart sounds: Normal heart sounds.   Pulmonary:      Breath sounds: Normal breath sounds.   Chest:   Breasts:     Left: Mass, skin change and tenderness present.          Comments: Left breast with fluctuant abscess at approximately 2 o'clock position.  Interrogated with ultrasound and confirmed.  Lymphadenopathy:      Upper Body:      Left upper body: No axillary adenopathy.         Cytology Consultation (Non-Gynecologic): [id number]  Order: [id number]   Collected 6/11/2025 14:33       Status: Final result       Dx: Breast abscess in male; Subareolar ma...    Test Result Released: Yes (seen)    1 Result Note       View Follow-Up Encounter       Component  Resulting Agency   Final Cytological Interpretation      A. BREAST FINE NEEDLE ASPIRATION RIGHT, CYTOLOGY AND CELL BLOCK:                                                   Negative for intraepithelial lesion or malignancy                Significant acute inflammation admixed with squamous cells, consistent with abscess with features suggestive of subareolar abscess.        No organisms on culture.    Problem List Items Addressed This Visit       Breast abscess in male - Primary    Left breast abscess        Assessment/Plan   Plan-discussed with the patient.  Will need incision and drainage of left breast abscess.  Incision and drainage left breast abscess July 8, 2025  Risks include, but not limited to pain, infection, bleeding, cardiac, pulmonary, neurologic, locomotor, anesthetic events and other unforeseen complications, including death and  "recurrence.        Israel Vera MD        [1]   Past Medical History:  Diagnosis Date    Anemia     Arthritis     Depression     Hypertension     Personal history of other diseases of the musculoskeletal system and connective tissue     History of backache    Radiculopathy, cervical region 02/14/2022    Cervical radiculopathy, acute    Rash and other nonspecific skin eruption 12/07/2022    Rash   [2]   Current Outpatient Medications on File Prior to Visit   Medication Sig Dispense Refill    atorvastatin (Lipitor) 40 mg tablet Take 1 tablet (40 mg) by mouth once daily. 90 tablet 1    cholecalciferol (Vitamin D-3) 50 mcg (2,000 unit) capsule Take 1 capsule (50 mcg) by mouth early in the morning..      cyanocobalamin (Vitamin B-12) 1,000 mcg/mL injection Inject 1 mL (1,000 mcg) into the shoulder, thigh, or buttocks every 30 (thirty) days. 3 mL 0    ferrous sulfate (FeroSuL) 325 mg (65 mg elemental) tablet Take 1 tablet (325 mg) by mouth once daily. 90 tablet 1    FLUoxetine (PROzac) 20 mg capsule Take 2 capsules (40 mg) by mouth once daily. 60 capsule 0    folic acid (Folvite) 1 mg tablet Take 1 tablet (1 mg) by mouth once daily. 30 tablet 11    leflunomide (Arava) 20 mg tablet Take 1 tablet (20 mg) by mouth once daily. 90 tablet 3    lisinopril 10 mg tablet Take 1 tablet (10 mg) by mouth 2 times a day. 360 tablet 0    metoprolol tartrate (Lopressor) 50 mg tablet Take 1 tablet by mouth 2 times a day. 180 tablet 1    minocycline (Dynacin) 100 mg tablet Take  1 tablet twice daily. Take with a full glass of water and stay upright 60 minutes after taking. Avoid taking with dairy products. 60 tablet 2    sildenafil (Viagra) 50 mg tablet Take 1 tablet (50 mg) by mouth once daily as needed for erectile dysfunction. 12 tablet 3    syringe with needle (BD Luer-Val Syringe) 3 mL 23 x 1\" syringe use as directed . once a month 1 each 0    syringe with needle (Syringe 3cc/22Gx1\") 3 mL 22 gauge x 1\" syringe 1 each every 28 " (twenty-eight) days. 3 each 0    testosterone cypionate (Depo-Testosterone) 200 mg/mL injection Inject 0.5 mL (100 mg) into the muscle every 28 (twenty-eight) days. Discard vial after use( 1 injection only) 1 mL 0    tofacitinib ER (Xeljanz XR) 11 mg tablet extended release 24 hr Take 1 tablet (11 mg) by mouth once daily. 30 tablet 5    [] amoxicillin-clavulanate (Augmentin) 875-125 mg tablet Take 1 tablet by mouth 2 times a day for 14 days. Take with food (Patient not taking: Reported on 2025) 28 tablet 0    aspirin 81 mg EC tablet Take 1 tablet (81 mg) by mouth once daily. (Patient not taking: Reported on 2025)      [DISCONTINUED] escitalopram (Lexapro) 20 mg tablet Take 1.5 tablets (30 mg) by mouth once daily. 135 tablet 1    [DISCONTINUED] tofacitinib ER (Xeljanz XR) 11 mg tablet extended release 24 hr Take 1 tablet (11 mg) by mouth once daily. 30 tablet 5     No current facility-administered medications on file prior to visit.

## 2025-06-27 NOTE — PATIENT INSTRUCTIONS
We will schedule you for surgery in your left breast on July 8, 2025.  My office will provide you with preprocedure instructions.

## 2025-06-30 ENCOUNTER — ANESTHESIA EVENT (OUTPATIENT)
Dept: OPERATING ROOM | Facility: HOSPITAL | Age: 55
End: 2025-06-30
Payer: COMMERCIAL

## 2025-06-30 ENCOUNTER — APPOINTMENT (OUTPATIENT)
Dept: RHEUMATOLOGY | Facility: CLINIC | Age: 55
End: 2025-06-30
Payer: COMMERCIAL

## 2025-06-30 VITALS
DIASTOLIC BLOOD PRESSURE: 85 MMHG | SYSTOLIC BLOOD PRESSURE: 127 MMHG | OXYGEN SATURATION: 97 % | WEIGHT: 240.2 LBS | HEART RATE: 76 BPM | HEIGHT: 67 IN | BODY MASS INDEX: 37.7 KG/M2

## 2025-06-30 DIAGNOSIS — N63.42 SUBAREOLAR MASS OF LEFT BREAST: ICD-10-CM

## 2025-06-30 DIAGNOSIS — M05.9 SEROPOSITIVE RHEUMATOID ARTHRITIS: ICD-10-CM

## 2025-06-30 DIAGNOSIS — Z79.899 ENCOUNTER FOR LONG-TERM (CURRENT) USE OF MEDICATIONS: Primary | ICD-10-CM

## 2025-06-30 PROCEDURE — 3074F SYST BP LT 130 MM HG: CPT | Performed by: INTERNAL MEDICINE

## 2025-06-30 PROCEDURE — RXMED WILLOW AMBULATORY MEDICATION CHARGE

## 2025-06-30 PROCEDURE — 3079F DIAST BP 80-89 MM HG: CPT | Performed by: INTERNAL MEDICINE

## 2025-06-30 PROCEDURE — 99214 OFFICE O/P EST MOD 30 MIN: CPT | Performed by: INTERNAL MEDICINE

## 2025-06-30 PROCEDURE — 3008F BODY MASS INDEX DOCD: CPT | Performed by: INTERNAL MEDICINE

## 2025-06-30 RX ORDER — LEFLUNOMIDE 10 MG/1
10 TABLET ORAL DAILY
Qty: 90 TABLET | Refills: 3 | Status: SHIPPED | OUTPATIENT
Start: 2025-06-30 | End: 2026-06-30

## 2025-06-30 ASSESSMENT — ENCOUNTER SYMPTOMS
SLEEP DISTURBANCE: 0
FATIGUE: 1
ANAL BLEEDING: 0

## 2025-06-30 ASSESSMENT — PAIN SCALES - GENERAL: PAINLEVEL_OUTOF10: 2

## 2025-06-30 NOTE — PROGRESS NOTES
Subjective   Patient ID: Domo Acros is a 54 y.o. male who presents for Follow-up (6 month).  HPI  Patient with history of seropositive rheumatoid arthritis.  Previously had been on Humira and methotrexate.  Had been found to have a cyst on his spine followed by neurosurgeon Dr. Rico.  Previously had been on allopurinol for elevated uric acid.    Patient was last seen in November and at that time he was still recovering from Septic arthritis of the left foot.    He still has swelling in the foot.  What he is having issues with is his history of hidradenitis suppurativa.  He does have an abscess currently by his left breast.  He had mammogram.  There was also drainage but it did come back.  Will be going to surgery to have it taken out.  Review of Systems   Constitutional:  Positive for fatigue.   HENT:          No dental issues   Eyes:         Wears glasses, vision getting worse.     Respiratory:          +tob use   Cardiovascular:  Negative for chest pain.   Gastrointestinal:  Negative for anal bleeding.   Musculoskeletal:         Per HPI   Skin:         Cysts on back   Psychiatric/Behavioral:  Negative for sleep disturbance.        Objective   Physical Exam  GEN: NAD A&O x3 appropriate affect  EYES: no conjunctival redness, eyelids normal  LYMPH: no cervical lymphadenopathy  NEURO: 5/5 strength upper and lower extremities, DTR +2 bicep and patellar tendons  SKIN:no rashes  PULSES: +radials  TENDER POINTS: 0/18   MSK:  Chronic changes in the DIP PIP MCP and wrists but no swelling or tenderness of these joints.  Contracture of the right elbow mild decreased range of motion bilateral shoulders.  Has swelling more on the right knee than the left and hypertrophic changes medially. Assessment/Plan        Seropositive rheumatoid arthritis. -Previously had been on Humira and methotrexate   -Currently on Xeljanz and leflunomide.  His joints have been stable but he has been having problems with his hidradenitis suppurativa  and getting infection and currently having surgery on his left breast.  Will cut down on his leflunomide to 10 mg.  Will get blood work today.  Will have him come back again in 6 months

## 2025-06-30 NOTE — ANESTHESIA PREPROCEDURE EVALUATION
Patient: Domo Arcos    Procedure Information       Date/Time: 07/08/25 1400    Procedure: INCISION AND DRAINAGE, ABSCESS, BREAST (Left)    Location: [facility] / [facility]    Surgeons: Israel Vera MD            Relevant Problems   Anesthesia (within normal limits)      Cardiac   (+) Atypical chest pain   (+) Hypertension      Pulmonary (within normal limits)      Neuro   (+) Anxiety   (+) Cervical radiculopathy, acute   (+) Depression   (+) Radiculopathy      GI (within normal limits)      /Renal (within normal limits)      Liver (within normal limits)      Endocrine   (+) Obesity      Hematology   (+) Anemia   (+) Iron deficiency anemia   (+) Pernicious anemia      Musculoskeletal   (+) Rheumatoid arthritis      HEENT   (+) Acute maxillary sinusitis   (+) Hearing loss of left ear due to cerumen impaction   (+) Mixed hearing loss, bilateral   (+) Sinus drainage      ID   (+) Septic arthritis (Multi)   (+) Septic arthritis of left foot, due to unspecified organism (Multi)   (+) Surgical site infection      Skin   (+) Rash      GYN (within normal limits)     There were no vitals filed for this visit.    Surgical History[1]  Medical History[2]  Current Medications[3]  Prior to Admission medications    Medication Sig Start Date End Date Taking? Authorizing Provider   amoxicillin-clavulanate (Augmentin) 875-125 mg tablet Take 1 tablet by mouth 2 times a day for 14 days. Take with food  Patient not taking: Reported on 6/27/2025 6/11/25 6/25/25  Israel Vera MD   aspirin 81 mg EC tablet Take 1 tablet (81 mg) by mouth once daily.  Patient not taking: Reported on 6/27/2025 9/11/24   KRISTINE Logan-CNP   atorvastatin (Lipitor) 40 mg tablet Take 1 tablet (40 mg) by mouth once daily. 2/10/25 8/12/25  KRISTINE Collins-CNP   cholecalciferol (Vitamin D-3) 50 mcg (2,000 unit) capsule Take 1 capsule (50 mcg) by mouth early in the morning.. 3/15/21   Historical Provider, MD   cyanocobalamin (Vitamin  "B-12) 1,000 mcg/mL injection Inject 1 mL (1,000 mcg) into the shoulder, thigh, or buttocks every 30 (thirty) days. 10/3/23   REZA Collins   ferrous sulfate (FeroSuL) 325 mg (65 mg elemental) tablet Take 1 tablet (325 mg) by mouth once daily. 2/10/25   REZA Collins   FLUoxetine (PROzac) 20 mg capsule Take 2 capsules (40 mg) by mouth once daily. 5/27/25 7/5/25  REZA Collins   folic acid (Folvite) 1 mg tablet Take 1 tablet (1 mg) by mouth once daily. 9/11/24   Marissa Adams MD   leflunomide (Arava) 20 mg tablet Take 1 tablet (20 mg) by mouth once daily. 11/18/24 11/18/25  Marissa Adams MD   lisinopril 10 mg tablet Take 1 tablet (10 mg) by mouth 2 times a day. 2/10/25   REZA Collins   metoprolol tartrate (Lopressor) 50 mg tablet Take 1 tablet by mouth 2 times a day. 2/10/25   REZA Collins   minocycline (Dynacin) 100 mg tablet Take  1 tablet twice daily. Take with a full glass of water and stay upright 60 minutes after taking. Avoid taking with dairy products. 7/30/24   REZA Gregory   sildenafil (Viagra) 50 mg tablet Take 1 tablet (50 mg) by mouth once daily as needed for erectile dysfunction. 2/10/25 2/10/26  REZA Collins   syringe with needle (BD Luer-Val Syringe) 3 mL 23 x 1\" syringe use as directed . once a month 2/9/23   Fam Patel MD   syringe with needle (Syringe 3cc/22Gx1\") 3 mL 22 gauge x 1\" syringe 1 each every 28 (twenty-eight) days. 5/2/25   REZA Collins   testosterone cypionate (Depo-Testosterone) 200 mg/mL injection Inject 0.5 mL (100 mg) into the muscle every 28 (twenty-eight) days. Discard vial after use( 1 injection only) 4/23/25 1/28/26  KRISTINE Collins-CNP   tofacitinib ER (Xeljanz XR) 11 mg tablet extended release 24 hr Take 1 tablet (11 mg) by mouth once daily. 6/24/25 12/21/25  Marissa Adams MD   escitalopram (Lexapro) 20 mg tablet Take 1.5 tablets (30 mg) " by mouth once daily. 2/10/25 4/23/25  Socrates Castañeda, APRN-CNP   tofacitinib ER (Xeljanz XR) 11 mg tablet extended release 24 hr Take 1 tablet (11 mg) by mouth once daily. 24  Marissa Adams MD     RX Allergies[4]  Social History     Tobacco Use    Smoking status: Every Day     Current packs/day: 0.25     Average packs/day: 0.3 packs/day for 4.0 years (1.0 ttl pk-yrs)     Types: Cigarettes    Smokeless tobacco: Never   Substance Use Topics    Alcohol use: Yes     Comment: socially         Chemistry    Lab Results   Component Value Date/Time     2025 0904    K 4.6 2025 0904     2025 0904    CO2 28 2025 0904    BUN 15 2025 0904    CREATININE 0.78 2025 0904    Lab Results   Component Value Date/Time    CALCIUM 8.7 2025 0904    ALKPHOS 84 2025 0904    AST 27 2025 0904    ALT 22 2025 0904    BILITOT 0.5 2025 0904          Lab Results   Component Value Date/Time    WBC 6.9 2025 0904    HGB 13.2 2025 0904    HCT 39.7 2025 0904     2025 0904     Lab Results   Component Value Date/Time    PROTIME 11.3 2023 1607    INR 1.0 2023 1607     No results found. However, due to the size of the patient record, not all encounters were searched. Please check Results Review for a complete set of results.  No results found for this or any previous visit from the past 1095 days.    Clinical information reviewed:                 Chart reviewed.  No clearances ordered.    NPO Detail:  No data recorded     Physical Exam    Airway  Mallampati: III  TM distance: >3 FB  Neck ROM: full  Mouth openin finger widths     Cardiovascular - normal exam  Rhythm: regular     Dental - normal exam     Pulmonary - normal examBreath sounds clear to auscultation     Abdominal - normal examAbdomen: soft  Bowel sounds: normal           Anesthesia Plan    History of general anesthesia?: yes  History of complications of  general anesthesia?: no    ASA 2     general     The patient is a current smoker.  Patient was previously instructed to abstain from smoking on day of procedure.  Patient did not smoke on day of procedure.    intravenous induction   Anesthetic plan and risks discussed with patient.  Use of blood products discussed with patient who consented to blood products.    Plan discussed with CRNA.             [1]   Past Surgical History:  Procedure Laterality Date    APPENDECTOMY  03/01/2017    Appendectomy    FOOT SURGERY Left 09/10/2024    OTHER SURGICAL HISTORY Right 01/13/2019    Elbow surgery    SPINE SURGERY      Cyst removed from spine   [2]   Past Medical History:  Diagnosis Date    Anemia     Arthritis     Depression     Hypertension     Personal history of other diseases of the musculoskeletal system and connective tissue     History of backache    Radiculopathy, cervical region 02/14/2022    Cervical radiculopathy, acute    Rash and other nonspecific skin eruption 12/07/2022    Rash   [3] No current facility-administered medications for this encounter.    Current Outpatient Medications:     aspirin 81 mg EC tablet, Take 1 tablet (81 mg) by mouth once daily. (Patient not taking: Reported on 6/27/2025), Disp: , Rfl:     atorvastatin (Lipitor) 40 mg tablet, Take 1 tablet (40 mg) by mouth once daily., Disp: 90 tablet, Rfl: 1    cholecalciferol (Vitamin D-3) 50 mcg (2,000 unit) capsule, Take 1 capsule (50 mcg) by mouth early in the morning.., Disp: , Rfl:     cyanocobalamin (Vitamin B-12) 1,000 mcg/mL injection, Inject 1 mL (1,000 mcg) into the shoulder, thigh, or buttocks every 30 (thirty) days., Disp: 3 mL, Rfl: 0    ferrous sulfate (FeroSuL) 325 mg (65 mg elemental) tablet, Take 1 tablet (325 mg) by mouth once daily., Disp: 90 tablet, Rfl: 1    FLUoxetine (PROzac) 20 mg capsule, Take 2 capsules (40 mg) by mouth once daily., Disp: 60 capsule, Rfl: 0    folic acid (Folvite) 1 mg tablet, Take 1 tablet (1 mg) by mouth  "once daily., Disp: 30 tablet, Rfl: 11    leflunomide (Arava) 20 mg tablet, Take 1 tablet (20 mg) by mouth once daily., Disp: 90 tablet, Rfl: 3    lisinopril 10 mg tablet, Take 1 tablet (10 mg) by mouth 2 times a day., Disp: 360 tablet, Rfl: 0    metoprolol tartrate (Lopressor) 50 mg tablet, Take 1 tablet by mouth 2 times a day., Disp: 180 tablet, Rfl: 1    minocycline (Dynacin) 100 mg tablet, Take  1 tablet twice daily. Take with a full glass of water and stay upright 60 minutes after taking. Avoid taking with dairy products., Disp: 60 tablet, Rfl: 2    sildenafil (Viagra) 50 mg tablet, Take 1 tablet (50 mg) by mouth once daily as needed for erectile dysfunction., Disp: 12 tablet, Rfl: 3    syringe with needle (BD Luer-Val Syringe) 3 mL 23 x 1\" syringe, use as directed . once a month, Disp: 1 each, Rfl: 0    syringe with needle (Syringe 3cc/22Gx1\") 3 mL 22 gauge x 1\" syringe, 1 each every 28 (twenty-eight) days., Disp: 3 each, Rfl: 0    testosterone cypionate (Depo-Testosterone) 200 mg/mL injection, Inject 0.5 mL (100 mg) into the muscle every 28 (twenty-eight) days. Discard vial after use( 1 injection only), Disp: 1 mL, Rfl: 0    tofacitinib ER (Xeljanz XR) 11 mg tablet extended release 24 hr, Take 1 tablet (11 mg) by mouth once daily., Disp: 30 tablet, Rfl: 5  [4] No Known Allergies    "

## 2025-07-01 ENCOUNTER — PRE-ADMISSION TESTING (OUTPATIENT)
Dept: PREADMISSION TESTING | Facility: HOSPITAL | Age: 55
End: 2025-07-01
Payer: COMMERCIAL

## 2025-07-01 VITALS — HEIGHT: 67 IN | BODY MASS INDEX: 37.72 KG/M2 | WEIGHT: 240.3 LBS

## 2025-07-01 DIAGNOSIS — Z29.9 PROPHYLACTIC MEASURE: Primary | ICD-10-CM

## 2025-07-01 LAB
ALBUMIN SERPL-MCNC: 4.3 G/DL (ref 3.6–5.1)
ALP SERPL-CCNC: 69 U/L (ref 35–144)
ALT SERPL-CCNC: 20 U/L (ref 9–46)
ANION GAP SERPL CALCULATED.4IONS-SCNC: 8 MMOL/L (CALC) (ref 7–17)
AST SERPL-CCNC: 28 U/L (ref 10–35)
BILIRUB SERPL-MCNC: 0.8 MG/DL (ref 0.2–1.2)
BUN SERPL-MCNC: 17 MG/DL (ref 7–25)
CALCIUM SERPL-MCNC: 9.4 MG/DL (ref 8.6–10.3)
CHLORIDE SERPL-SCNC: 103 MMOL/L (ref 98–110)
CO2 SERPL-SCNC: 27 MMOL/L (ref 20–32)
CREAT SERPL-MCNC: 0.9 MG/DL (ref 0.7–1.3)
CRP SERPL-MCNC: 4.9 MG/L
EGFRCR SERPLBLD CKD-EPI 2021: 103 ML/MIN/1.73M2
ERYTHROCYTE [DISTWIDTH] IN BLOOD BY AUTOMATED COUNT: 13.1 % (ref 11–15)
ERYTHROCYTE [SEDIMENTATION RATE] IN BLOOD BY WESTERGREN METHOD: 14 MM/H
GLUCOSE SERPL-MCNC: 92 MG/DL (ref 65–139)
HCT VFR BLD AUTO: 41.9 % (ref 38.5–50)
HGB BLD-MCNC: 13.9 G/DL (ref 13.2–17.1)
MCH RBC QN AUTO: 29.2 PG (ref 27–33)
MCHC RBC AUTO-ENTMCNC: 33.2 G/DL (ref 32–36)
MCV RBC AUTO: 88 FL (ref 80–100)
PLATELET # BLD AUTO: 239 THOUSAND/UL (ref 140–400)
PMV BLD REES-ECKER: 12.9 FL (ref 7.5–12.5)
POTASSIUM SERPL-SCNC: 4.2 MMOL/L (ref 3.5–5.3)
PROT SERPL-MCNC: 7.2 G/DL (ref 6.1–8.1)
RBC # BLD AUTO: 4.76 MILLION/UL (ref 4.2–5.8)
SODIUM SERPL-SCNC: 138 MMOL/L (ref 135–146)
WBC # BLD AUTO: 9 THOUSAND/UL (ref 3.8–10.8)

## 2025-07-01 PROCEDURE — RXMED WILLOW AMBULATORY MEDICATION CHARGE

## 2025-07-01 RX ORDER — CHLORHEXIDINE GLUCONATE 40 MG/ML
SOLUTION TOPICAL
Qty: 473 ML | Refills: 0 | Status: SHIPPED | OUTPATIENT
Start: 2025-07-01 | End: 2025-07-08 | Stop reason: HOSPADM

## 2025-07-01 ASSESSMENT — DUKE ACTIVITY SCORE INDEX (DASI)
CAN YOU WALK INDOORS, SUCH AS AROUND YOUR HOUSE: YES
CAN YOU DO LIGHT WORK AROUND THE HOUSE LIKE DUSTING OR WASHING DISHES: YES
CAN YOU TAKE CARE OF YOURSELF (EAT, DRESS, BATHE, OR USE TOILET): YES
CAN YOU DO YARD WORK LIKE RAKING LEAVES, WEEDING OR PUSHING A MOWER: YES
CAN YOU RUN A SHORT DISTANCE: YES
CAN YOU DO MODERATE WORK AROUND THE HOUSE LIKE VACUUMING, SWEEPING FLOORS OR CARRYING GROCERIES: YES
CAN YOU CLIMB A FLIGHT OF STAIRS OR WALK UP A HILL: YES
CAN YOU DO HEAVY WORK AROUND THE HOUSE LIKE SCRUBBING FLOORS OR LIFTING AND MOVING HEAVY FURNITURE: YES
CAN YOU PARTICIPATE IN STRENOUS SPORTS LIKE SWIMMING, SINGLES TENNIS, FOOTBALL, BASKETBALL, OR SKIING: YES
CAN YOU WALK A BLOCK OR TWO ON LEVEL GROUND: YES
CAN YOU PARTICIPATE IN MODERATE RECREATIONAL ACTIVITIES LIKE GOLF, BOWLING, DANCING, DOUBLES TENNIS OR THROWING A BASEBALL OR FOOTBALL: YES

## 2025-07-01 ASSESSMENT — PAIN - FUNCTIONAL ASSESSMENT: PAIN_FUNCTIONAL_ASSESSMENT: 0-10

## 2025-07-01 ASSESSMENT — PAIN SCALES - GENERAL: PAINLEVEL_OUTOF10: 0 - NO PAIN

## 2025-07-01 NOTE — PREPROCEDURE INSTRUCTIONS
"   Medication List            Accurate as of July 1, 2025  9:28 AM. Always use your most recent med list.                aspirin 81 mg EC tablet  Take 1 tablet (81 mg) by mouth once daily.  Medication Adjustments for Surgery: Do Not take on the morning of surgery     atorvastatin 40 mg tablet  Commonly known as: Lipitor  Take 1 tablet (40 mg) by mouth once daily.  Notes to patient: May take the evening prior to procedure      * BD Luer-Val Syringe 3 mL 23 x 1\" syringe  Generic drug: syringe with needle  use as directed . once a month     * [employer] Luer Lock Syr-needle 3 mL 22 gauge x 1\" syringe  Generic drug: syringe with needle  1 each every 28 (twenty-eight) days.     chlorhexidine 4 % external liquid  Commonly known as: Hibiclens  Apply to entire body focusing on surgical area, avoiding genitals, wash an rinse thoroughly for 5 days leading up to surgery  Medication Adjustments for Surgery: Take/Use as prescribed     cholecalciferol 50 mcg (2,000 units) capsule  Commonly known as: Vitamin D-3  Medication Adjustments for Surgery: Take last dose 3 days before surgery     cyanocobalamin 1,000 mcg/mL injection  Commonly known as: Vitamin B-12  Inject 1 mL (1,000 mcg) into the shoulder, thigh, or buttocks every 30 (thirty) days.  Medication Adjustments for Surgery: Take last dose 3 days before surgery     FeroSuL 325 mg (65 mg iron) tablet  Generic drug: ferrous sulfate  Take 1 tablet (325 mg) by mouth once daily.  Additional Medication Adjustments for Surgery: Take last dose 7 days before surgery     FLUoxetine 20 mg capsule  Commonly known as: PROzac  Take 2 capsules (40 mg) by mouth once daily.  Notes to patient: May take the morning of procedure with sip of water.       folic acid 1 mg tablet  Commonly known as: Folvite  Take 1 tablet (1 mg) by mouth once daily.  Medication Adjustments for Surgery: Take last dose 3 days before surgery     leflunomide 10 mg tablet  Commonly known as: Arava  Take 1 tablet (10 mg) " by mouth once daily.  Medication Adjustments for Surgery: Take on the morning of surgery     lisinopril 10 mg tablet  Take 1 tablet (10 mg) by mouth 2 times a day.  Notes to patient: Hold the morning of procedure.       metoprolol tartrate 50 mg tablet  Commonly known as: Lopressor  Take 1 tablet by mouth 2 times a day.  Medication Adjustments for Surgery: Take on the morning of surgery     minocycline 100 mg tablet  Commonly known as: Dynacin  Take  1 tablet twice daily. Take with a full glass of water and stay upright 60 minutes after taking. Avoid taking with dairy products.  Notes to patient: Patient not taking      sildenafil 50 mg tablet  Commonly known as: Viagra  Take 1 tablet (50 mg) by mouth once daily as needed for erectile dysfunction.  Notes to patient: Do not take 24 hrs prior to surgery      testosterone cypionate 200 mg/mL injection  Commonly known as: Depo-Testosterone  Inject 0.5 mL (100 mg) into the muscle every 28 (twenty-eight) days. Discard vial after use( 1 injection only)  Notes to patient: May take your normal injection this week      Xeljanz XR 11 mg tablet extended release 24 hr  Generic drug: tofacitinib ER  Take 1 tablet (11 mg) by mouth once daily.  Medication Adjustments for Surgery: Take on the morning of surgery           * This list has 2 medication(s) that are the same as other medications prescribed for you. Read the directions carefully, and ask your doctor or other care provider to review them with you.                                  NPO Instructions:    Nothing to eat after midnight on 7/7/25.  Clear liquids- water, black coffee, tea 3 hrs prior to arrival.      Additional Instructions:     Call outpatient surgery on 7/7/25 between 1-3 pm to get your arrival time.   [phone]    No food after midnight including candy, gum or mints.     You can have clear liquids up to 3 hrs prior to your procedure.  NO DAIRY, NO CREAMER, NO NON DAIRY OR ALMOND, OAT, COCONUT ETC.    Please  refrain from smoking THC, cigarettes or vaping prior to your procedure at least 24 hrs.     When  you arrive park in the back ER parking lot.  Come through the ER lobby and take the first elevator to second floor.   Check in on the outpatient surgery window.    Leave all valuables at home and remove all piercing's.      Do bath for infection control Start on 7/3/25     NO driving for 24 hrs if you have had anesthesia or sedation.   You will also need a  if you are receiving sedation.       Bring glasses so you can read what you are signing.

## 2025-07-01 NOTE — CPM/PAT NURSE NOTE
CPM/PAT Nurse Note      Name: Domo Arcos (Domo Arcos)  /Age: 1970/54 y.o.       Medical History[1]    Surgical History[2]    Patient  reports being sexually active.    Family History[3]    Allergies[4]    Prior to Admission medications    Medication Sig Start Date End Date Taking? Authorizing Provider   atorvastatin (Lipitor) 40 mg tablet Take 1 tablet (40 mg) by mouth once daily. 2/10/25 8/12/25 Yes REZA Collins   cholecalciferol (Vitamin D-3) 50 mcg (2,000 unit) capsule Take 1 capsule (50 mcg) by mouth early in the morning.. 3/15/21  Yes Historical Provider, MD   cyanocobalamin (Vitamin B-12) 1,000 mcg/mL injection Inject 1 mL (1,000 mcg) into the shoulder, thigh, or buttocks every 30 (thirty) days. 10/3/23  Yes REZA Collins   ferrous sulfate (FeroSuL) 325 mg (65 mg elemental) tablet Take 1 tablet (325 mg) by mouth once daily. 2/10/25  Yes REZA Collins   FLUoxetine (PROzac) 20 mg capsule Take 2 capsules (40 mg) by mouth once daily. 25 Yes REZA Collins   folic acid (Folvite) 1 mg tablet Take 1 tablet (1 mg) by mouth once daily. 24  Yes Marissa Adams MD   leflunomide (Arava) 10 mg tablet Take 1 tablet (10 mg) by mouth once daily. 25 Yes Marissa Adams MD   lisinopril 10 mg tablet Take 1 tablet (10 mg) by mouth 2 times a day. 2/10/25  Yes REZA Collins   metoprolol tartrate (Lopressor) 50 mg tablet Take 1 tablet by mouth 2 times a day. 2/10/25  Yes REZA Collins   sildenafil (Viagra) 50 mg tablet Take 1 tablet (50 mg) by mouth once daily as needed for erectile dysfunction. 2/10/25 2/10/26 Yes Socrates S Castañeda, APRN-CNP   testosterone cypionate (Depo-Testosterone) 200 mg/mL injection Inject 0.5 mL (100 mg) into the muscle every 28 (twenty-eight) days. Discard vial after use( 1 injection only) 25 Yes KRISTINE Collins-CNP   tofacitinib ER (Xeljanz XR) 11 mg tablet  "extended release 24 hr Take 1 tablet (11 mg) by mouth once daily. 6/24/25 12/21/25 Yes Marissa Adams MD   amoxicillin-clavulanate (Augmentin) 875-125 mg tablet Take 1 tablet by mouth 2 times a day for 14 days. Take with food  Patient not taking: Reported on 6/27/2025 6/11/25 6/25/25  Israel Vera MD   aspirin 81 mg EC tablet Take 1 tablet (81 mg) by mouth once daily.  Patient not taking: Reported on 7/1/2025 9/11/24   REZA Logan   chlorhexidine (Hibiclens) 4 % external liquid Apply to entire body focusing on surgical area, avoiding genitals, wash an rinse thoroughly for 5 days leading up to surgery 7/1/25   Dustin Garcia PA-C   minocycline (Dynacin) 100 mg tablet Take  1 tablet twice daily. Take with a full glass of water and stay upright 60 minutes after taking. Avoid taking with dairy products.  Patient not taking: Reported on 6/30/2025 7/30/24   REZA Gregory   syringe with needle (BD Luer-Val Syringe) 3 mL 23 x 1\" syringe use as directed . once a month 2/9/23   Fam Patel MD   syringe with needle (Syringe 3cc/22Gx1\") 3 mL 22 gauge x 1\" syringe 1 each every 28 (twenty-eight) days. 5/2/25   REZA Collins   escitalopram (Lexapro) 20 mg tablet Take 1.5 tablets (30 mg) by mouth once daily. 2/10/25 4/23/25  KRISTINE Collins-CNP   leflunomide (Arava) 20 mg tablet Take 1 tablet (20 mg) by mouth once daily. 11/18/24 6/30/25  Marissa Adams MD        PAT ROS     DASI Risk Score      Flowsheet Row Pre-Admission Testing from 7/1/2025 in [facility] Pre-Admission Testing from 6/17/2024 in [facility]   Can you take care of yourself (eat, dress, bathe, or use toilet)?  2.75 filed at 07/01/2025 0930 2.75 filed at 06/17/2024 0938   Can you walk indoors, such as around your house? 1.75 filed at 07/01/2025 0930 1.75 filed at 06/17/2024 0938   Can you walk a block or two on level ground?  2.75 filed at 07/01/2025 0930 2.75 filed " at 06/17/2024 0938   Can you climb a flight of stairs or walk up a hill? 5.5 filed at 07/01/2025 0930 5.5 filed at 06/17/2024 0938   Can you run a short distance? 8 filed at 07/01/2025 0930 8 filed at 06/17/2024 0938   Can you do light work around the house like dusting or washing dishes? 2.7 filed at 07/01/2025 0930 2.7 filed at 06/17/2024 0938   Can you do moderate work around the house like vacuuming, sweeping floors or carrying groceries? 3.5 filed at 07/01/2025 0930 3.5 filed at 06/17/2024 0938   Can you do heavy work around the house like scrubbing floors or lifting and moving heavy furniture?  8 filed at 07/01/2025 0930 8 filed at 06/17/2024 0938   Can you do yard work like raking leaves, weeding or pushing a mower? 4.5 filed at 07/01/2025 0930 4.5 filed at 06/17/2024 0938   Can you have sexual relations? -- 5.25 filed at 06/17/2024 0938   Can you participate in moderate recreational activities like golf, bowling, dancing, doubles tennis or throwing a baseball or football? 6 filed at 07/01/2025 0930 6 filed at 06/17/2024 0938   Can you participate in strenous sports like swimming, singles tennis, football, basketball, or skiing? 7.5 filed at 07/01/2025 0930 7.5 filed at 06/17/2024 0938   DASI SCORE -- 58.2 filed at 06/17/2024 0938   METS Score (Will be calculated only when all the questions are answered) -- 9.9 filed at 06/17/2024 0938          Caprini DVT Assessment      Flowsheet Row ED to Hosp-Admission (Discharged) from 9/7/2024 in [facility] 2 with Gabbi Payne MD and Vahe Arce, DO   DVT Score (IF A SCORE IS NOT CALCULATING, MUST SELECT A BMI TO COMPLETE) 4 filed at 09/07/2024 1600   BMI (BMI MUST BE CHOSEN) 31-40 (Obesity) filed at 09/07/2024 1600   RETIRED: Age 40-59 years filed at 09/07/2024 1600          Modified Frailty Index    No data to display       [id number] Stroke Risk Points  Current as of just now        N/A 0 to 9 Points:      Last Change: N/A          The  [id number] risk score (Savanna DOMINGUEZ et al. 2009. © 2010 [employer]) quantifies the risk of stroke for a patient with atrial fibrillation. For patients without atrial fibrillation or under the age of 18 this score appears as N/A. Higher score values generally indicate higher risk of stroke.        This score is not applicable to this patient. Components are not calculated.          Revised Cardiac Risk Index    No data to display       Apfel Simplified Score    No data to display       Risk Analysis Index Results This Encounter    No data found in the last 10 encounters.       Stop Bang Score      Flowsheet Row Pre-Admission Testing from 7/1/2025 in [facility] Colonoscopy from 7/8/2024 in [facility] with Israel Vera MD   Do you snore loudly? 1 filed at 07/01/2025 0930 1 filed at 07/08/2024 0924   Do you often feel tired or fatigued after your sleep? 0 filed at 07/01/2025 0930 1 filed at 07/08/2024 0924   Has anyone ever observed you stop breathing in your sleep? 1 filed at 07/01/2025 0930 1 filed at 07/08/2024 0924   Do you have or are you being treated for high blood pressure? 1 filed at 07/01/2025 0930 1 filed at 07/08/2024 0924   Recent BMI (Calculated) 37.6 filed at 07/01/2025 0930 37.3 filed at 07/08/2024 0924   Is BMI greater than 35 kg/m2? 1=Yes filed at 07/01/2025 0930 1=Yes filed at 07/08/2024 0924   Age older than 50 years old? 1=Yes filed at 07/01/2025 0930 1=Yes filed at 07/08/2024 0924   Is your neck circumference greater than 17 inches (Male) or 16 inches (Female)? 0 filed at 07/01/2025 0930 0 filed at 07/08/2024 0924   Gender - Male 1=Yes filed at 07/01/2025 0930 1=Yes filed at 07/08/2024 0924   STOP-BANG Total Score 6 filed at 07/01/2025 0930 7 filed at 07/08/2024 0924          Prodigy: High Risk  Total Score: 8              Prodigy Gender Score          ARISCAT Score for Postoperative Pulmonary Complications      Flowsheet Row Pre-Admission  Testing from 7/1/2025 in [facility]   Respiratory infection in the last month Either upper or lower (i.e., URI, bronchitis, pneumonia), with fever and antibiotic treatment 0 filed at 07/01/2025 0930   Duration of surgery  0 filed at 07/01/2025 0930   Emergency Procedure  0 filed at 07/01/2025 0930          Guerrero Perioperative Risk for Myocardial Infarction or Cardiac Arrest (MIGUELITO)      Flowsheet Row Pre-Admission Testing from 7/1/2025 in [facility]   Calculated Age Score 1.04 filed at 07/01/2025 0931   Functional Status  0 filed at 07/01/2025 0931            Nurse Plan of Action:     Patient states wife will be his .  Trisha            [1]   Past Medical History:  Diagnosis Date    Anemia     Arthritis     Depression     Hyperlipidemia     Hypertension     Personal history of other diseases of the musculoskeletal system and connective tissue     History of backache    Radiculopathy, cervical region 02/14/2022    Cervical radiculopathy, acute    Rash and other nonspecific skin eruption 12/07/2022    Rash   [2]   Past Surgical History:  Procedure Laterality Date    APPENDECTOMY  03/01/2017    Appendectomy    FOOT SURGERY Left 09/10/2024    OTHER SURGICAL HISTORY Right 01/13/2019    Elbow surgery    SPINE SURGERY      Cyst removed from spine   [3]   Family History  Problem Relation Name Age of Onset    Rashes / Skin problems Brother     [4] No Known Allergies

## 2025-07-02 ENCOUNTER — PHARMACY VISIT (OUTPATIENT)
Dept: PHARMACY | Facility: CLINIC | Age: 55
End: 2025-07-02
Payer: COMMERCIAL

## 2025-07-08 ENCOUNTER — ANESTHESIA (OUTPATIENT)
Dept: OPERATING ROOM | Facility: HOSPITAL | Age: 55
End: 2025-07-08
Payer: COMMERCIAL

## 2025-07-08 ENCOUNTER — HOSPITAL ENCOUNTER (OUTPATIENT)
Facility: HOSPITAL | Age: 55
Setting detail: OUTPATIENT SURGERY
Discharge: HOME | End: 2025-07-08
Attending: SURGERY | Admitting: SURGERY
Payer: COMMERCIAL

## 2025-07-08 VITALS
SYSTOLIC BLOOD PRESSURE: 136 MMHG | HEIGHT: 67 IN | DIASTOLIC BLOOD PRESSURE: 96 MMHG | OXYGEN SATURATION: 97 % | HEART RATE: 70 BPM | WEIGHT: 240 LBS | RESPIRATION RATE: 16 BRPM | TEMPERATURE: 97.7 F | BODY MASS INDEX: 37.67 KG/M2

## 2025-07-08 DIAGNOSIS — N63.42 SUBAREOLAR MASS OF LEFT BREAST: Primary | ICD-10-CM

## 2025-07-08 DIAGNOSIS — I10 PRIMARY HYPERTENSION: ICD-10-CM

## 2025-07-08 DIAGNOSIS — N61.1 LEFT BREAST ABSCESS: ICD-10-CM

## 2025-07-08 DIAGNOSIS — E29.1 HYPOGONADISM IN MALE: ICD-10-CM

## 2025-07-08 DIAGNOSIS — E78.5 DYSLIPIDEMIA: ICD-10-CM

## 2025-07-08 DIAGNOSIS — E29.1 HYPOGONADISM MALE: Primary | ICD-10-CM

## 2025-07-08 DIAGNOSIS — E29.1 HYPOGONADISM MALE: ICD-10-CM

## 2025-07-08 DIAGNOSIS — N61.1 BREAST ABSCESS IN MALE: ICD-10-CM

## 2025-07-08 DIAGNOSIS — F41.9 ANXIETY: ICD-10-CM

## 2025-07-08 PROCEDURE — 10061 I&D ABSCESS COMP/MULTIPLE: CPT | Performed by: SURGERY

## 2025-07-08 PROCEDURE — 7100000010 HC PHASE TWO TIME - EACH INCREMENTAL 1 MINUTE: Performed by: SURGERY

## 2025-07-08 PROCEDURE — 3600000003 HC OR TIME - INITIAL BASE CHARGE - PROCEDURE LEVEL THREE: Performed by: SURGERY

## 2025-07-08 PROCEDURE — 2500000004 HC RX 250 GENERAL PHARMACY W/ HCPCS (ALT 636 FOR OP/ED): Mod: JW | Performed by: SURGERY

## 2025-07-08 PROCEDURE — 3700000002 HC GENERAL ANESTHESIA TIME - EACH INCREMENTAL 1 MINUTE: Performed by: SURGERY

## 2025-07-08 PROCEDURE — 87077 CULTURE AEROBIC IDENTIFY: CPT | Mod: GENLAB | Performed by: SURGERY

## 2025-07-08 PROCEDURE — 2720000007 HC OR 272 NO HCPCS: Performed by: SURGERY

## 2025-07-08 PROCEDURE — 7100000001 HC RECOVERY ROOM TIME - INITIAL BASE CHARGE: Performed by: SURGERY

## 2025-07-08 PROCEDURE — 7100000009 HC PHASE TWO TIME - INITIAL BASE CHARGE: Performed by: SURGERY

## 2025-07-08 PROCEDURE — 88304 TISSUE EXAM BY PATHOLOGIST: CPT | Mod: TC,GENLAB | Performed by: SURGERY

## 2025-07-08 PROCEDURE — 19020 MASTOTOMY EXPL DRG ABSC DP: CPT | Performed by: SURGERY

## 2025-07-08 PROCEDURE — 2500000004 HC RX 250 GENERAL PHARMACY W/ HCPCS (ALT 636 FOR OP/ED): Performed by: SURGERY

## 2025-07-08 PROCEDURE — 2500000004 HC RX 250 GENERAL PHARMACY W/ HCPCS (ALT 636 FOR OP/ED)

## 2025-07-08 PROCEDURE — 3700000001 HC GENERAL ANESTHESIA TIME - INITIAL BASE CHARGE: Performed by: SURGERY

## 2025-07-08 PROCEDURE — 2500000001 HC RX 250 WO HCPCS SELF ADMINISTERED DRUGS (ALT 637 FOR MEDICARE OP): Performed by: SURGERY

## 2025-07-08 PROCEDURE — 7100000002 HC RECOVERY ROOM TIME - EACH INCREMENTAL 1 MINUTE: Performed by: SURGERY

## 2025-07-08 PROCEDURE — 3600000008 HC OR TIME - EACH INCREMENTAL 1 MINUTE - PROCEDURE LEVEL THREE: Performed by: SURGERY

## 2025-07-08 RX ORDER — ACETAMINOPHEN 325 MG/1
650 TABLET ORAL EVERY 4 HOURS PRN
Status: DISCONTINUED | OUTPATIENT
Start: 2025-07-08 | End: 2025-07-08 | Stop reason: HOSPADM

## 2025-07-08 RX ORDER — ONDANSETRON HYDROCHLORIDE 2 MG/ML
INJECTION, SOLUTION INTRAVENOUS AS NEEDED
Status: DISCONTINUED | OUTPATIENT
Start: 2025-07-08 | End: 2025-07-08

## 2025-07-08 RX ORDER — BUPIVACAINE HYDROCHLORIDE 5 MG/ML
INJECTION, SOLUTION EPIDURAL; INTRACAUDAL; PERINEURAL AS NEEDED
Status: DISCONTINUED | OUTPATIENT
Start: 2025-07-08 | End: 2025-07-08 | Stop reason: HOSPADM

## 2025-07-08 RX ORDER — MIDAZOLAM HYDROCHLORIDE 1 MG/ML
INJECTION INTRAMUSCULAR; INTRAVENOUS AS NEEDED
Status: DISCONTINUED | OUTPATIENT
Start: 2025-07-08 | End: 2025-07-08

## 2025-07-08 RX ORDER — FENTANYL CITRATE 50 UG/ML
25 INJECTION, SOLUTION INTRAMUSCULAR; INTRAVENOUS EVERY 5 MIN PRN
Status: DISCONTINUED | OUTPATIENT
Start: 2025-07-08 | End: 2025-07-08 | Stop reason: HOSPADM

## 2025-07-08 RX ORDER — FENTANYL CITRATE 50 UG/ML
INJECTION, SOLUTION INTRAMUSCULAR; INTRAVENOUS AS NEEDED
Status: DISCONTINUED | OUTPATIENT
Start: 2025-07-08 | End: 2025-07-08

## 2025-07-08 RX ORDER — PROPOFOL 10 MG/ML
INJECTION, EMULSION INTRAVENOUS AS NEEDED
Status: DISCONTINUED | OUTPATIENT
Start: 2025-07-08 | End: 2025-07-08

## 2025-07-08 RX ORDER — APREPITANT 40 MG/1
40 CAPSULE ORAL ONCE
Status: DISCONTINUED | OUTPATIENT
Start: 2025-07-08 | End: 2025-07-08 | Stop reason: HOSPADM

## 2025-07-08 RX ORDER — OXYCODONE HYDROCHLORIDE 5 MG/1
5 TABLET ORAL EVERY 4 HOURS PRN
Status: DISCONTINUED | OUTPATIENT
Start: 2025-07-08 | End: 2025-07-08 | Stop reason: HOSPADM

## 2025-07-08 RX ORDER — KETOROLAC TROMETHAMINE 30 MG/ML
INJECTION, SOLUTION INTRAMUSCULAR; INTRAVENOUS AS NEEDED
Status: DISCONTINUED | OUTPATIENT
Start: 2025-07-08 | End: 2025-07-08

## 2025-07-08 RX ORDER — LIDOCAINE HYDROCHLORIDE 10 MG/ML
INJECTION, SOLUTION EPIDURAL; INFILTRATION; INTRACAUDAL; PERINEURAL AS NEEDED
Status: DISCONTINUED | OUTPATIENT
Start: 2025-07-08 | End: 2025-07-08 | Stop reason: HOSPADM

## 2025-07-08 RX ORDER — CELECOXIB 100 MG/1
200 CAPSULE ORAL ONCE
Status: COMPLETED | OUTPATIENT
Start: 2025-07-08 | End: 2025-07-08

## 2025-07-08 RX ORDER — ONDANSETRON HYDROCHLORIDE 2 MG/ML
4 INJECTION, SOLUTION INTRAVENOUS ONCE AS NEEDED
Status: DISCONTINUED | OUTPATIENT
Start: 2025-07-08 | End: 2025-07-08 | Stop reason: HOSPADM

## 2025-07-08 RX ORDER — ACETAMINOPHEN 325 MG/1
975 TABLET ORAL ONCE
Status: COMPLETED | OUTPATIENT
Start: 2025-07-08 | End: 2025-07-08

## 2025-07-08 RX ORDER — IBUPROFEN 600 MG/1
600 TABLET, FILM COATED ORAL EVERY 6 HOURS PRN
Qty: 20 TABLET | Refills: 0 | Status: SHIPPED | OUTPATIENT
Start: 2025-07-08 | End: 2025-07-18

## 2025-07-08 RX ORDER — ACETAMINOPHEN 325 MG/1
650 TABLET ORAL EVERY 6 HOURS PRN
Qty: 20 TABLET | Refills: 0 | Status: SHIPPED | OUTPATIENT
Start: 2025-07-08 | End: 2025-07-18

## 2025-07-08 RX ORDER — LIDOCAINE HYDROCHLORIDE 20 MG/ML
INJECTION, SOLUTION INFILTRATION; PERINEURAL AS NEEDED
Status: DISCONTINUED | OUTPATIENT
Start: 2025-07-08 | End: 2025-07-08

## 2025-07-08 RX ADMIN — FENTANYL CITRATE 25 MCG: 50 INJECTION, SOLUTION INTRAMUSCULAR; INTRAVENOUS at 14:47

## 2025-07-08 RX ADMIN — CELECOXIB 200 MG: 100 CAPSULE ORAL at 12:13

## 2025-07-08 RX ADMIN — FENTANYL CITRATE 50 MCG: 50 INJECTION, SOLUTION INTRAMUSCULAR; INTRAVENOUS at 13:59

## 2025-07-08 RX ADMIN — FENTANYL CITRATE 25 MCG: 50 INJECTION, SOLUTION INTRAMUSCULAR; INTRAVENOUS at 14:54

## 2025-07-08 RX ADMIN — ONDANSETRON 4 MG: 2 INJECTION, SOLUTION INTRAMUSCULAR; INTRAVENOUS at 14:10

## 2025-07-08 RX ADMIN — DEXTROSE MONOHYDRATE 2 G: 5 INJECTION INTRAVENOUS at 13:53

## 2025-07-08 RX ADMIN — PROPOFOL 200 MG: 10 INJECTION, EMULSION INTRAVENOUS at 13:55

## 2025-07-08 RX ADMIN — MIDAZOLAM HYDROCHLORIDE 2 MG: 1 INJECTION, SOLUTION INTRAMUSCULAR; INTRAVENOUS at 13:49

## 2025-07-08 RX ADMIN — DEXAMETHASONE SODIUM PHOSPHATE 4 MG: 4 INJECTION, SOLUTION INTRAMUSCULAR; INTRAVENOUS at 14:01

## 2025-07-08 RX ADMIN — LIDOCAINE HYDROCHLORIDE 100 MG: 20 INJECTION, SOLUTION INFILTRATION; PERINEURAL at 13:55

## 2025-07-08 RX ADMIN — ACETAMINOPHEN 975 MG: 325 TABLET, FILM COATED ORAL at 12:13

## 2025-07-08 RX ADMIN — KETOROLAC TROMETHAMINE 15 MG: 30 INJECTION, SOLUTION INTRAMUSCULAR; INTRAVENOUS at 14:31

## 2025-07-08 RX ADMIN — FENTANYL CITRATE 50 MCG: 50 INJECTION, SOLUTION INTRAMUSCULAR; INTRAVENOUS at 14:10

## 2025-07-08 SDOH — HEALTH STABILITY: MENTAL HEALTH: CURRENT SMOKER: 1

## 2025-07-08 ASSESSMENT — PAIN - FUNCTIONAL ASSESSMENT
PAIN_FUNCTIONAL_ASSESSMENT: 0-10

## 2025-07-08 ASSESSMENT — PAIN SCALES - GENERAL
PAINLEVEL_OUTOF10: 0 - NO PAIN
PAINLEVEL_OUTOF10: 4
PAINLEVEL_OUTOF10: 0 - NO PAIN
PAIN_LEVEL: 0
PAINLEVEL_OUTOF10: 0 - NO PAIN

## 2025-07-08 ASSESSMENT — COLUMBIA-SUICIDE SEVERITY RATING SCALE - C-SSRS
2. HAVE YOU ACTUALLY HAD ANY THOUGHTS OF KILLING YOURSELF?: NO
6. HAVE YOU EVER DONE ANYTHING, STARTED TO DO ANYTHING, OR PREPARED TO DO ANYTHING TO END YOUR LIFE?: NO
1. IN THE PAST MONTH, HAVE YOU WISHED YOU WERE DEAD OR WISHED YOU COULD GO TO SLEEP AND NOT WAKE UP?: NO

## 2025-07-08 NOTE — OP NOTE
INCISION AND DRAINAGE, ABSCESS, BREAST (L) Operative Note     Date: 2025  OR Location: [facility]    Name: Domo Arcos, : 1970, Age: 54 y.o., MRN: [id number], Sex: male    Diagnosis  Pre-op Diagnosis      * Breast abscess in male [N61.1]     * Left breast abscess [N61.1] Post-op Diagnosis     * Breast abscess in male [N61.1]     * Left breast abscess [N61.1]     Procedures  INCISION AND DRAINAGE, ABSCESS, BREAST  07141 - VT MASTOTOMY W/EXPLORATION/DRAINAGE ABSCESS DEEP      Surgeons      * Israel Vera - Primary    Resident/Fellow/Other Assistant:  Surgeons and Role:     * Saul Mccauley PA-C - Assisting    Staff:   Circulator: Meme Hassan Person: Megan    Anesthesia Staff: CRNA: KRISTINE Cotton-CRNA    Procedure Summary  Anesthesia: General  ASA: II  Estimated Blood Loss: 5 mL  Intra-op Medications:   Administrations occurring from 1315 to 1350 on 25:   Medication Name Total Dose   midazolam PF (Versed) injection 1 mg/mL 2 mg              Anesthesia Record               Intraprocedure I/O Totals          Intake    Propofol Drip 0.00 mL    The total shown is the total volume documented since Anesthesia Start was filed.    cefOXitin (Mefoxin) 2 g in dextrose 5% 100 mL .00 mL    Total Intake 100 mL          Specimen:   ID Type Source Tests Collected by Time   1 : LEFT BREAST ABSCESS CAVITY TISSUE Tissue SOFT TISSUE MASS RESECTION SURGICAL PATHOLOGY EXAM Israel Vera MD 2025 1414   A : LEFT BREAST ABSCESS Swab ABSCESS TISSUE/WOUND CULTURE/SMEAR Israel Vera MD 2025 1417   B : LEFT CHEST WALL ABSCESS Swab ABSCESS TISSUE/WOUND CULTURE/SMEAR Israel Vera MD 2025 1430                 Drains and/or Catheters: * None in log *    Tourniquet Times:         Implants:     Findings: Deep left breast abscess entire abscess cavity removed.  4 left lateral chest wall abscesses all incised and drained.  Culture sent from both wounds.    Indications: Domo Arcos is an 54  y.o. male who is having surgery for Breast abscess in male [N61.1]  Left breast abscess [N61.1].  The patient presented with a chronic abscess of the left breast.  This had been aspirated and ultrasound-guided aspiration.  He continued to have pain discomfort in spite of antibiotics.  On the day of the surgery he was noted to have a fluctuant abscess of the left chest wall.  He does have a history of hidradenitis.    The patient was seen in the preoperative area. The risks, benefits, complications, treatment options, non-operative alternatives, expected recovery and outcomes were discussed with the patient. The possibilities of reaction to medication, pulmonary aspiration, injury to surrounding structures, bleeding, recurrent infection, the need for additional procedures, failure to diagnose a condition, and creating a complication requiring transfusion or operation were discussed with the patient. The patient concurred with the proposed plan, giving informed consent.  The site of surgery was properly noted/marked if necessary per policy. The patient has been actively warmed in preoperative area. Preoperative antibiotics have been ordered and given within 1 hours of incision. Venous thrombosis prophylaxis have been ordered including bilateral sequential compression devices    Procedure Details: After obtaining informed consent and discussing all the risks which include but not limited to to pain, infection, bleeding, cardiac, pulmonary, neurologic, locomotor, anesthetic events and other unforeseen complications, including death, the left breast was interrogated with ultrasound.  I noted a deep abscess.  Left chest wall also had 4 additional abscesses.  The left chest wall was prepped draped in aseptic fashion.  Local anesthetic was instilled in the left breast periareolar position.  A curvilinear incision was made and I care my dissection down the deep into the breast tissue identified a small retroareolar abscess  and a second larger abscess in the deep subcutaneous tissue of the breast.  This was drained I removed the entire abscess cavity along with surrounding breast tissue this was sent to pathology.  The cavity is irrigated copiously.  There was complete hemostasis.  I turned my attention to the left chest wall abscesses.  Local anesthetic was instilled.  A cruciate incision was made purulent material was extruded from all 4 abscesses.  Hemostasis achieved with cautery.  These were irrigated.  All the wounds were packed with Exufiber and covered with a Mepilex border dressing.  All needle sponge and instrument counts were correct x 2.  Patient tolerated the procedure well and discharged to the recovery room in a stable condition.  Evidence of Infection: Yes; Abscess Within the subcutaneous tissues  Complications:  None; patient tolerated the procedure well.    Disposition: PACU - hemodynamically stable.  Condition: stable     The surgical assistant assisted with positioning, preparation of the surgical site, tissue retraction, suctioning, due to the nature of the case and the difficulty of the case.  The surgical assistant performed a primary closure and dressing application.       Additional Details: Time equals 35 minutes, wound classification 4.  Body mass index 38    Attending Attestation: I performed the procedure.    Israel Vera  Phone Number: [phone]

## 2025-07-08 NOTE — DISCHARGE INSTRUCTIONS
Keep packing in the incisions until follow up appointment with Dr. Vera. Keep Mepilex dressing on incisions until appointment. Given extra dressings if they fall off. Take tylenol or ibuprofen as needed for pain. Contact Dr. Vera's office if further pain medication is needed.

## 2025-07-08 NOTE — INTERVAL H&P NOTE
H&P reviewed. The patient was examined and there are no changes to the H&P.  Has left chest wall superficial abscess , will add to consent to incise and drain

## 2025-07-08 NOTE — ANESTHESIA PROCEDURE NOTES
Airway  Date/Time: 7/8/2025 1:56 PM  Reason: elective    Airway not difficult    Staffing  Performed: CRNA   Authorized by: VERONIQUE Cotton    Performed by: VERONIQUE Cotton  Patient location during procedure: OR    Patient Condition  Indications for airway management: anesthesia and airway protection  Patient position: sniffing  MILS maintained throughout  Planned trial extubation  Sedation level: deep     Final Airway Details   Preoxygenated: yes  Final airway type: supraglottic airway  Successful airway: Supraglottic airway: i-gel.   Ventilation between attempts: none  Number of attempts at approach: 1  Number of other approaches attempted: 0

## 2025-07-08 NOTE — ANESTHESIA POSTPROCEDURE EVALUATION
Patient: Domo Arcos    Procedure Summary       Date: 07/08/25 Room / Location: [facility] 02 / [facility]    Anesthesia Start: 1349 Anesthesia Stop: 1505    Procedure: INCISION AND DRAINAGE, ABSCESS, BREAST (Left) Diagnosis:       Breast abscess in male      Left breast abscess      (Breast abscess in male [N61.1])      (Left breast abscess [N61.1])    Surgeons: Israel Vera MD Responsible Provider: VERONIQUE Cotton    Anesthesia Type: general ASA Status: 2            Anesthesia Type: general    Vitals Value Taken Time   /87 07/08/25 15:02   Temp 36.4 °C (97.5 °F) 07/08/25 15:02   Pulse 74 07/08/25 15:02   Resp 16 07/08/25 15:02   SpO2 94 % 07/08/25 15:02       Anesthesia Post Evaluation    Patient location during evaluation: PACU  Patient participation: complete - patient participated  Level of consciousness: awake and alert  Pain score: 0  Pain management: adequate  Airway patency: patent  Cardiovascular status: acceptable  Respiratory status: acceptable and room air  Hydration status: acceptable  Postoperative Nausea and Vomiting: none        There were no known notable events for this encounter.

## 2025-07-09 PROCEDURE — RXMED WILLOW AMBULATORY MEDICATION CHARGE

## 2025-07-09 RX ORDER — SYRINGE W-NEEDLE,DISPOSAB,3 ML 23GX1"
SYRINGE, EMPTY DISPOSABLE MISCELLANEOUS
Qty: 1 EACH | Refills: 0 | Status: SHIPPED | OUTPATIENT
Start: 2025-07-09

## 2025-07-09 RX ORDER — LISINOPRIL 10 MG/1
10 TABLET ORAL 2 TIMES DAILY
Qty: 360 TABLET | Refills: 0 | Status: SHIPPED | OUTPATIENT
Start: 2025-07-09

## 2025-07-09 RX ORDER — FLUOXETINE 20 MG/1
40 CAPSULE ORAL DAILY
Qty: 60 CAPSULE | Refills: 0 | Status: SHIPPED | OUTPATIENT
Start: 2025-07-09 | End: 2025-08-09

## 2025-07-09 RX ORDER — SYRINGE,SAFETY WITH NEEDLE,1ML 27GX5/8"
1 SYRINGE, EMPTY DISPOSABLE MISCELLANEOUS
Qty: 3 EACH | Refills: 0 | Status: SHIPPED | OUTPATIENT
Start: 2025-07-09

## 2025-07-09 RX ORDER — TESTOSTERONE CYPIONATE 200 MG/ML
100 INJECTION, SOLUTION INTRAMUSCULAR
Qty: 1 ML | Refills: 0 | Status: SHIPPED | OUTPATIENT
Start: 2025-07-09 | End: 2026-04-15

## 2025-07-09 RX ORDER — ATORVASTATIN CALCIUM 40 MG/1
40 TABLET, FILM COATED ORAL DAILY
Qty: 90 TABLET | Refills: 1 | Status: SHIPPED | OUTPATIENT
Start: 2025-07-09 | End: 2026-01-05

## 2025-07-09 ASSESSMENT — PAIN SCALES - GENERAL: PAINLEVEL_OUTOF10: 0 - NO PAIN

## 2025-07-10 ENCOUNTER — PHARMACY VISIT (OUTPATIENT)
Dept: PHARMACY | Facility: CLINIC | Age: 55
End: 2025-07-10
Payer: COMMERCIAL

## 2025-07-10 PROCEDURE — RXMED WILLOW AMBULATORY MEDICATION CHARGE

## 2025-07-11 LAB
BACTERIA SPEC CULT: ABNORMAL
BACTERIA SPEC CULT: ABNORMAL
GRAM STN SPEC: ABNORMAL

## 2025-07-14 ENCOUNTER — APPOINTMENT (OUTPATIENT)
Dept: SURGERY | Facility: CLINIC | Age: 55
End: 2025-07-14
Payer: COMMERCIAL

## 2025-07-14 ENCOUNTER — PHARMACY VISIT (OUTPATIENT)
Dept: PHARMACY | Facility: CLINIC | Age: 55
End: 2025-07-14
Payer: COMMERCIAL

## 2025-07-14 VITALS
DIASTOLIC BLOOD PRESSURE: 102 MMHG | BODY MASS INDEX: 37.67 KG/M2 | HEART RATE: 67 BPM | WEIGHT: 240 LBS | TEMPERATURE: 98.3 F | SYSTOLIC BLOOD PRESSURE: 165 MMHG | HEIGHT: 67 IN

## 2025-07-14 DIAGNOSIS — S21.102D OPEN WOUND OF LEFT CHEST WALL, SUBSEQUENT ENCOUNTER: Primary | ICD-10-CM

## 2025-07-14 DIAGNOSIS — S21.002D OPEN WOUND OF LEFT BREAST, SUBSEQUENT ENCOUNTER: ICD-10-CM

## 2025-07-14 PROBLEM — S21.102A OPEN WOUND OF LEFT CHEST WALL: Status: ACTIVE | Noted: 2025-07-14

## 2025-07-14 PROBLEM — S21.002A OPEN WOUND OF LEFT BREAST: Status: ACTIVE | Noted: 2025-07-14

## 2025-07-14 PROCEDURE — RXMED WILLOW AMBULATORY MEDICATION CHARGE

## 2025-07-14 PROCEDURE — 3008F BODY MASS INDEX DOCD: CPT | Performed by: SURGERY

## 2025-07-14 PROCEDURE — 3077F SYST BP >= 140 MM HG: CPT | Performed by: SURGERY

## 2025-07-14 PROCEDURE — 3080F DIAST BP >= 90 MM HG: CPT | Performed by: SURGERY

## 2025-07-14 PROCEDURE — 99024 POSTOP FOLLOW-UP VISIT: CPT | Performed by: SURGERY

## 2025-07-14 RX ORDER — SULFAMETHOXAZOLE AND TRIMETHOPRIM 800; 160 MG/1; MG/1
1 TABLET ORAL 2 TIMES DAILY
Qty: 28 TABLET | Refills: 0 | Status: SHIPPED | OUTPATIENT
Start: 2025-07-14 | End: 2025-07-28

## 2025-07-14 NOTE — PROGRESS NOTES
Patient ID: Domo Arcos is a 54 y.o. male.  Following up after incision and drainage of a deep left breast abscess and for chest wall abscesses.  Doing well.  Wounds look good      Objective   Physical Exam  Chest:      Comments: Left breast wound with abscess cavity measuring 3 x 1 x 2 cm  Skin:     Comments: Open wounds left lateral chest wall.  Measuring 2 x 1 cm, 2 and half by 1 cm, 2 x 2 cm, 1 x 1 cm     Contains abnormal data Tissue/Wound Culture/Smear  Order: [id number]   Collected 7/8/2025 14:30       Status: Final result       Dx: Left breast abscess; Breast abscess i...    Test Result Released: Yes (not seen)    Specimen Information: ABSCESS; Swab   0 Result Notes  Tissue/Wound Culture/Smear (1+) Rare Staphylococcus lugdunensis Abnormal             Gram Stain (1+) Rare Polymorphonuclear leukocytes   No organisms seen           Resulting Agency: [facility]     Susceptibility     Staphylococcus lugdunensis     MICROSCAN    Clindamycin Susceptible    Erythromycin Susceptible    Oxacillin Susceptible    Tetracycline Resistant    Trimethoprim/Sulfamethoxazole Susceptible    Vancomycin Susceptible           Problem List Items Addressed This Visit       Open wound of left breast    Open wound of left chest wall - Primary        Assessment/Plan   Doing well after incision and drainage of left breast abscess and for lateral left chest wall abscesses.  Plan-take Bactrim as ordered.  Dressing changes as discussed.  Place Exufiber dressing into the wound and cover with dressing.  Will order dressings for your home.  I will see you back in 3 weeks.    Mr. Arcos has open wounds of the left lateral chest wall ( S21.102 A) He has 4 wounds.  These measured 2 x 1 cm, 2-1/2 x 1 cm, 2 x 2 cm, and 1 x 1 cm.  He also has a left breast open wound which measures 3 x 2 x 1 cm ( s21.002A)  The 5 wounds are full-thickness with moderate drainage.  Today the wounds were all mechanically debrided using a saline moistened gauze.  The  patient will cleanse the wound with normal saline and gauze and apply a Exufiber dressing into the wound and cover with an Excel SAP border 6 x 6 cm daily.

## 2025-07-14 NOTE — PATIENT INSTRUCTIONS
Doing well after incision and drainage of left breast abscess and for lateral left chest wall abscesses.  Plan-take Bactrim as ordered.  Dressing changes as discussed.  Place Exufiber dressing into the wound and cover with dressing.  Will order dressings for your home.  I will see you back in 3 weeks.

## 2025-07-16 ENCOUNTER — RESULTS FOLLOW-UP (OUTPATIENT)
Dept: SURGERY | Facility: HOSPITAL | Age: 55
End: 2025-07-16
Payer: COMMERCIAL

## 2025-07-16 LAB
LABORATORY COMMENT REPORT: NORMAL
PATH REPORT.COMMENTS IMP SPEC: NORMAL
PATH REPORT.FINAL DX SPEC: NORMAL
PATH REPORT.GROSS SPEC: NORMAL
PATH REPORT.RELEVANT HX SPEC: NORMAL
PATH REPORT.TOTAL CANCER: NORMAL

## 2025-07-22 PROCEDURE — RXMED WILLOW AMBULATORY MEDICATION CHARGE

## 2025-07-23 ENCOUNTER — PHARMACY VISIT (OUTPATIENT)
Dept: PHARMACY | Facility: CLINIC | Age: 55
End: 2025-07-23
Payer: COMMERCIAL

## 2025-07-23 DIAGNOSIS — E29.1 HYPOGONADISM IN MALE: ICD-10-CM

## 2025-07-24 PROCEDURE — RXMED WILLOW AMBULATORY MEDICATION CHARGE

## 2025-07-25 ENCOUNTER — PHARMACY VISIT (OUTPATIENT)
Dept: PHARMACY | Facility: CLINIC | Age: 55
End: 2025-07-25
Payer: COMMERCIAL

## 2025-08-06 ENCOUNTER — APPOINTMENT (OUTPATIENT)
Dept: SURGERY | Facility: CLINIC | Age: 55
End: 2025-08-06
Payer: COMMERCIAL

## 2025-08-06 VITALS
WEIGHT: 247 LBS | BODY MASS INDEX: 38.77 KG/M2 | DIASTOLIC BLOOD PRESSURE: 98 MMHG | SYSTOLIC BLOOD PRESSURE: 160 MMHG | HEIGHT: 67 IN | TEMPERATURE: 98.4 F | HEART RATE: 76 BPM

## 2025-08-06 DIAGNOSIS — S21.102D OPEN WOUND OF LEFT CHEST WALL, SUBSEQUENT ENCOUNTER: Primary | ICD-10-CM

## 2025-08-06 DIAGNOSIS — S21.002D OPEN WOUND OF LEFT BREAST, SUBSEQUENT ENCOUNTER: ICD-10-CM

## 2025-08-06 PROCEDURE — 99024 POSTOP FOLLOW-UP VISIT: CPT | Performed by: SURGERY

## 2025-08-06 PROCEDURE — 3080F DIAST BP >= 90 MM HG: CPT | Performed by: SURGERY

## 2025-08-06 PROCEDURE — 3077F SYST BP >= 140 MM HG: CPT | Performed by: SURGERY

## 2025-08-06 PROCEDURE — 3008F BODY MASS INDEX DOCD: CPT | Performed by: SURGERY

## 2025-08-06 NOTE — PROGRESS NOTES
Patient ID: Domo Arcos is a 54 y.o. male.  Following for left breast wound and chest wall wounds.  Healing nicely.      Objective   Physical Exam  Chest:   Breasts:     Left: Skin change present.      Comments: Left breast with healing subareolar wound.    Skin:     Comments: Left chest wall wounds healing nicely.  Some granulation tissue.         Problem List Items Addressed This Visit       Open wound of left breast    Open wound of left chest wall - Primary        Assessment/Plan     Plan-continue wash with soap and water.  Start applying topical Aquaphor to the wounds until healed.  Follow as needed

## 2025-08-06 NOTE — PATIENT INSTRUCTIONS
Your wounds are healing very nicely.  Left breast wound is almost completely healed.  The left chest wall wounds were cleaned up today.  You can continue to wash it with soap and water and now start applying topical Aquaphor daily to all the wounds.  Cover the breast wound with a Band-Aid.  This should eventually heal.  I will see you as needed

## 2025-08-07 DIAGNOSIS — F41.9 ANXIETY: ICD-10-CM

## 2025-08-07 DIAGNOSIS — E29.1 HYPOGONADISM IN MALE: ICD-10-CM

## 2025-08-07 DIAGNOSIS — E29.1 HYPOGONADISM MALE: ICD-10-CM

## 2025-08-08 PROCEDURE — RXMED WILLOW AMBULATORY MEDICATION CHARGE

## 2025-08-08 RX ORDER — FLUOXETINE 20 MG/1
40 CAPSULE ORAL DAILY
Qty: 60 CAPSULE | Refills: 0 | Status: SHIPPED | OUTPATIENT
Start: 2025-08-08 | End: 2025-09-07

## 2025-08-08 RX ORDER — SYRINGE,SAFETY WITH NEEDLE,1ML 27GX5/8"
1 SYRINGE, EMPTY DISPOSABLE MISCELLANEOUS
Qty: 3 EACH | Refills: 0 | Status: SHIPPED | OUTPATIENT
Start: 2025-08-08

## 2025-08-08 RX ORDER — SYRINGE W-NEEDLE,DISPOSAB,3 ML 23GX1"
SYRINGE, EMPTY DISPOSABLE MISCELLANEOUS
Qty: 1 EACH | Refills: 0 | Status: SHIPPED | OUTPATIENT
Start: 2025-08-08

## 2025-08-08 RX ORDER — TESTOSTERONE CYPIONATE 200 MG/ML
100 INJECTION, SOLUTION INTRAMUSCULAR
Qty: 1 ML | Refills: 0 | Status: SHIPPED | OUTPATIENT
Start: 2025-08-08 | End: 2026-05-15

## 2025-08-11 ENCOUNTER — PHARMACY VISIT (OUTPATIENT)
Dept: PHARMACY | Facility: CLINIC | Age: 55
End: 2025-08-11

## 2025-08-11 DIAGNOSIS — I10 PRIMARY HYPERTENSION: ICD-10-CM

## 2025-08-11 PROCEDURE — RXMED WILLOW AMBULATORY MEDICATION CHARGE

## 2025-08-11 RX ORDER — METOPROLOL TARTRATE 50 MG/1
50 TABLET ORAL 2 TIMES DAILY
Qty: 180 TABLET | Refills: 1 | Status: SHIPPED | OUTPATIENT
Start: 2025-08-11

## 2025-08-12 ENCOUNTER — PHARMACY VISIT (OUTPATIENT)
Dept: PHARMACY | Facility: CLINIC | Age: 55
End: 2025-08-12
Payer: COMMERCIAL

## 2025-08-12 PROCEDURE — RXMED WILLOW AMBULATORY MEDICATION CHARGE

## 2025-08-13 ENCOUNTER — PHARMACY VISIT (OUTPATIENT)
Dept: PHARMACY | Facility: CLINIC | Age: 55
End: 2025-08-13
Payer: COMMERCIAL

## 2025-08-21 PROCEDURE — RXMED WILLOW AMBULATORY MEDICATION CHARGE

## 2025-08-22 ENCOUNTER — PHARMACY VISIT (OUTPATIENT)
Dept: PHARMACY | Facility: CLINIC | Age: 55
End: 2025-08-22
Payer: COMMERCIAL

## 2025-08-25 ENCOUNTER — APPOINTMENT (OUTPATIENT)
Dept: PRIMARY CARE | Facility: CLINIC | Age: 55
End: 2025-08-25
Payer: COMMERCIAL

## 2025-08-25 VITALS
WEIGHT: 242.2 LBS | OXYGEN SATURATION: 96 % | BODY MASS INDEX: 37.93 KG/M2 | DIASTOLIC BLOOD PRESSURE: 96 MMHG | SYSTOLIC BLOOD PRESSURE: 140 MMHG | HEART RATE: 80 BPM | TEMPERATURE: 97.2 F

## 2025-08-25 DIAGNOSIS — F41.9 ANXIETY: ICD-10-CM

## 2025-08-25 PROCEDURE — 3077F SYST BP >= 140 MM HG: CPT

## 2025-08-25 PROCEDURE — 3080F DIAST BP >= 90 MM HG: CPT

## 2025-08-25 PROCEDURE — 99213 OFFICE O/P EST LOW 20 MIN: CPT

## 2025-08-25 PROCEDURE — RXMED WILLOW AMBULATORY MEDICATION CHARGE

## 2025-08-25 RX ORDER — FLUOXETINE 20 MG/1
60 CAPSULE ORAL DAILY
Qty: 90 CAPSULE | Refills: 0 | Status: SHIPPED | OUTPATIENT
Start: 2025-08-25 | End: 2025-09-27

## 2025-08-28 ENCOUNTER — PHARMACY VISIT (OUTPATIENT)
Dept: PHARMACY | Facility: CLINIC | Age: 55
End: 2025-08-28
Payer: COMMERCIAL

## 2025-11-25 ENCOUNTER — APPOINTMENT (OUTPATIENT)
Dept: PRIMARY CARE | Facility: CLINIC | Age: 55
End: 2025-11-25
Payer: COMMERCIAL

## 2025-12-01 ENCOUNTER — APPOINTMENT (OUTPATIENT)
Dept: RHEUMATOLOGY | Facility: CLINIC | Age: 55
End: 2025-12-01
Payer: COMMERCIAL

## (undated) DEVICE — OINTMENT, TRIPLE ANITBIOTIC, NEOMUCIN, POLYMYCIN, BACITRACIN

## (undated) DEVICE — SPONGE, HEMOSTATIC, CELLULOSE, SURGICEL, 2 X 3 IN

## (undated) DEVICE — DRAIN, WOUND, FLAT, HUBLESS, 0.75 PERFORATION, 7 MM X 20 CM, SILICONE

## (undated) DEVICE — Device

## (undated) DEVICE — DRESSING, MEPILEX, BORDER FLEX, 3 X 3

## (undated) DEVICE — COLLECTION/DELIVERY SYSTEM, COPAN ESWAB, REG SIZE SWAB

## (undated) DEVICE — BANDAGE, COMPRESSION, W/CLIP, FLEX-MASTER, DOUBLE LENGTH, 6 IN X 11 YD, LF

## (undated) DEVICE — SOLUTION, PREP, PVP IODINE, FLIP TOP, 4OZ

## (undated) DEVICE — GLOVE, SURGICAL, PROTEXIS PI , 7.0, PF, LF

## (undated) DEVICE — GLOVE, SURGICAL, PROTEXIS PI BLUE W/NEUTHERA, 8.5, PF, LF

## (undated) DEVICE — GLOVE, SURGICAL, PROTEXIS PI , 8.5, PF, LF

## (undated) DEVICE — DRAPE PACK, GENERAL, CUSTOM, GENEVA

## (undated) DEVICE — ELECTRODE, ELECTROSURGICAL, BLADE, E-Z CLEAN, 4 IN

## (undated) DEVICE — BLADE, OSCILLATING/SAGITTAL, 25MM X 9MM

## (undated) DEVICE — 00000 VISIT COUNTER

## (undated) DEVICE — TRAY, DRY PREP, PREMIUM

## (undated) DEVICE — SOLUTION, IRRIGATION, STERILE WATER, 1000 ML, POUR BOTTLE

## (undated) DEVICE — PAD, GROUNDING, ELECTROSURGICAL, W/9 FT CABLE, POLYHESIVE II, ADULT, LF

## (undated) DEVICE — DRAPE PACK, LOWER EXTREMITY, CUSTOM, GENEVA

## (undated) DEVICE — SOLUTION, IRRIGATION, SODIUM CHLORIDE 0.9%, 1000 ML, POUR BOTTLE

## (undated) DEVICE — GLOVE, SURGICAL, PROTEXIS PI BLUE W/NEUTHERA, 7.0, PF, LF

## (undated) DEVICE — BANDAGE, GAUZE, CONFORMING, KERLIX, 6 PLY, 4.5 IN X 4.1 YD

## (undated) DEVICE — PREP, SCRUB, SKIN, FOAM, HIBICLENS, 4 OZ

## (undated) DEVICE — PADDING, WEBRIL, UNDERCAST, STERILE, 6 IN

## (undated) DEVICE — PAD, GROUNDING, ELECTROSURGICAL, PATIENT PLATE, W/O CORD, ADULT LARGE

## (undated) DEVICE — GOWN, ASTOUND, XL

## (undated) DEVICE — EVACUATOR, WOUND, SUCTION, CLOSED, JACKSON-PRATT, 100 CC, SILICONE

## (undated) DEVICE — SCRUB, APLICARE, 4OZ

## (undated) DEVICE — DRESSING, MEPILEX, FOAM, BORDER FLEX, 6 X 6

## (undated) DEVICE — BANDAGE, ESMARK, 6 IN X 12 FT